# Patient Record
Sex: MALE | Race: WHITE | HISPANIC OR LATINO | URBAN - METROPOLITAN AREA
[De-identification: names, ages, dates, MRNs, and addresses within clinical notes are randomized per-mention and may not be internally consistent; named-entity substitution may affect disease eponyms.]

---

## 2017-08-26 ENCOUNTER — EMERGENCY (EMERGENCY)
Facility: HOSPITAL | Age: 52
LOS: 0 days | Discharge: HOME | End: 2017-08-26
Attending: EMERGENCY MEDICINE | Admitting: EMERGENCY MEDICINE
Payer: MEDICAID

## 2017-08-26 VITALS
DIASTOLIC BLOOD PRESSURE: 80 MMHG | HEART RATE: 92 BPM | RESPIRATION RATE: 17 BRPM | SYSTOLIC BLOOD PRESSURE: 141 MMHG | TEMPERATURE: 98 F | OXYGEN SATURATION: 97 %

## 2017-08-26 VITALS — WEIGHT: 229.94 LBS | HEIGHT: 65 IN

## 2017-08-26 DIAGNOSIS — M25.562 PAIN IN LEFT KNEE: ICD-10-CM

## 2017-08-26 DIAGNOSIS — M25.561 PAIN IN RIGHT KNEE: ICD-10-CM

## 2017-08-26 DIAGNOSIS — M25.569 PAIN IN UNSPECIFIED KNEE: ICD-10-CM

## 2017-08-26 PROCEDURE — 73564 X-RAY EXAM KNEE 4 OR MORE: CPT | Mod: 26

## 2017-08-26 PROCEDURE — 99283 EMERGENCY DEPT VISIT LOW MDM: CPT

## 2017-08-26 RX ORDER — IBUPROFEN 200 MG
600 TABLET ORAL ONCE
Qty: 0 | Refills: 0 | Status: COMPLETED | OUTPATIENT
Start: 2017-08-26 | End: 2017-08-26

## 2017-08-26 RX ADMIN — Medication 600 MILLIGRAM(S): at 18:58

## 2017-08-26 NOTE — ED STATDOCS - MUSCULOSKELETAL, MLM
range of motion is not limited and there is no muscle tenderness. No tenderness B/L knee with palpitation however reports pain

## 2017-08-26 NOTE — ED STATDOCS - ATTENDING CONTRIBUTION TO CARE
I, Reilly Faulkner DO,  performed the initial face to face bedside interview with this patient regarding history of present illness, review of symptoms and relevant past medical, social and family history.  I completed an independent physical examination.  I was the initial provider who evaluated this patient. I have signed out the follow up of any pending tests (i.e. labs, radiological studies) to the ACP.  I have communicated the patient’s plan of care and disposition with the ACP.

## 2017-08-26 NOTE — ED STATDOCS - NS_ ATTENDINGSCRIBEDETAILS _ED_A_ED_FT
Reilly Faulkner DO (Attending): The history, relevant review of systems, past medical and surgical history, medical decision making, and physical examination was documented by the scribe in my presence and I attest to the accuracy of the documentation.

## 2017-08-26 NOTE — ED STATDOCS - PROGRESS NOTE DETAILS
signed Shagufta Rivera PA-C Pt seen in intake initially by Dr Faulkner.    ID [id number]. Pt here for bilateral knee pain for years, worsened over the past few weeks. Pt wanted to make sure he didn't have any broken bones. no effusion or erythema. extensor mechanism intact. no ACL/PCL/LCL/MCL laxity or pain with stress. 2+ DP pulse, gross motor/sensation intact, bilaterally. Likely arthritis, No significant findings on xray. Pt feeling well, agrees with DC and plan of care.

## 2017-08-26 NOTE — ED STATDOCS - OBJECTIVE STATEMENT
52 y/o male presents to the ED for knee pain. Pt has had  knee pain for 3 weeks but for the past 3 days knee pain has worsened. Denies fever, chills, nausea. NKDA. 50 y/o male presents to the ED for knee pain. Pt has had  knee pain for 3 weeks but for the past 3 days knee pain has worsened. Denies fever, chills, nausea. NKDA.  No recent history of trauma.  No other complaints at this time.

## 2017-08-26 NOTE — ED STATDOCS - MEDICAL DECISION MAKING DETAILS
signed Shagufta Rivera PA-C   arthritis, NSAID, outpt f/u ortho 51y M w/ bilateral knee pain, no trauma, ambulatory without assistance.  Will perform screening XR, offer analgesics.  If significant abnormality in XR, will have ortho assessment, if not plan for DC home w/ outpatient f/u.

## 2018-01-12 ENCOUNTER — APPOINTMENT (OUTPATIENT)
Dept: RADIOLOGY | Facility: CLINIC | Age: 53
End: 2018-01-12
Payer: COMMERCIAL

## 2018-01-12 ENCOUNTER — OUTPATIENT (OUTPATIENT)
Dept: OUTPATIENT SERVICES | Facility: HOSPITAL | Age: 53
LOS: 1 days | End: 2018-01-12
Payer: MEDICAID

## 2018-01-12 DIAGNOSIS — Z00.8 ENCOUNTER FOR OTHER GENERAL EXAMINATION: ICD-10-CM

## 2018-01-12 PROCEDURE — 71046 X-RAY EXAM CHEST 2 VIEWS: CPT | Mod: 26

## 2018-01-12 PROCEDURE — 71046 X-RAY EXAM CHEST 2 VIEWS: CPT

## 2018-10-26 ENCOUNTER — EMERGENCY (EMERGENCY)
Facility: HOSPITAL | Age: 53
LOS: 0 days | Discharge: HOME | End: 2018-10-26
Attending: EMERGENCY MEDICINE | Admitting: EMERGENCY MEDICINE
Payer: OTHER MISCELLANEOUS

## 2018-10-26 VITALS — WEIGHT: 229.94 LBS | HEIGHT: 66 IN

## 2018-10-26 VITALS
OXYGEN SATURATION: 100 % | HEART RATE: 81 BPM | RESPIRATION RATE: 18 BRPM | SYSTOLIC BLOOD PRESSURE: 149 MMHG | TEMPERATURE: 98 F | DIASTOLIC BLOOD PRESSURE: 96 MMHG
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
## 2018-10-26 DIAGNOSIS — S63.283A DISLOCATION OF PROXIMAL INTERPHALANGEAL JOINT OF LEFT MIDDLE FINGER, INITIAL ENCOUNTER: ICD-10-CM

## 2018-10-26 DIAGNOSIS — S69.82XA OTHER SPECIFIED INJURIES OF LEFT WRIST, HAND AND FINGER(S), INITIAL ENCOUNTER: ICD-10-CM
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
## 2018-10-26 PROCEDURE — 73140 X-RAY EXAM OF FINGER(S): CPT | Mod: 26,59,LT

## 2018-10-26 PROCEDURE — 99284 EMERGENCY DEPT VISIT MOD MDM: CPT

## 2018-10-26 PROCEDURE — 73090 X-RAY EXAM OF FOREARM: CPT | Mod: 26,LT

## 2018-10-26 PROCEDURE — 73110 X-RAY EXAM OF WRIST: CPT | Mod: 26,LT

## 2018-10-26 PROCEDURE — 73120 X-RAY EXAM OF HAND: CPT | Mod: 26,LT

## 2018-10-26 PROCEDURE — 73030 X-RAY EXAM OF SHOULDER: CPT | Mod: 26,LT

## 2018-10-26 RX ORDER — ACETAMINOPHEN 500 MG
975 TABLET ORAL ONCE
Qty: 0 | Refills: 0 | Status: COMPLETED | OUTPATIENT
Start: 2018-10-26 | End: 2018-10-26

## 2018-10-26 RX ADMIN — Medication 975 MILLIGRAM(S): at 14:52

## 2018-10-26 NOTE — ED ADULT TRIAGE NOTE - CHIEF COMPLAINT QUOTE
pt had his left hand caught on the handle of a large industrial mower , middle finger of left hand deformity.  used ID#[id number]

## 2018-10-26 NOTE — ED STATDOCS - PROGRESS NOTE DETAILS
signed Shagufta Rivera PA-C Pt seen initially in intake by Dr French.  ID [id number]  53M c/o left hand, wrist and forearm pain after it got caught in a rotating landscaping machine at work [employer]. Left hand dominant. Pt c/o pain in left hand and fingers, wrist, and forearm. +deformity of left 3rd and 4th digits. Denies PMH. Plan xray, likely hand consult. Pt agrees with plan of  care. signed Shagufta Rivera PA-C Pt seen initially in intake by Dr French.  +deformity and apparent ligamentous injuyr of left 3rd PIP. SPoke to Dr Bernard, will see pt in ED in consult. Pt now also c/o left shoulder pain and is requesting xray. signed Shagufta Rivera PA-C   ortho PA states pt is s/p reduction, post reduction films adequate, pt splinted. No significant findings on remaining xrays, shoulder shows calcific tendonitis. outpt f/u serge next week. return precautions given. Pt feeling well, pt and family agree with DC and plan of care. signed Shagufta Rivera PA-C   ortho PA states pt is s/p reduction, post reduction films adequate, pt splinted. No significant findings on remaining xrays, shoulder shows calcific tendonitis. outpt f/u serge next week. return precautions given. Pt feeling well, pt and family agree with [facility] and plan of care. Pt declines  services at [facility].

## 2018-10-26 NOTE — ED STATDOCS - PHYSICAL EXAMINATION
+TTP and deformity of left 2-4 digits.  Skin normal tone for race.  No laceration or skin penetration.  Sensation and motor function in the distal extremity intact.  2+ radial pulses. 2+ distal capillary refill.  No pallor, poikilothermia, paresthesia, paralysis, pulselessness, or cyanosis.  No pain out of proportion to exam.  Extremity compartments soft without any sign of compartment syndrome.  No crepitus, gas, or induration.   +TTP of 2-4th digits with decreased ROM to pain.

## 2018-10-26 NOTE — ED STATDOCS - MEDICAL DECISION MAKING DETAILS
54 y/o male presents to the ED c/o left 2rd, 3th and 4th finger injury and pain with visual deformity. Plan for XR and pain control. Likely hand consult. 54 y/o male presents to the ED c/o left 2rd, 3th and 4th finger injury and pain with visual deformity. Plan for XR and pain control. Hand consultation, dislocation reduction.  Symptomatic treatment and reassessment.

## 2018-10-26 NOTE — ED STATDOCS - NS_ ATTENDINGSCRIBEDETAILS _ED_A_ED_FT
The scribe's documentation has been prepared under my direction and personally reviewed by me in its entirety.  I confirm that the note above accurately reflects all my work, treatment, procedures, and decision making except where otherwise noted or amended by me.  Aditya French M.D.

## 2018-10-26 NOTE — ED ADULT NURSE NOTE - NSIMPLEMENTINTERV_GEN_ALL_ED
Implemented All Universal Safety Interventions:  [place] to call system. Call bell, personal items and telephone within reach. Instruct patient to call for assistance. Room bathroom lighting operational. Non-slip footwear when patient is off stretcher. Physically safe environment: no spills, clutter or unnecessary equipment. Stretcher in lowest position, wheels locked, appropriate side rails in place.

## 2018-10-26 NOTE — ED ADULT NURSE NOTE - CHIEF COMPLAINT QUOTE
pt had his left hand caught on the handle of a large industrial mower , middle finger of left hand deformity.  used ID#[id number]

## 2018-10-26 NOTE — ED STATDOCS - OBJECTIVE STATEMENT
54 y/o male presents to the ED c/o left 2rd, 3th and 4th finger injury and pain with visual deformity. Pt was at work and the lawnmower he was working with started spinning in a [place] with his hand still attached to the machine. Pt states that his hand and wrist are also in pain. No other sx at this time. NKDA. [facility]  used, ID#:[id number]. PCP- [facility].

## 2018-10-26 NOTE — CONSULT NOTE ADULT - ASSESSMENT
A/P: 53 LHD M with L 3rd PIP dislocation s/p closed reduction A/P: 53 LHD M with L 3rd PIP dislocation s/p closed reduction.    -closed reduction performed as noted. Tolerated well.   -stable joint s/p reduction with full passive ROM noted; no mechanical block noted.   -alumifoam splint applied dorsally with coban; advised to keep on until follow up next week with Dr. Bernard.  -Keep splint C/D/I.  -Pain control prn  -encourage motion at other digits.   -DW Dr. Bernard who agrees with above.  -FU with Dr. Bernard within the next 5-7 days as an outpatient.

## 2018-10-26 NOTE — ED STATDOCS - ATTENDING CONTRIBUTION TO CARE
KRYSTIN French MD,  performed the initial face to face bedside interview with this patient regarding history of present illness, review of symptoms and relevant past medical, social and family history.  I completed an independent physical examination.  I was the initial provider who evaluated this patient. I have signed out the follow up of any pending tests (i.e. labs, radiological studies) to the ACP.  I have communicated the patient’s plan of care and disposition with the ACP.

## 2018-10-26 NOTE — ED STATDOCS - MUSCULOSKELETAL, MLM
range of motion is not limited. +TTP to left 2rd, 3th and 4th finger and MCP joint. Obvious dislocation of left 3rd finger.

## 2018-10-26 NOTE — CONSULT NOTE ADULT - SUBJECTIVE AND OBJECTIVE BOX
HPI: 53 RHD M with L 3rd digit PIP dislocation that occured earlier today after being knocked to the ground by a large machine at work. Fall onto his L hand and had immediate finger deformity. Presented to the ED with his daughter given the 3rd digit deformity. His primary language is Swedish. His daughter is present to help translate. States he having pain at the deformity. No paresthesias. No head trauma or LOC. No other major injuries. Reports some vague lateral shoulder pain but moving his shoulder well. No other complaints at this time.     PAST MEDICAL & SURGICAL HISTORY:  No pertinent past medical history  No significant past surgical history    Home Medications:  None.    Allergies    No Known Allergies    Intolerances      Vital Signs Last 24 Hrs  T(C): 36.7 (26 Oct 2018 13:28), Max: 36.7 (26 Oct 2018 13:28)  T(F): 98 (26 Oct 2018 13:28), Max: 98 (26 Oct 2018 13:28)  HR: 81 (26 Oct 2018 13:28) (81 - 81)  BP: 149/96 (26 Oct 2018 13:28) (149/96 - 149/96)  BP(mean): --  RR: 18 (26 Oct 2018 13:28) (18 - 18)  SpO2: 100% (26 Oct 2018 13:28) (100% - 100%)    PE:   LUE/hand:   3rd digit deformity. Skin intact.   TTP about the PIP with visible deformity.   No other regions of pain about the 3rd digit.   No other TTP about the hand/wrist.   Finger is well perfused. Brisk capillary refill.   Sensation intact to light touch.     LUE/shoulder:  No TTP . Full painless active ROM.    Secondary Survey: No TTP over bony prominences, SILT, palpable pulses, full/painless range of motion, compartments soft    Xray: HPI: 53 RHD M with L 3rd digit injury that occured earlier today after being knocked to the ground by a large machine at work. Fall onto his L hand and had immediate finger deformity. Presented to the ED with his daughter given the 3rd digit deformity. His primary language is [place]otho. His daughter is present to help translate. States he having pain at the deformity. No paresthesias. No head trauma or LOC. No other major injuries. Reports some vague lateral shoulder pain but moving his shoulder well. No other complaints at this time.     PAST MEDICAL & SURGICAL HISTORY:  No pertinent past medical history  No significant past surgical history    Home Medications:  None.    Allergies    No Known Allergies    Intolerances      Vital Signs Last 24 Hrs  T(C): 36.7 (26 Oct 2018 13:28), Max: 36.7 (26 Oct 2018 13:28)  T(F): 98 (26 Oct 2018 13:28), Max: 98 (26 Oct 2018 13:28)  HR: 81 (26 Oct 2018 13:28) (81 - 81)  BP: 149/96 (26 Oct 2018 13:28) (149/96 - 149/96)  BP(mean): --  RR: 18 (26 Oct 2018 13:28) (18 - 18)  SpO2: 100% (26 Oct 2018 13:28) (100% - 100%)    PE:   LUE/hand:   3rd digit deformity. Skin intact.   TTP about the PIP with visible deformity.   No other regions of pain about the 3rd digit.   No other TTP about the hand/wrist.   Finger is well perfused. Brisk capillary refill.   Sensation intact to light touch.     LUE/shoulder:  No TTP . Full painless active ROM.    Secondary Survey: No TTP over bony prominences, SILT, palpable pulses, full/painless range of motion, compartments soft    Xray: L 3rd digit PIP dislocation without obvious fracture. No other bony abnormalities.  Xrays of L shoulder/wrist: unremarkable.

## 2019-10-28 ENCOUNTER — APPOINTMENT (OUTPATIENT)
Dept: ORTHOPEDIC SURGERY | Facility: CLINIC | Age: 54
End: 2019-10-28

## 2019-11-18 ENCOUNTER — APPOINTMENT (OUTPATIENT)
Dept: ORTHOPEDIC SURGERY | Facility: CLINIC | Age: 54
End: 2019-11-18

## 2020-03-30 ENCOUNTER — INPATIENT (INPATIENT)
Facility: HOSPITAL | Age: 55
LOS: 8 days | Discharge: HOME | DRG: 137 | End: 2020-04-08
Attending: FAMILY MEDICINE | Admitting: FAMILY MEDICINE
Payer: COMMERCIAL

## 2020-03-30 VITALS
SYSTOLIC BLOOD PRESSURE: 125 MMHG | RESPIRATION RATE: 18 BRPM | DIASTOLIC BLOOD PRESSURE: 84 MMHG | HEART RATE: 108 BPM | TEMPERATURE: 99 F | OXYGEN SATURATION: 91 %

## 2020-03-30 DIAGNOSIS — J18.9 PNEUMONIA, UNSPECIFIED ORGANISM: ICD-10-CM

## 2020-03-30 LAB
ALBUMIN SERPL ELPH-MCNC: 3.2 G/DL — LOW (ref 3.3–5)
ALP SERPL-CCNC: 98 U/L — SIGNIFICANT CHANGE UP (ref 40–120)
ALT FLD-CCNC: 149 U/L — HIGH (ref 12–78)
ANION GAP SERPL CALC-SCNC: 8 MMOL/L — SIGNIFICANT CHANGE UP (ref 5–17)
APPEARANCE UR: CLEAR — SIGNIFICANT CHANGE UP
APTT BLD: 39 SEC — HIGH (ref 27.5–36.3)
AST SERPL-CCNC: 56 U/L — HIGH (ref 15–37)
BASOPHILS # BLD AUTO: 0.02 K/UL — SIGNIFICANT CHANGE UP (ref 0–0.2)
BASOPHILS NFR BLD AUTO: 0.1 % — SIGNIFICANT CHANGE UP (ref 0–2)
BILIRUB SERPL-MCNC: 0.6 MG/DL — SIGNIFICANT CHANGE UP (ref 0.2–1.2)
BILIRUB UR-MCNC: NEGATIVE — SIGNIFICANT CHANGE UP
BUN SERPL-MCNC: 16 MG/DL — SIGNIFICANT CHANGE UP (ref 7–23)
CALCIUM SERPL-MCNC: 9.3 MG/DL — SIGNIFICANT CHANGE UP (ref 8.5–10.1)
CHLORIDE SERPL-SCNC: 104 MMOL/L — SIGNIFICANT CHANGE UP (ref 96–108)
CK SERPL-CCNC: 90 U/L — SIGNIFICANT CHANGE UP (ref 26–308)
CO2 SERPL-SCNC: 24 MMOL/L — SIGNIFICANT CHANGE UP (ref 22–31)
COLOR SPEC: YELLOW — SIGNIFICANT CHANGE UP
CREAT SERPL-MCNC: 0.9 MG/DL — SIGNIFICANT CHANGE UP (ref 0.5–1.3)
D DIMER BLD IA.RAPID-MCNC: 169 NG/ML DDU — SIGNIFICANT CHANGE UP
DIFF PNL FLD: NEGATIVE — SIGNIFICANT CHANGE UP
EOSINOPHIL # BLD AUTO: 0 K/UL — SIGNIFICANT CHANGE UP (ref 0–0.5)
EOSINOPHIL NFR BLD AUTO: 0 % — SIGNIFICANT CHANGE UP (ref 0–6)
GLUCOSE SERPL-MCNC: 91 MG/DL — SIGNIFICANT CHANGE UP (ref 70–99)
GLUCOSE UR QL: NEGATIVE MG/DL — SIGNIFICANT CHANGE UP
HCT VFR BLD CALC: 47.8 % — SIGNIFICANT CHANGE UP (ref 39–50)
HGB BLD-MCNC: 15.9 G/DL — SIGNIFICANT CHANGE UP (ref 13–17)
IMM GRANULOCYTES NFR BLD AUTO: 1.4 % — SIGNIFICANT CHANGE UP (ref 0–1.5)
INR BLD: 1.12 RATIO — SIGNIFICANT CHANGE UP (ref 0.88–1.16)
KETONES UR-MCNC: NEGATIVE — SIGNIFICANT CHANGE UP
LACTATE SERPL-SCNC: 1.2 MMOL/L — SIGNIFICANT CHANGE UP (ref 0.7–2)
LDH SERPL L TO P-CCNC: 366 U/L — HIGH (ref 84–241)
LEUKOCYTE ESTERASE UR-ACNC: NEGATIVE — SIGNIFICANT CHANGE UP
LYMPHOCYTES # BLD AUTO: 0.57 K/UL — LOW (ref 1–3.3)
LYMPHOCYTES # BLD AUTO: 2.6 % — LOW (ref 13–44)
MAGNESIUM SERPL-MCNC: 2.1 MG/DL — SIGNIFICANT CHANGE UP (ref 1.6–2.6)
MCHC RBC-ENTMCNC: 28.9 PG — SIGNIFICANT CHANGE UP (ref 27–34)
MCHC RBC-ENTMCNC: 33.3 GM/DL — SIGNIFICANT CHANGE UP (ref 32–36)
MCV RBC AUTO: 86.9 FL — SIGNIFICANT CHANGE UP (ref 80–100)
MONOCYTES # BLD AUTO: 0.9 K/UL — SIGNIFICANT CHANGE UP (ref 0–0.9)
MONOCYTES NFR BLD AUTO: 4.2 % — SIGNIFICANT CHANGE UP (ref 2–14)
NEUTROPHILS # BLD AUTO: 19.76 K/UL — HIGH (ref 1.8–7.4)
NEUTROPHILS NFR BLD AUTO: 91.7 % — HIGH (ref 43–77)
NITRITE UR-MCNC: NEGATIVE — SIGNIFICANT CHANGE UP
PH UR: 7 — SIGNIFICANT CHANGE UP (ref 5–8)
PLATELET # BLD AUTO: 298 K/UL — SIGNIFICANT CHANGE UP (ref 150–400)
POTASSIUM SERPL-MCNC: 3.9 MMOL/L — SIGNIFICANT CHANGE UP (ref 3.5–5.3)
POTASSIUM SERPL-SCNC: 3.9 MMOL/L — SIGNIFICANT CHANGE UP (ref 3.5–5.3)
PROT SERPL-MCNC: 7.6 GM/DL — SIGNIFICANT CHANGE UP (ref 6–8.3)
PROT UR-MCNC: NEGATIVE MG/DL — SIGNIFICANT CHANGE UP
PROTHROM AB SERPL-ACNC: 12.5 SEC — SIGNIFICANT CHANGE UP (ref 10–12.9)
RAPID RVP RESULT: SIGNIFICANT CHANGE UP
RBC # BLD: 5.5 M/UL — SIGNIFICANT CHANGE UP (ref 4.2–5.8)
RBC # FLD: 15.2 % — HIGH (ref 10.3–14.5)
SODIUM SERPL-SCNC: 136 MMOL/L — SIGNIFICANT CHANGE UP (ref 135–145)
SP GR SPEC: 1 — LOW (ref 1.01–1.02)
UROBILINOGEN FLD QL: NEGATIVE MG/DL — SIGNIFICANT CHANGE UP
WBC # BLD: 21.55 K/UL — HIGH (ref 3.8–10.5)
WBC # FLD AUTO: 21.55 K/UL — HIGH (ref 3.8–10.5)

## 2020-03-30 PROCEDURE — 93010 ELECTROCARDIOGRAM REPORT: CPT

## 2020-03-30 PROCEDURE — 81003 URINALYSIS AUTO W/O SCOPE: CPT

## 2020-03-30 PROCEDURE — 85027 COMPLETE CBC AUTOMATED: CPT

## 2020-03-30 PROCEDURE — 80076 HEPATIC FUNCTION PANEL: CPT

## 2020-03-30 PROCEDURE — 84145 PROCALCITONIN (PCT): CPT

## 2020-03-30 PROCEDURE — 83036 HEMOGLOBIN GLYCOSYLATED A1C: CPT

## 2020-03-30 PROCEDURE — 87086 URINE CULTURE/COLONY COUNT: CPT

## 2020-03-30 PROCEDURE — 83735 ASSAY OF MAGNESIUM: CPT

## 2020-03-30 PROCEDURE — 93005 ELECTROCARDIOGRAM TRACING: CPT

## 2020-03-30 PROCEDURE — 83615 LACTATE (LD) (LDH) ENZYME: CPT

## 2020-03-30 PROCEDURE — 86803 HEPATITIS C AB TEST: CPT

## 2020-03-30 PROCEDURE — 85025 COMPLETE CBC W/AUTO DIFF WBC: CPT

## 2020-03-30 PROCEDURE — 71045 X-RAY EXAM CHEST 1 VIEW: CPT

## 2020-03-30 PROCEDURE — 84484 ASSAY OF TROPONIN QUANT: CPT

## 2020-03-30 PROCEDURE — 94640 AIRWAY INHALATION TREATMENT: CPT

## 2020-03-30 PROCEDURE — 99223 1ST HOSP IP/OBS HIGH 75: CPT

## 2020-03-30 PROCEDURE — 82728 ASSAY OF FERRITIN: CPT

## 2020-03-30 PROCEDURE — 36415 COLL VENOUS BLD VENIPUNCTURE: CPT

## 2020-03-30 PROCEDURE — 82550 ASSAY OF CK (CPK): CPT

## 2020-03-30 PROCEDURE — 80074 ACUTE HEPATITIS PANEL: CPT

## 2020-03-30 PROCEDURE — 71045 X-RAY EXAM CHEST 1 VIEW: CPT | Mod: 26

## 2020-03-30 PROCEDURE — 80053 COMPREHEN METABOLIC PANEL: CPT

## 2020-03-30 PROCEDURE — 86140 C-REACTIVE PROTEIN: CPT

## 2020-03-30 PROCEDURE — 85379 FIBRIN DEGRADATION QUANT: CPT

## 2020-03-30 RX ORDER — HYDROXYCHLOROQUINE SULFATE 200 MG
200 TABLET ORAL
Refills: 0 | Status: COMPLETED | OUTPATIENT
Start: 2020-03-31 | End: 2020-04-04

## 2020-03-30 RX ORDER — HYDROXYCHLOROQUINE SULFATE 200 MG
TABLET ORAL
Refills: 0 | Status: DISCONTINUED | OUTPATIENT
Start: 2020-03-30 | End: 2020-03-30

## 2020-03-30 RX ORDER — SODIUM CHLORIDE 9 MG/ML
1000 INJECTION INTRAMUSCULAR; INTRAVENOUS; SUBCUTANEOUS ONCE
Refills: 0 | Status: COMPLETED | OUTPATIENT
Start: 2020-03-30 | End: 2020-03-30

## 2020-03-30 RX ORDER — CEFTRIAXONE 500 MG/1
1000 INJECTION, POWDER, FOR SOLUTION INTRAMUSCULAR; INTRAVENOUS EVERY 24 HOURS
Refills: 0 | Status: COMPLETED | OUTPATIENT
Start: 2020-03-30 | End: 2020-04-04

## 2020-03-30 RX ORDER — FAMOTIDINE 10 MG/ML
40 INJECTION INTRAVENOUS ONCE
Refills: 0 | Status: COMPLETED | OUTPATIENT
Start: 2020-03-30 | End: 2020-03-30

## 2020-03-30 RX ORDER — ENOXAPARIN SODIUM 100 MG/ML
40 INJECTION SUBCUTANEOUS DAILY
Refills: 0 | Status: DISCONTINUED | OUTPATIENT
Start: 2020-03-30 | End: 2020-04-08

## 2020-03-30 RX ORDER — AZITHROMYCIN 500 MG/1
500 TABLET, FILM COATED ORAL ONCE
Refills: 0 | Status: DISCONTINUED | OUTPATIENT
Start: 2020-03-30 | End: 2020-03-30

## 2020-03-30 RX ORDER — AZITHROMYCIN 500 MG/1
250 TABLET, FILM COATED ORAL DAILY
Refills: 0 | Status: DISCONTINUED | OUTPATIENT
Start: 2020-03-30 | End: 2020-04-01

## 2020-03-30 RX ORDER — ALBUTEROL 90 UG/1
2 AEROSOL, METERED ORAL EVERY 4 HOURS
Refills: 0 | Status: DISCONTINUED | OUTPATIENT
Start: 2020-03-30 | End: 2020-03-30

## 2020-03-30 RX ORDER — AZITHROMYCIN 500 MG/1
500 TABLET, FILM COATED ORAL ONCE
Refills: 0 | Status: COMPLETED | OUTPATIENT
Start: 2020-03-30 | End: 2020-03-30

## 2020-03-30 RX ORDER — AMLODIPINE BESYLATE 2.5 MG/1
10 TABLET ORAL DAILY
Refills: 0 | Status: DISCONTINUED | OUTPATIENT
Start: 2020-03-30 | End: 2020-04-08

## 2020-03-30 RX ORDER — SIMVASTATIN 20 MG/1
10 TABLET, FILM COATED ORAL AT BEDTIME
Refills: 0 | Status: DISCONTINUED | OUTPATIENT
Start: 2020-03-30 | End: 2020-04-08

## 2020-03-30 RX ORDER — ONDANSETRON 8 MG/1
4 TABLET, FILM COATED ORAL EVERY 6 HOURS
Refills: 0 | Status: DISCONTINUED | OUTPATIENT
Start: 2020-03-30 | End: 2020-04-08

## 2020-03-30 RX ORDER — ACETAMINOPHEN 500 MG
1000 TABLET ORAL EVERY 6 HOURS
Refills: 0 | Status: DISCONTINUED | OUTPATIENT
Start: 2020-03-30 | End: 2020-04-08

## 2020-03-30 RX ORDER — ALBUTEROL 90 UG/1
2 AEROSOL, METERED ORAL EVERY 4 HOURS
Refills: 0 | Status: DISCONTINUED | OUTPATIENT
Start: 2020-03-30 | End: 2020-04-08

## 2020-03-30 RX ORDER — CEFTRIAXONE 500 MG/1
1000 INJECTION, POWDER, FOR SOLUTION INTRAMUSCULAR; INTRAVENOUS EVERY 24 HOURS
Refills: 0 | Status: DISCONTINUED | OUTPATIENT
Start: 2020-03-30 | End: 2020-03-30

## 2020-03-30 RX ORDER — HYDROXYCHLOROQUINE SULFATE 200 MG
400 TABLET ORAL ONCE
Refills: 0 | Status: COMPLETED | OUTPATIENT
Start: 2020-03-30 | End: 2020-03-30

## 2020-03-30 RX ORDER — ALBUTEROL 90 UG/1
4 AEROSOL, METERED ORAL ONCE
Refills: 0 | Status: COMPLETED | OUTPATIENT
Start: 2020-03-30 | End: 2020-03-30

## 2020-03-30 RX ORDER — CEFTRIAXONE 500 MG/1
1000 INJECTION, POWDER, FOR SOLUTION INTRAMUSCULAR; INTRAVENOUS ONCE
Refills: 0 | Status: COMPLETED | OUTPATIENT
Start: 2020-03-30 | End: 2020-03-30

## 2020-03-30 RX ORDER — HYDROXYCHLOROQUINE SULFATE 200 MG
400 TABLET ORAL
Refills: 0 | Status: DISCONTINUED | OUTPATIENT
Start: 2020-03-30 | End: 2020-03-30

## 2020-03-30 RX ORDER — HYDROXYCHLOROQUINE SULFATE 200 MG
400 TABLET ORAL
Refills: 0 | Status: COMPLETED | OUTPATIENT
Start: 2020-03-31 | End: 2020-03-31

## 2020-03-30 RX ADMIN — AZITHROMYCIN 500 MILLIGRAM(S): 500 TABLET, FILM COATED ORAL at 15:36

## 2020-03-30 RX ADMIN — SODIUM CHLORIDE 1000 MILLILITER(S): 9 INJECTION INTRAMUSCULAR; INTRAVENOUS; SUBCUTANEOUS at 15:35

## 2020-03-30 RX ADMIN — Medication 30 MILLILITER(S): at 14:00

## 2020-03-30 RX ADMIN — CEFTRIAXONE 1000 MILLIGRAM(S): 500 INJECTION, POWDER, FOR SOLUTION INTRAMUSCULAR; INTRAVENOUS at 15:36

## 2020-03-30 RX ADMIN — ALBUTEROL 4 PUFF(S): 90 AEROSOL, METERED ORAL at 14:00

## 2020-03-30 RX ADMIN — FAMOTIDINE 40 MILLIGRAM(S): 10 INJECTION INTRAVENOUS at 14:00

## 2020-03-30 NOTE — H&P ADULT - NSICDXPASTMEDICALHX_GEN_ALL_CORE_FT
PAST MEDICAL HISTORY:  Asthma     High cholesterol     HTN (hypertension)     No pertinent past medical history

## 2020-03-30 NOTE — ED PROVIDER NOTE - CONSTITUTIONAL, MLM
normal... Well appearing, awake, alert, oriented to person, place, time/situation and in moderate distress due to SOB

## 2020-03-30 NOTE — ED PROVIDER NOTE - PHYSICAL EXAMINATION
Constitutional: NAD AAOx3  Eyes: PERRLA EOMI  Head: NCAT  Mouth: MMM  Cardiac: s1s2. rrr  Lungs: CTA B/L. No accessory muscle use  Abd: soft, nd. Mild epigastric ttp. no r/g/r  Neuro: CN2-12 intact  Skin: No rashes Constitutional: NAD AAOx3. Speaking full sentences   Eyes: PERRLA EOMI  Head: NCAT  Mouth: MMM  Cardiac: s1s2. rrr  Lungs: CTA B/L. No accessory muscle use  Abd: soft, nd. Mild epigastric ttp. no r/g/r  Neuro: CN2-12 intact  Skin: No rashes

## 2020-03-30 NOTE — ED PROVIDER NOTE - OBJECTIVE STATEMENT
[facility]  [id number]. 53 y/o M presents with SOB since saturday. Pt reports dry cough for the past 5 weeks. On Saturday he started feeling increased SOB and developed epigastric pain mild in intensity. Pt has a hx of ashtma however is nebulizer broke at home. Subjective fever at home. Denies chills, n/v/d, CP, HA, dizziness or other complaints at this time. -Dale Dorado PA-C

## 2020-03-30 NOTE — H&P ADULT - HISTORY OF PRESENT ILLNESS
54y old Male with PMHx of HTN, HLD, and Asthma who presents with a chief complaint of shortness of breath, pleuritic chest pain, and subjective fevers for the last 3 days.  Pt notes he started having increased wheezing and SOB on Saturday.  He tried using his inhaler, but it was broken.  He felt hot but did not check his temperature.  He describes electric shocks in his chest when taking deep breaths.  He denies nausea/ vomiting/ diarrhea/ abd pain.  Someone he works with was sick but he is not sure what he had.  No other sick contacts.  In the ER, patient found to have hypoxia-- 92% on 4L now 95% on NRB mask.  WBC 21 with 2% lymph, elevated AST/ ALT.  QTc 414.  CXR with bilateral infiltrates.  Pt admitted as r/o COVID.      PAST MEDICAL & SURGICAL HISTORY:  Asthma  High cholesterol  HTN (hypertension)    FAMILY HISTORY:  Family hx of HTN.      Social History:    No tob/ ETOH / drug use.    Lives at home with wife.      Allergies:  No Known Allergies

## 2020-03-30 NOTE — H&P ADULT - NSHPREVIEWOFSYSTEMS_GEN_ALL_CORE
ROS:  General:  fever  Skin: No rash or bothersome skin lesions  Musculoskeletal: No arthalgias, myalgias or joint swelling  Eyes: No visual changes or eye pain  Ears: No hearing loss , otorrhea or ear pain  Nose, Mouth, Throat: No nasal congestion, rhinorrhea, oral lesions, postnasal drip or sore throat  Cardio: pleuritic CP  Respiratory: cough and SOB  GI: No diarrhea, constipation, blood in stools, abdominal pain, vomiting or heartburn  : No urinary frequency, hematuria, incontinence, or dysuria  Neurologic: No headaches, parasthesias, confusion, dysarthria or gait instability  Psychiatric:  No anxiety or depression  Lymphatic:  No easy bruising, easy bleeding or swollen glands  Allergic: No itching, sneezing , watery eyes, clear rhinorrhea or recurrent infections

## 2020-03-30 NOTE — H&P ADULT - NSHPPHYSICALEXAM_GEN_ALL_CORE
PEx  T(C): 37.1 (03-30-20 @ 12:37), Max: 37.1 (03-30-20 @ 12:37)  HR: 95 (03-30-20 @ 18:45) (95 - 110)  BP: 157/80 (03-30-20 @ 18:45) (125/84 - 157/80)  RR: 28 (03-30-20 @ 18:45) (18 - 45)  SpO2: 98% (03-30-20 @ 18:45) (91% - 99%)    General:  awake and alert.  Tachypnea with rate ~30.    Skin: no rash or prominent lesions  Head: normocephalic, atraumatic     Sinuses: non-tender  Nose: no external lesions, mucosa non-inflamed, septum and turbinates normal  Throat: no erythema, exudates or lesions.  Neck: Supple without lymphadenopathy. Thyroid no thyromegaly, no palpable thyroid nodules, no palpable nodules or masses, carotid arteries no bruits.   Breasts: No palpable masses or lesions.  Heart: RRR, no murmur or gallop.  Normal S1, S2.  No S3, S4.   Lungs: wheezing and rales  Chest wall: increased WOB  Abdomen:  Soft, NT/ND, normal bowel sounds, no HSM, no masses.  No peritoneal signs.   Back: spine normal without deformity or tenderness.  Normal ROM   : Exam normal.  no inguinal hernias.  Extremities: No deformities, clubbing, cyanosis, or edema.  Musculoskeletal: Normal gait and station. No decreased range of motion, instability, atrophy or abnormal strength or tone in the head, neck, spine, ribs, pelvis or extremities.   Neurologic: CN 2-12 normal. Sensation to pain, touch and proprioception normal. DTRs normal in upper and lower extremities. No pathologic reflexes.  Motor normal.  Psychiatric: Oriented X3, intact recent and remote memory, judgement and insight, normal mood and affect.

## 2020-03-30 NOTE — H&P ADULT - NSHPLABSRESULTS_GEN_ALL_CORE
15.9   21.55 )-----------( 298      ( 30 Mar 2020 14:23 )             47.8         136  |  104  |  16  ----------------------------<  91  3.9   |  24  |  0.90    Ca    9.3      30 Mar 2020 14:23    TPro  7.6  /  Alb  3.2<L>  /  TBili  0.6  /  DBili  x   /  AST  56<H>  /  ALT  149<H>  /  AlkPhos  98      CAPILLARY BLOOD GLUCOSE        PT/INR - ( 30 Mar 2020 14:23 )   PT: 12.5 sec;   INR: 1.12 ratio         PTT - ( 30 Mar 2020 14:23 )  PTT:39.0 sec  Urinalysis Basic - ( 30 Mar 2020 17:05 )    Color: Yellow / Appearance: Clear / S.005 / pH: x  Gluc: x / Ketone: Negative  / Bili: Negative / Urobili: Negative mg/dL   Blood: x / Protein: Negative mg/dL / Nitrite: Negative   Leuk Esterase: Negative / RBC: x / WBC x   Sq Epi: x / Non Sq Epi: x / Bacteria: x    < from: Xray Chest 1 View-PORTABLE IMMEDIATE (20 @ 13:55) >      Comparison: 2018.    Findings:  The heart and mediastinal contours are exaggerated by the AP lordotic technique.. RIGHT base and LEFT perihilar consolidation is observed. No sizable effusions or pneumothorax..        IMPRESSION:    1.  Multifocal pneumonia    < end of copied text >

## 2020-03-30 NOTE — ED PROVIDER NOTE - ATTENDING CONTRIBUTION TO CARE
I, Ihsan Galindo MD, personally saw the patient with ACP.  I have personally performed a face to face diagnostic evaluation on this patient.  I have reviewed the ACP note and agree with the history, exam, and plan of care, except as noted.

## 2020-03-30 NOTE — H&P ADULT - ASSESSMENT
54y old Male with PMHx of HTN, HLD, and Asthma who presents with a chief complaint of shortness of breath, pleuritic chest pain, and subjective fevers for the last 3 days.  Pt notes he started having increased wheezing and SOB on Saturday.  He tried using his inhaler, but it was broken.  He felt hot but did not check his temperature.  He describes electric shocks in his chest when taking deep breaths.  He denies nausea/ vomiting/ diarrhea/ abd pain.  Someone he works with was sick but he is not sure what he had.  No other sick contacts.  In the ER, patient found to have hypoxia-- 92% on 4L now 95% on NRB mask.  WBC 21 with 2% lymph, elevated AST/ ALT.  QTc 414.  CXR with bilateral infiltrates.  Pt admitted as r/o COVID.      #Acute Hypoxic Respiratory Failure secondary to presumed COVID PNA:    Admit to medsurg.    Cont NRB mask for o2 support.    High suspicion of COVID-- f/u testing.    Start IV ABX-- Rocephin/ Azithro.    Start plaquenil COVID protocol.  QTc 414.    Code status d/w patient and family-- FULL CODE.  Would want intubation.    Cont albuterol MDI for wheezing q4 hours.    Check LDH, ferritin, DDimer, CRP, Procalcitonin.    Follow leukocytosis/ lymphopenia.      #HTN:    Hold ACE/ HCTZ.    Cont CCB.      #HLD:    Cont statin.      #Elevated LFTs:    Check hepatitis panel.  Likely related to sepsis/ r/o COVID.    Repeat in am.      #Asthma Exacerbation:    Secondary to presumed COVID.  Albuterol MDI.    Hold on steroids for now.      #DVT proph:  Lovenox.      #Code Status:  FULL CODE.

## 2020-03-30 NOTE — ED PROVIDER NOTE - CLINICAL SUMMARY MEDICAL DECISION MAKING FREE TEXT BOX
pt hypoxic on RA with hx of ashtma, probable covid, will check labs, cxr, start abx and admit. -Dale Dorado PA-C

## 2020-03-30 NOTE — ED ADULT NURSE NOTE - OBJECTIVE STATEMENT
c/o shortness of breath and cough for past 2 days, denies sick contact or travel HX: asthma, HTN, high cholesterol

## 2020-03-30 NOTE — ED PROVIDER NOTE - PROGRESS NOTE DETAILS
Anurag Pereira for attending Dr. Galindo: Pt's PCP, Dr. Martins. Pt has hx of asthma. Pt presents with fever and SOB. Pt hypoxic on RA in 80s. With NC, pt sat 91%. Pt now on nonrebreather. Pt tachypneic and with distant breath sounds. Concern for COVID PNA. Plan: labs, chest XR, likely admission. Agree with PA assessment and plan.  ID#: [id number]. Anurag Pereira for attending Dr. Galindo: Pt's PCP, Dr. Martins. Pt has hx of asthma. Pt presents with fever and SOB. Pt hypoxic on RA in 80s. With NC, pt sat 91%. pt started on  nonrebreather. Pt tachypneic and with distant breath sounds. Concern for COVID PNA. Plan: labs, chest XR, likely admission. Agree with PA assessment and plan.  ID#: [id number].

## 2020-03-31 LAB
ADD ON TEST-SPECIMEN IN LAB: SIGNIFICANT CHANGE UP
ALBUMIN SERPL ELPH-MCNC: 2.6 G/DL — LOW (ref 3.3–5)
ALP SERPL-CCNC: 102 U/L — SIGNIFICANT CHANGE UP (ref 40–120)
ALT FLD-CCNC: 124 U/L — HIGH (ref 12–78)
AST SERPL-CCNC: 41 U/L — HIGH (ref 15–37)
BILIRUB DIRECT SERPL-MCNC: 0.2 MG/DL — SIGNIFICANT CHANGE UP (ref 0–0.2)
BILIRUB INDIRECT FLD-MCNC: 0.4 MG/DL — SIGNIFICANT CHANGE UP (ref 0.2–1)
BILIRUB SERPL-MCNC: 0.6 MG/DL — SIGNIFICANT CHANGE UP (ref 0.2–1.2)
CRP SERPL-MCNC: 10.31 MG/DL — HIGH (ref 0–0.4)
CULTURE RESULTS: NO GROWTH — SIGNIFICANT CHANGE UP
FERRITIN SERPL-MCNC: 830 NG/ML — HIGH (ref 30–400)
HAV IGM SER-ACNC: SIGNIFICANT CHANGE UP
HBV CORE IGM SER-ACNC: SIGNIFICANT CHANGE UP
HBV SURFACE AG SER-ACNC: SIGNIFICANT CHANGE UP
HCT VFR BLD CALC: 46.1 % — SIGNIFICANT CHANGE UP (ref 39–50)
HCV AB S/CO SERPL IA: 0.09 S/CO — SIGNIFICANT CHANGE UP (ref 0–0.99)
HCV AB S/CO SERPL IA: 0.1 S/CO — SIGNIFICANT CHANGE UP (ref 0–0.99)
HCV AB SERPL-IMP: SIGNIFICANT CHANGE UP
HCV AB SERPL-IMP: SIGNIFICANT CHANGE UP
HGB BLD-MCNC: 15.3 G/DL — SIGNIFICANT CHANGE UP (ref 13–17)
MCHC RBC-ENTMCNC: 29.1 PG — SIGNIFICANT CHANGE UP (ref 27–34)
MCHC RBC-ENTMCNC: 33.2 GM/DL — SIGNIFICANT CHANGE UP (ref 32–36)
MCV RBC AUTO: 87.6 FL — SIGNIFICANT CHANGE UP (ref 80–100)
PLATELET # BLD AUTO: 270 K/UL — SIGNIFICANT CHANGE UP (ref 150–400)
PROCALCITONIN SERPL-MCNC: 0.06 NG/ML — SIGNIFICANT CHANGE UP (ref 0.02–0.1)
PROT SERPL-MCNC: 7.1 GM/DL — SIGNIFICANT CHANGE UP (ref 6–8.3)
RBC # BLD: 5.26 M/UL — SIGNIFICANT CHANGE UP (ref 4.2–5.8)
RBC # FLD: 15.3 % — HIGH (ref 10.3–14.5)
SARS-COV-2 RNA SPEC QL NAA+PROBE: DETECTED
SPECIMEN SOURCE: SIGNIFICANT CHANGE UP
WBC # BLD: 17.83 K/UL — HIGH (ref 3.8–10.5)
WBC # FLD AUTO: 17.83 K/UL — HIGH (ref 3.8–10.5)

## 2020-03-31 PROCEDURE — 93010 ELECTROCARDIOGRAM REPORT: CPT

## 2020-03-31 PROCEDURE — 99233 SBSQ HOSP IP/OBS HIGH 50: CPT

## 2020-03-31 RX ORDER — HYDROXYCHLOROQUINE SULFATE 200 MG
400 TABLET ORAL ONCE
Refills: 0 | Status: COMPLETED | OUTPATIENT
Start: 2020-03-31 | End: 2020-03-31

## 2020-03-31 RX ORDER — OXYCODONE HYDROCHLORIDE 5 MG/1
5 TABLET ORAL EVERY 4 HOURS
Refills: 0 | Status: DISCONTINUED | OUTPATIENT
Start: 2020-03-31 | End: 2020-04-06

## 2020-03-31 RX ADMIN — ALBUTEROL 2 PUFF(S): 90 AEROSOL, METERED ORAL at 14:24

## 2020-03-31 RX ADMIN — Medication 1000 MILLIGRAM(S): at 15:46

## 2020-03-31 RX ADMIN — Medication 200 MILLIGRAM(S): at 20:30

## 2020-03-31 RX ADMIN — SIMVASTATIN 10 MILLIGRAM(S): 20 TABLET, FILM COATED ORAL at 20:30

## 2020-03-31 RX ADMIN — ALBUTEROL 2 PUFF(S): 90 AEROSOL, METERED ORAL at 00:03

## 2020-03-31 RX ADMIN — AMLODIPINE BESYLATE 10 MILLIGRAM(S): 2.5 TABLET ORAL at 03:51

## 2020-03-31 RX ADMIN — Medication 60 MILLIGRAM(S): at 11:23

## 2020-03-31 RX ADMIN — Medication 400 MILLIGRAM(S): at 18:40

## 2020-03-31 RX ADMIN — Medication 1000 MILLIGRAM(S): at 03:52

## 2020-03-31 RX ADMIN — AZITHROMYCIN 250 MILLIGRAM(S): 500 TABLET, FILM COATED ORAL at 11:31

## 2020-03-31 RX ADMIN — OXYCODONE HYDROCHLORIDE 5 MILLIGRAM(S): 5 TABLET ORAL at 11:20

## 2020-03-31 RX ADMIN — ENOXAPARIN SODIUM 40 MILLIGRAM(S): 100 INJECTION SUBCUTANEOUS at 11:31

## 2020-03-31 RX ADMIN — Medication 60 MILLIGRAM(S): at 20:29

## 2020-03-31 RX ADMIN — ALBUTEROL 2 PUFF(S): 90 AEROSOL, METERED ORAL at 08:13

## 2020-03-31 RX ADMIN — ALBUTEROL 2 PUFF(S): 90 AEROSOL, METERED ORAL at 03:53

## 2020-03-31 RX ADMIN — ALBUTEROL 2 PUFF(S): 90 AEROSOL, METERED ORAL at 20:50

## 2020-03-31 RX ADMIN — Medication 400 MILLIGRAM(S): at 00:02

## 2020-03-31 RX ADMIN — CEFTRIAXONE 1000 MILLIGRAM(S): 500 INJECTION, POWDER, FOR SOLUTION INTRAMUSCULAR; INTRAVENOUS at 14:24

## 2020-03-31 RX ADMIN — ALBUTEROL 2 PUFF(S): 90 AEROSOL, METERED ORAL at 16:44

## 2020-03-31 NOTE — PROGRESS NOTE ADULT - ASSESSMENT
54y old Male with PMHx of HTN, HLD, and Asthma who presents with a chief complaint of shortness of breath, pleuritic chest pain, and subjective fevers for the last 3 days.  Pt notes he started having increased wheezing and SOB on Saturday.  He tried using his inhaler, but it was broken.  He felt hot but did not check his temperature.  He describes electric shocks in his chest when taking deep breaths.  He denies nausea/ vomiting/ diarrhea/ abd pain.  Someone he works with was sick but he is not sure what he had.  No other sick contacts.  In the ER, patient found to have hypoxia-- 92% on 4L now 95% on NRB mask.  WBC 21 with 2% lymph, elevated AST/ ALT.  QTc 414.  CXR with bilateral infiltrates.  Pt admitted as r/o COVID.      #Acute Hypoxic Respiratory Failure secondary to COVID PNA:      Cont NRB mask for o2 support.    Cont IV ABX-- Rocephin/ Azithro.    Cont plaquenil COVID protocol.  QTc 414.    Start IV steroids Solumedrol 60 q12 hours x 5 days.    Code status d/w patient and family-- FULL CODE.  Would want intubation.    Cont albuterol MDI for wheezing q4 hours.    Inflammatory markers reviewed.       #HTN:    Hold ACE/ HCTZ.    Cont CCB.      #HLD:    Cont statin.      #Elevated LFTs:    Check hepatitis panel.  Likely related to sepsis/ r/o COVID.    Repeat in am.      #Asthma Exacerbation:    Secondary to presumed COVID.  Albuterol MDI.    Steroids.      #DVT proph:  Lovenox.      #Code Status:  FULL CODE.

## 2020-03-31 NOTE — PHARMACOTHERAPY INTERVENTION NOTE - COMMENTS
COVID-19. Entered stop date on 04/03/2020 at 23:59 for 5-day course total, 5-day course (500 mg x1, then 250 mg QD x4 days) preferred as part of the hospital-wide stewardship effort, per ID physicians.

## 2020-03-31 NOTE — PROGRESS NOTE ADULT - SUBJECTIVE AND OBJECTIVE BOX
54y old Male with PMHx of HTN, HLD, and Asthma who presents with a chief complaint of shortness of breath, pleuritic chest pain, and subjective fevers for the last 3 days.  Pt notes he started having increased wheezing and SOB on Saturday.  He tried using his inhaler, but it was broken.  He felt hot but did not check his temperature.  He describes electric shocks in his chest when taking deep breaths.  He denies nausea/ vomiting/ diarrhea/ abd pain.  Someone he works with was sick but he is not sure what he had.  No other sick contacts.  In the ER, patient found to have hypoxia-- 92% on 4L now 95% on NRB mask.  WBC 21 with 2% lymph, elevated AST/ ALT.  QTc 414.  CXR with bilateral infiltrates.  COVID +.      3/31:  Patient seen and examined at bedside earlier today.  Feeling slightly better today.  Less SOB.  Tearful.      Review of system- Rest of the review of system are negative except mentioned in HPI    OBJECTIVE:   T(C): 36.4 (20 @ 15:44), Max: 36.8 (20 @ 00:01)  HR: 88 (20 @ 20:26) (87 - 97)  BP: 146/86 (20 @ 15:44) (127/72 - 146/86)  RR: 20 (20 @ 15:44) (20 - 21)  SpO2: 98% (20 @ 20:26) (96% - 98%)  Wt(kg): --  Daily     Daily Weight in k.6 (31 Mar 2020 09:39)    PHYSICAL EXAM:  GENERAL: NAD  NERVOUS SYSTEM:  Alert & Oriented X3, non- focal exam, Motor Strength 5/5 B/L upper and lower extremities; DTRs 2+ intact and symmetric  HEAD:  Atraumatic, Normocephalic  EYES: EOMI, PERRLA, conjunctiva and sclera clear  HEENT: Moist mucous membranes  NECK: Supple, No JVD  CHEST/LUNG: Clear to auscultation bilaterally; No rales, no rhonchi, no wheezing, or rubs  HEART: Regular rate and rhythm; No murmurs, rubs, or gallops  ABDOMEN: Soft, Nontender, Nondistended; Bowel sounds present  GENITOURINARY- Voiding, no suprapubic tenderness  EXTREMITIES:  2+ Peripheral Pulses, No clubbing, cyanosis, or edema  MUSCULOSKELETAL:- No muscle tenderness, Muscle tone normal, No joint tenderness, no Joint swelling, Joint range of motion-normal  SKIN-no rash, no lesion    LABS:                        15.3   17.83 )-----------( 270      ( 31 Mar 2020 07:16 )             46.1     03-30    136  |  104  |  16  ----------------------------<  91  3.9   |  24  |  0.90    Ca    9.3      30 Mar 2020 14:23  Mg     2.1     03-30    TPro  7.1  /  Alb  2.6<L>  /  TBili  0.6  /  DBili  0.2  /  AST  41<H>  /  ALT  124<H>  /  AlkPhos  102  03-31    PT/INR - ( 30 Mar 2020 14:23 )   PT: 12.5 sec;   INR: 1.12 ratio         PTT - ( 30 Mar 2020 14:23 )  PTT:39.0 sec  CARDIAC MARKERS ( 31 Mar 2020 07:16 )  <0.015 ng/mL / x     / x     / x     / x      CARDIAC MARKERS ( 30 Mar 2020 22:02 )  x     / x     / 90 U/L / x     / x          Urinalysis Basic - ( 30 Mar 2020 17:05 )    Color: Yellow / Appearance: Clear / S.005 / pH: x  Gluc: x / Ketone: Negative  / Bili: Negative / Urobili: Negative mg/dL   Blood: x / Protein: Negative mg/dL / Nitrite: Negative   Leuk Esterase: Negative / RBC: x / WBC x   Sq Epi: x / Non Sq Epi: x / Bacteria: x    Current medications:  acetaminophen   Tablet .. 1000 milliGRAM(s) Oral every 6 hours PRN  ALBUTerol    90 MICROgram(s) HFA Inhaler 2 Puff(s) Inhalation every 4 hours  aluminum hydroxide/magnesium hydroxide/simethicone Suspension 30 milliLiter(s) Oral every 4 hours PRN  amLODIPine   Tablet 10 milliGRAM(s) Oral daily  azithromycin   Tablet 250 milliGRAM(s) Oral daily  cefTRIAXone Injectable. 1000 milliGRAM(s) IV Push every 24 hours  enoxaparin Injectable 40 milliGRAM(s) SubCutaneous daily  hydroxychloroquine 200 milliGRAM(s) Oral two times a day  methylPREDNISolone sodium succinate Injectable 60 milliGRAM(s) IV Push every 12 hours  ondansetron Injectable 4 milliGRAM(s) IV Push every 6 hours PRN  oxyCODONE    IR 5 milliGRAM(s) Oral every 4 hours PRN  simvastatin 10 milliGRAM(s) Oral at bedtime

## 2020-04-01 PROCEDURE — 99232 SBSQ HOSP IP/OBS MODERATE 35: CPT

## 2020-04-01 RX ADMIN — OXYCODONE HYDROCHLORIDE 5 MILLIGRAM(S): 5 TABLET ORAL at 09:11

## 2020-04-01 RX ADMIN — Medication 200 MILLIGRAM(S): at 05:59

## 2020-04-01 RX ADMIN — ENOXAPARIN SODIUM 40 MILLIGRAM(S): 100 INJECTION SUBCUTANEOUS at 09:44

## 2020-04-01 RX ADMIN — Medication 1000 MILLIGRAM(S): at 00:49

## 2020-04-01 RX ADMIN — ALBUTEROL 2 PUFF(S): 90 AEROSOL, METERED ORAL at 12:43

## 2020-04-01 RX ADMIN — SIMVASTATIN 10 MILLIGRAM(S): 20 TABLET, FILM COATED ORAL at 22:09

## 2020-04-01 RX ADMIN — ALBUTEROL 2 PUFF(S): 90 AEROSOL, METERED ORAL at 06:01

## 2020-04-01 RX ADMIN — AMLODIPINE BESYLATE 10 MILLIGRAM(S): 2.5 TABLET ORAL at 05:59

## 2020-04-01 RX ADMIN — Medication 60 MILLIGRAM(S): at 18:39

## 2020-04-01 RX ADMIN — ALBUTEROL 2 PUFF(S): 90 AEROSOL, METERED ORAL at 00:05

## 2020-04-01 RX ADMIN — Medication 200 MILLIGRAM(S): at 18:39

## 2020-04-01 RX ADMIN — CEFTRIAXONE 1000 MILLIGRAM(S): 500 INJECTION, POWDER, FOR SOLUTION INTRAMUSCULAR; INTRAVENOUS at 09:44

## 2020-04-01 RX ADMIN — ALBUTEROL 2 PUFF(S): 90 AEROSOL, METERED ORAL at 20:33

## 2020-04-01 RX ADMIN — Medication 60 MILLIGRAM(S): at 06:00

## 2020-04-01 RX ADMIN — ALBUTEROL 2 PUFF(S): 90 AEROSOL, METERED ORAL at 09:15

## 2020-04-01 RX ADMIN — ALBUTEROL 2 PUFF(S): 90 AEROSOL, METERED ORAL at 16:25

## 2020-04-01 NOTE — PROGRESS NOTE ADULT - ASSESSMENT
54y old Male with PMHx of HTN, HLD, and Asthma who presents with a chief complaint of shortness of breath, pleuritic chest pain, and subjective fevers for the last 3 days.  Pt notes he started having increased wheezing and SOB on Saturday.  He tried using his inhaler, but it was broken.  He felt hot but did not check his temperature.  He describes electric shocks in his chest when taking deep breaths.  He denies nausea/ vomiting/ diarrhea/ abd pain.  Someone he works with was sick but he is not sure what he had.  No other sick contacts.  In the ER, patient found to have hypoxia-- 92% on 4L now 95% on NRB mask.  WBC 21 with 2% lymph, elevated AST/ ALT.  QTc 414.  CXR with bilateral infiltrates.  Pt admitted as r/o COVID.      #Acute Hypoxic Respiratory Failure secondary to COVID PNA:      Oxygen titrated down from NRB to 5L.    Cont IV ABX-- Rocephin/ Azithro day #3.    Cont plaquenil COVID protocol day #3.  QTc 414.    Start IV steroids Solumedrol 60 q12 hours x 5 days.    Code status d/w patient and family-- FULL CODE.  Would want intubation.    Cont albuterol MDI for wheezing q4 hours.    Inflammatory markers elevated.      #HTN:    Hold ACE/ HCTZ.    Cont CCB.      #HLD:    Cont statin.      #Elevated LFTs:    Check hepatitis panel.  Likely related to sepsis/ r/o COVID.    Repeat in am.      #Asthma Exacerbation:    Secondary to presumed COVID.  Albuterol MDI.    Steroids.      #DVT proph:  Lovenox.      #Code Status:  FULL CODE.

## 2020-04-01 NOTE — PROGRESS NOTE ADULT - SUBJECTIVE AND OBJECTIVE BOX
54y old Male with PMHx of HTN, HLD, and Asthma who presents with a chief complaint of shortness of breath, pleuritic chest pain, and subjective fevers for the last 3 days.  Pt notes he started having increased wheezing and SOB on Saturday.  He tried using his inhaler, but it was broken.  He felt hot but did not check his temperature.  He describes electric shocks in his chest when taking deep breaths.  He denies nausea/ vomiting/ diarrhea/ abd pain.  Someone he works with was sick but he is not sure what he had.  No other sick contacts.  In the ER, patient found to have hypoxia-- 92% on 4L now 95% on NRB mask.  WBC 21 with 2% lymph, elevated AST/ ALT.  QTc 414.  CXR with bilateral infiltrates.  COVID +.      3/31:  Patient seen and examined at bedside earlier today.  Feeling slightly better today.  Less SOB.  Tearful.  [place]  phone used to communicate with patient.    4/1:  Pt seen.  Oxygen titrated down.  Now on 5L.  Feeling better.      Review of system- Rest of the review of system are negative except mentioned in HPI    Vital Signs Last 24 Hrs  T(C): 36.3 (01 Apr 2020 10:35), Max: 36.6 (01 Apr 2020 06:00)  T(F): 97.3 (01 Apr 2020 10:35), Max: 97.8 (01 Apr 2020 06:00)  HR: 87 (01 Apr 2020 10:35) (80 - 88)  BP: 136/77 (01 Apr 2020 10:35) (134/82 - 136/77)  BP(mean): --  RR: 18 (01 Apr 2020 12:00) (18 - 20)  SpO2: 92% (01 Apr 2020 12:00) (88% - 98%)    PHYSICAL EXAM:  GENERAL: NAD  NERVOUS SYSTEM:  Alert & Oriented X3, non- focal exam, Motor Strength 5/5 B/L upper and lower extremities; DTRs 2+ intact and symmetric  HEAD:  Atraumatic, Normocephalic  EYES: EOMI, PERRLA, conjunctiva and sclera clear  HEENT: Moist mucous membranes  NECK: Supple, No JVD  CHEST/LUNG: Clear to auscultation bilaterally; No rales, no rhonchi, no wheezing, or rubs  HEART: Regular rate and rhythm; No murmurs, rubs, or gallops  ABDOMEN: Soft, Nontender, Nondistended; Bowel sounds present  GENITOURINARY- Voiding, no suprapubic tenderness  EXTREMITIES:  2+ Peripheral Pulses, No clubbing, cyanosis, or edema  MUSCULOSKELETAL:- No muscle tenderness, Muscle tone normal, No joint tenderness, no Joint swelling, Joint range of motion-normal  SKIN-no rash, no lesion      Mg     2.1     03-30    TPro  7.1  /  Alb  2.6<L>  /  TBili  0.6  /  DBili  0.2  /  AST  41<H>  /  ALT  124<H>  /  AlkPhos  102  03-31                            15.3   17.83 )-----------( 270      ( 31 Mar 2020 07:16 )             46.1       CARDIAC MARKERS ( 31 Mar 2020 07:16 )  <0.015 ng/mL / x     / x     / x     / x      CARDIAC MARKERS ( 30 Mar 2020 22:02 )  x     / x     / 90 U/L / x     / x            LIVER FUNCTIONS - ( 31 Mar 2020 07:16 )  Alb: 2.6 g/dL / Pro: 7.1 gm/dL / ALK PHOS: 102 U/L / ALT: 124 U/L / AST: 41 U/L / GGT: x           MEDICATIONS  (STANDING):  ALBUTerol    90 MICROgram(s) HFA Inhaler 2 Puff(s) Inhalation every 4 hours  amLODIPine   Tablet 10 milliGRAM(s) Oral daily  cefTRIAXone Injectable. 1000 milliGRAM(s) IV Push every 24 hours  enoxaparin Injectable 40 milliGRAM(s) SubCutaneous daily  hydroxychloroquine 200 milliGRAM(s) Oral two times a day  methylPREDNISolone sodium succinate Injectable 60 milliGRAM(s) IV Push every 12 hours  simvastatin 10 milliGRAM(s) Oral at bedtime    MEDICATIONS  (PRN):  acetaminophen   Tablet .. 1000 milliGRAM(s) Oral every 6 hours PRN Mild Pain (1 - 3)  aluminum hydroxide/magnesium hydroxide/simethicone Suspension 30 milliLiter(s) Oral every 4 hours PRN Dyspepsia  ondansetron Injectable 4 milliGRAM(s) IV Push every 6 hours PRN Nausea and/or Vomiting  oxyCODONE    IR 5 milliGRAM(s) Oral every 4 hours PRN Moderate Pain (4 - 6)

## 2020-04-02 LAB
ALBUMIN SERPL ELPH-MCNC: 2.7 G/DL — LOW (ref 3.3–5)
ALP SERPL-CCNC: 98 U/L — SIGNIFICANT CHANGE UP (ref 40–120)
ALT FLD-CCNC: 126 U/L — HIGH (ref 12–78)
ANION GAP SERPL CALC-SCNC: 8 MMOL/L — SIGNIFICANT CHANGE UP (ref 5–17)
AST SERPL-CCNC: 40 U/L — HIGH (ref 15–37)
BILIRUB SERPL-MCNC: 0.6 MG/DL — SIGNIFICANT CHANGE UP (ref 0.2–1.2)
BUN SERPL-MCNC: 22 MG/DL — SIGNIFICANT CHANGE UP (ref 7–23)
CALCIUM SERPL-MCNC: 9.1 MG/DL — SIGNIFICANT CHANGE UP (ref 8.5–10.1)
CHLORIDE SERPL-SCNC: 108 MMOL/L — SIGNIFICANT CHANGE UP (ref 96–108)
CO2 SERPL-SCNC: 24 MMOL/L — SIGNIFICANT CHANGE UP (ref 22–31)
CREAT SERPL-MCNC: 0.68 MG/DL — SIGNIFICANT CHANGE UP (ref 0.5–1.3)
D DIMER BLD IA.RAPID-MCNC: 152 NG/ML DDU — SIGNIFICANT CHANGE UP
GLUCOSE SERPL-MCNC: 122 MG/DL — HIGH (ref 70–99)
POTASSIUM SERPL-MCNC: 3.9 MMOL/L — SIGNIFICANT CHANGE UP (ref 3.5–5.3)
POTASSIUM SERPL-SCNC: 3.9 MMOL/L — SIGNIFICANT CHANGE UP (ref 3.5–5.3)
PROT SERPL-MCNC: 6.8 GM/DL — SIGNIFICANT CHANGE UP (ref 6–8.3)
SODIUM SERPL-SCNC: 140 MMOL/L — SIGNIFICANT CHANGE UP (ref 135–145)

## 2020-04-02 PROCEDURE — 93010 ELECTROCARDIOGRAM REPORT: CPT

## 2020-04-02 PROCEDURE — 99233 SBSQ HOSP IP/OBS HIGH 50: CPT

## 2020-04-02 RX ORDER — PANTOPRAZOLE SODIUM 20 MG/1
40 TABLET, DELAYED RELEASE ORAL
Refills: 0 | Status: DISCONTINUED | OUTPATIENT
Start: 2020-04-02 | End: 2020-04-08

## 2020-04-02 RX ADMIN — ALBUTEROL 2 PUFF(S): 90 AEROSOL, METERED ORAL at 00:22

## 2020-04-02 RX ADMIN — SIMVASTATIN 10 MILLIGRAM(S): 20 TABLET, FILM COATED ORAL at 22:10

## 2020-04-02 RX ADMIN — CEFTRIAXONE 1000 MILLIGRAM(S): 500 INJECTION, POWDER, FOR SOLUTION INTRAMUSCULAR; INTRAVENOUS at 13:08

## 2020-04-02 RX ADMIN — Medication 60 MILLIGRAM(S): at 06:17

## 2020-04-02 RX ADMIN — AMLODIPINE BESYLATE 10 MILLIGRAM(S): 2.5 TABLET ORAL at 06:17

## 2020-04-02 RX ADMIN — ALBUTEROL 2 PUFF(S): 90 AEROSOL, METERED ORAL at 04:44

## 2020-04-02 RX ADMIN — Medication 200 MILLIGRAM(S): at 18:06

## 2020-04-02 RX ADMIN — ALBUTEROL 2 PUFF(S): 90 AEROSOL, METERED ORAL at 13:08

## 2020-04-02 RX ADMIN — Medication 200 MILLIGRAM(S): at 06:17

## 2020-04-02 RX ADMIN — ALBUTEROL 2 PUFF(S): 90 AEROSOL, METERED ORAL at 16:46

## 2020-04-02 RX ADMIN — ALBUTEROL 2 PUFF(S): 90 AEROSOL, METERED ORAL at 08:42

## 2020-04-02 RX ADMIN — ALBUTEROL 2 PUFF(S): 90 AEROSOL, METERED ORAL at 22:11

## 2020-04-02 RX ADMIN — Medication 60 MILLIGRAM(S): at 18:06

## 2020-04-02 RX ADMIN — ENOXAPARIN SODIUM 40 MILLIGRAM(S): 100 INJECTION SUBCUTANEOUS at 11:13

## 2020-04-02 NOTE — PROGRESS NOTE ADULT - SUBJECTIVE AND OBJECTIVE BOX
54y old Male with PMHx of HTN, HLD, and Asthma who presents with a chief complaint of shortness of breath, pleuritic chest pain, and subjective fevers for the last 3 days.  Pt notes he started having increased wheezing and SOB on Saturday.  He tried using his inhaler, but it was broken.  He felt hot but did not check his temperature.  He describes electric shocks in his chest when taking deep breaths.  He denies nausea/ vomiting/ diarrhea/ abd pain.  Someone he works with was sick but he is not sure what he had.  No other sick contacts.  In the ER, patient found to have hypoxia-- 92% on 4L now 95% on NRB mask.  WBC 21 with 2% lymph, elevated AST/ ALT.  QTc 414.  CXR with bilateral infiltrates.  COVID +.      3/31:  Patient seen and examined at bedside earlier today.  Feeling slightly better today.  Less SOB.  Tearful.  [place]  phone used to communicate with patient.    4/1:  Pt seen.  Oxygen titrated down.  Now on 5L.  Feeling better.  3  4/2:  Pt seen.  Feeling better overall since admission.  C/o some LUQ pain.  Some burning sensation.      Review of system- Rest of the review of system are negative except mentioned in HPI    Vital Signs Last 24 Hrs  T(C): 36.8 (02 Apr 2020 09:51), Max: 37.1 (01 Apr 2020 22:04)  T(F): 98.3 (02 Apr 2020 09:51), Max: 98.7 (01 Apr 2020 22:04)  HR: 102 (02 Apr 2020 09:51) (80 - 102)  BP: 138/70 (02 Apr 2020 09:51) (137/81 - 148/84)  BP(mean): --  RR: 20 (02 Apr 2020 09:51) (18 - 20)  SpO2: 94% (02 Apr 2020 09:51) (91% - 94%)    PHYSICAL EXAM:  GENERAL: NAD  NERVOUS SYSTEM:  Alert & Oriented X3, non- focal exam, Motor Strength 5/5 B/L upper and lower extremities; DTRs 2+ intact and symmetric  HEAD:  Atraumatic, Normocephalic  EYES: EOMI, PERRLA, conjunctiva and sclera clear  HEENT: Moist mucous membranes  NECK: Supple, No JVD  CHEST/LUNG: Clear to auscultation bilaterally; No rales, no rhonchi, no wheezing, or rubs  HEART: Regular rate and rhythm; No murmurs, rubs, or gallops  ABDOMEN: Soft, Nontender, Nondistended; Bowel sounds present  GENITOURINARY- Voiding, no suprapubic tenderness  EXTREMITIES:  2+ Peripheral Pulses, No clubbing, cyanosis, or edema  MUSCULOSKELETAL:- No muscle tenderness, Muscle tone normal, No joint tenderness, no Joint swelling, Joint range of motion-normal  SKIN-no rash, no lesion    04-02    140  |  108  |  22  ----------------------------<  122<H>  3.9   |  24  |  0.68    Ca    9.1      02 Apr 2020 08:12    TPro  6.8  /  Alb  2.7<L>  /  TBili  0.6  /  DBili  x   /  AST  40<H>  /  ALT  126<H>  /  AlkPhos  98  04-02    LIVER FUNCTIONS - ( 02 Apr 2020 08:12 )  Alb: 2.7 g/dL / Pro: 6.8 gm/dL / ALK PHOS: 98 U/L / ALT: 126 U/L / AST: 40 U/L / GGT: x 54y old Male with PMHx of HTN, HLD, and Asthma who presents with a chief complaint of shortness of breath, pleuritic chest pain, and subjective fevers for the last 3 days.  Pt notes he started having increased wheezing and SOB on Saturday.  He tried using his inhaler, but it was broken.  He felt hot but did not check his temperature.  He describes electric shocks in his chest when taking deep breaths.  He denies nausea/ vomiting/ diarrhea/ abd pain.  Someone he works with was sick but he is not sure what he had.  No other sick contacts.  In the ER, patient found to have hypoxia-- 92% on 4L now 95% on NRB mask.  WBC 21 with 2% lymph, elevated AST/ ALT.  QTc 414.  CXR with bilateral infiltrates.  COVID +.      3/31:  Patient seen and examined at bedside earlier today.  Feeling slightly better today.  Less SOB.  Tearful.  [employer]  phone used to communicate with patient.    4/1:  Pt seen.  Oxygen titrated down.  Now on 5L.  Feeling better.  3  4/2:  Pt seen.  Feeling better overall since admission.  C/o some LUQ pain.  Some burning sensation.  D/w patient via [employer]  [id number].    Review of system- Rest of the review of system are negative except mentioned in HPI    Vital Signs Last 24 Hrs  T(C): 36.8 (02 Apr 2020 09:51), Max: 37.1 (01 Apr 2020 22:04)  T(F): 98.3 (02 Apr 2020 09:51), Max: 98.7 (01 Apr 2020 22:04)  HR: 102 (02 Apr 2020 09:51) (80 - 102)  BP: 138/70 (02 Apr 2020 09:51) (137/81 - 148/84)  BP(mean): --  RR: 20 (02 Apr 2020 09:51) (18 - 20)  SpO2: 94% (02 Apr 2020 09:51) (91% - 94%)    PHYSICAL EXAM:  GENERAL: NAD  NERVOUS SYSTEM:  Alert & Oriented X3, non- focal exam, Motor Strength 5/5 B/L upper and lower extremities; DTRs 2+ intact and symmetric  HEAD:  Atraumatic, Normocephalic  EYES: EOMI, PERRLA, conjunctiva and sclera clear  HEENT: Moist mucous membranes  NECK: Supple, No JVD  CHEST/LUNG: Clear to auscultation bilaterally; No rales, no rhonchi, no wheezing, or rubs  HEART: Regular rate and rhythm; No murmurs, rubs, or gallops  ABDOMEN: Soft, Nontender, Nondistended; Bowel sounds present  GENITOURINARY- Voiding, no suprapubic tenderness  EXTREMITIES:  2+ Peripheral Pulses, No clubbing, cyanosis, or edema  MUSCULOSKELETAL:- No muscle tenderness, Muscle tone normal, No joint tenderness, no Joint swelling, Joint range of motion-normal  SKIN-no rash, no lesion    04-02    140  |  108  |  22  ----------------------------<  122<H>  3.9   |  24  |  0.68    Ca    9.1      02 Apr 2020 08:12    TPro  6.8  /  Alb  2.7<L>  /  TBili  0.6  /  DBili  x   /  AST  40<H>  /  ALT  126<H>  /  AlkPhos  98  04-02    LIVER FUNCTIONS - ( 02 Apr 2020 08:12 )  Alb: 2.7 g/dL / Pro: 6.8 gm/dL / ALK PHOS: 98 U/L / ALT: 126 U/L / AST: 40 U/L / GGT: x

## 2020-04-02 NOTE — PROGRESS NOTE ADULT - ASSESSMENT
54y old Male with PMHx of HTN, HLD, and Asthma who presents with a chief complaint of shortness of breath, pleuritic chest pain, and subjective fevers for the last 3 days.  COVID PNA.      #Acute Hypoxic Respiratory Failure secondary to COVID PNA:      Oxygen titrated down from NRB to 6L.  Overall improved.    Cont IV ABX-- Rocephin/ Azithro day #3.    Cont plaquenil COVID protocol day #3.  QTc 424.   Cont IV steroids day #3.    Code status d/w patient and family-- FULL CODE.  Would want intubation.    Cont albuterol MDI for wheezing q4 hours.    CRP trending down:  10 to 3.      #HTN:    Hold ACE/ HCTZ.  Cont CCB.  BP stable.      #HLD:    Cont statin.      #Elevated LFTs:    Hepatitis panel negative.    Suspect related to COVID.      #Asthma Exacerbation:    Secondary to COVID.  Albuterol MDI.  Steroids.      #GERD/ LUQ pain:    Start protonix and follow.      #DVT proph:  Lovenox.      #Code Status:  FULL CODE.

## 2020-04-03 LAB
CULTURE RESULTS: SIGNIFICANT CHANGE UP
CULTURE RESULTS: SIGNIFICANT CHANGE UP
SPECIMEN SOURCE: SIGNIFICANT CHANGE UP
SPECIMEN SOURCE: SIGNIFICANT CHANGE UP

## 2020-04-03 PROCEDURE — 99232 SBSQ HOSP IP/OBS MODERATE 35: CPT

## 2020-04-03 RX ADMIN — SIMVASTATIN 10 MILLIGRAM(S): 20 TABLET, FILM COATED ORAL at 20:54

## 2020-04-03 RX ADMIN — Medication 60 MILLIGRAM(S): at 16:55

## 2020-04-03 RX ADMIN — Medication 60 MILLIGRAM(S): at 04:40

## 2020-04-03 RX ADMIN — Medication 200 MILLIGRAM(S): at 16:54

## 2020-04-03 RX ADMIN — ALBUTEROL 2 PUFF(S): 90 AEROSOL, METERED ORAL at 17:51

## 2020-04-03 RX ADMIN — ALBUTEROL 2 PUFF(S): 90 AEROSOL, METERED ORAL at 11:00

## 2020-04-03 RX ADMIN — ALBUTEROL 2 PUFF(S): 90 AEROSOL, METERED ORAL at 17:55

## 2020-04-03 RX ADMIN — ENOXAPARIN SODIUM 40 MILLIGRAM(S): 100 INJECTION SUBCUTANEOUS at 11:35

## 2020-04-03 RX ADMIN — AMLODIPINE BESYLATE 10 MILLIGRAM(S): 2.5 TABLET ORAL at 04:41

## 2020-04-03 RX ADMIN — CEFTRIAXONE 1000 MILLIGRAM(S): 500 INJECTION, POWDER, FOR SOLUTION INTRAMUSCULAR; INTRAVENOUS at 11:35

## 2020-04-03 RX ADMIN — Medication 200 MILLIGRAM(S): at 04:41

## 2020-04-03 RX ADMIN — ALBUTEROL 2 PUFF(S): 90 AEROSOL, METERED ORAL at 04:35

## 2020-04-03 RX ADMIN — PANTOPRAZOLE SODIUM 40 MILLIGRAM(S): 20 TABLET, DELAYED RELEASE ORAL at 04:41

## 2020-04-03 RX ADMIN — ALBUTEROL 2 PUFF(S): 90 AEROSOL, METERED ORAL at 01:35

## 2020-04-03 NOTE — PROGRESS NOTE ADULT - ASSESSMENT
54y old Male with PMHx of HTN, HLD, and Asthma who presents with a chief complaint of shortness of breath, pleuritic chest pain, and subjective fevers for the last 3 days.  COVID PNA.      #Acute Hypoxic Respiratory Failure secondary to COVID PNA:    improving  Oxygen titrated down from NRB to 6L.  Overall improved.    Cont IV ABX-- Rocephin/ Azithro day #3.    Cont plaquenil COVID protocol day #3.  QTc 424.   Cont IV steroids day #3.    Code status d/w patient and family-- FULL CODE.  Would want intubation.    Cont albuterol MDI for wheezing q4 hours.    CRP trending down:  10 to 3.      #HTN:    Hold ACE/ HCTZ.  Cont CCB.  BP stable.      #HLD:    Cont statin.      #Elevated LFTs:    Hepatitis panel negative.    Suspect related to COVID.      #Asthma Exacerbation:    Secondary to COVID.  Albuterol MDI.  Steroids.      #GERD/ LUQ pain:    Start protonix and follow.      #DVT proph:  Lovenox.      #Code Status:  FULL CODE.

## 2020-04-03 NOTE — PROGRESS NOTE ADULT - SUBJECTIVE AND OBJECTIVE BOX
54y old Male with PMHx of HTN, HLD, and Asthma who presents with a chief complaint of shortness of breath, pleuritic chest pain, and subjective fevers for the last 3 days.  Pt notes he started having increased wheezing and SOB on Saturday.  He tried using his inhaler, but it was broken.  He felt hot but did not check his temperature.  He describes electric shocks in his chest when taking deep breaths.  He denies nausea/ vomiting/ diarrhea/ abd pain.  Someone he works with was sick but he is not sure what he had.  No other sick contacts.  In the ER, patient found to have hypoxia-- 92% on 4L now 95% on NRB mask.  WBC 21 with 2% lymph, elevated AST/ ALT.  QTc 414.  CXR with bilateral infiltrates.  COVID +.      3/31:  Patient seen and examined at bedside earlier today.  Feeling slightly better today.  Less SOB.  Tearful.  [place]  phone used to communicate with patient.    4/1:  Pt seen.  Oxygen titrated down.  Now on 5L.  Feeling better.  3  4/2:  Pt seen.  Feeling better overall since admission.  C/o some LUQ pain.  Some burning sensation.  D/w patient via [place]  [id number].  4/3: feeling better; less sob; decrease O2 to 3 L nc    Review of system- Rest of the review of system are negative except mentioned in HPI    Vital Signs Last 24 Hrs  T(C): 36.2 (03 Apr 2020 11:10), Max: 36.8 (02 Apr 2020 17:50)  T(F): 97.1 (03 Apr 2020 11:10), Max: 98.3 (02 Apr 2020 17:50)  HR: 110 (03 Apr 2020 11:10) (77 - 110)  BP: 134/66 (03 Apr 2020 11:10) (134/66 - 150/82)  BP(mean): --  RR: 18 (03 Apr 2020 04:33) (18 - 20)  SpO2: 98% (03 Apr 2020 11:10) (94% - 98%)    PHYSICAL EXAM:  GENERAL: NAD  NERVOUS SYSTEM:  Alert & Oriented X3, non- focal exam, Motor Strength 5/5 B/L upper and lower extremities; DTRs 2+ intact and symmetric  HEAD:  Atraumatic, Normocephalic  EYES: EOMI, PERRLA, conjunctiva and sclera clear  HEENT: Moist mucous membranes  NECK: Supple, No JVD  CHEST/LUNG: Clear to auscultation bilaterally; No rales, no rhonchi, no wheezing, or rubs  HEART: Regular rate and rhythm; No murmurs, rubs, or gallops  ABDOMEN: Soft, Nontender, Nondistended; Bowel sounds present  GENITOURINARY- Voiding, no suprapubic tenderness  EXTREMITIES:  2+ Peripheral Pulses, No clubbing, cyanosis, or edema  MUSCULOSKELETAL:- No muscle tenderness, Muscle tone normal, No joint tenderness, no Joint swelling, Joint range of motion-normal  SKIN-no rash, no lesion    04-02    140  |  108  |  22  ----------------------------<  122<H>  3.9   |  24  |  0.68    Ca    9.1      02 Apr 2020 08:12    TPro  6.8  /  Alb  2.7<L>  /  TBili  0.6  /  DBili  x   /  AST  40<H>  /  ALT  126<H>  /  AlkPhos  98  04-02    LIVER FUNCTIONS - ( 02 Apr 2020 08:12 )  Alb: 2.7 g/dL / Pro: 6.8 gm/dL / ALK PHOS: 98 U/L / ALT: 126 U/L / AST: 40 U/L / GGT: x

## 2020-04-04 LAB — D DIMER BLD IA.RAPID-MCNC: 155 NG/ML DDU — SIGNIFICANT CHANGE UP

## 2020-04-04 PROCEDURE — 99232 SBSQ HOSP IP/OBS MODERATE 35: CPT

## 2020-04-04 RX ADMIN — ALBUTEROL 2 PUFF(S): 90 AEROSOL, METERED ORAL at 08:00

## 2020-04-04 RX ADMIN — ALBUTEROL 2 PUFF(S): 90 AEROSOL, METERED ORAL at 12:00

## 2020-04-04 RX ADMIN — ALBUTEROL 2 PUFF(S): 90 AEROSOL, METERED ORAL at 18:11

## 2020-04-04 RX ADMIN — ALBUTEROL 2 PUFF(S): 90 AEROSOL, METERED ORAL at 06:15

## 2020-04-04 RX ADMIN — Medication 60 MILLIGRAM(S): at 06:17

## 2020-04-04 RX ADMIN — Medication 60 MILLIGRAM(S): at 16:42

## 2020-04-04 RX ADMIN — AMLODIPINE BESYLATE 10 MILLIGRAM(S): 2.5 TABLET ORAL at 06:16

## 2020-04-04 RX ADMIN — ALBUTEROL 2 PUFF(S): 90 AEROSOL, METERED ORAL at 21:00

## 2020-04-04 RX ADMIN — CEFTRIAXONE 1000 MILLIGRAM(S): 500 INJECTION, POWDER, FOR SOLUTION INTRAMUSCULAR; INTRAVENOUS at 12:13

## 2020-04-04 RX ADMIN — ENOXAPARIN SODIUM 40 MILLIGRAM(S): 100 INJECTION SUBCUTANEOUS at 12:13

## 2020-04-04 RX ADMIN — PANTOPRAZOLE SODIUM 40 MILLIGRAM(S): 20 TABLET, DELAYED RELEASE ORAL at 06:17

## 2020-04-04 RX ADMIN — Medication 200 MILLIGRAM(S): at 06:16

## 2020-04-04 RX ADMIN — SIMVASTATIN 10 MILLIGRAM(S): 20 TABLET, FILM COATED ORAL at 22:40

## 2020-04-04 NOTE — PROGRESS NOTE ADULT - SUBJECTIVE AND OBJECTIVE BOX
54y old Male with PMHx of HTN, HLD, and Asthma who presents with a chief complaint of shortness of breath, pleuritic chest pain, and subjective fevers for the last 3 days.  Pt notes he started having increased wheezing and SOB on Saturday.  He tried using his inhaler, but it was broken.  He felt hot but did not check his temperature.  He describes electric shocks in his chest when taking deep breaths.  He denies nausea/ vomiting/ diarrhea/ abd pain.  Someone he works with was sick but he is not sure what he had.  No other sick contacts.  In the ER, patient found to have hypoxia-- 92% on 4L now 95% on NRB mask.  WBC 21 with 2% lymph, elevated AST/ ALT.  QTc 414.  CXR with bilateral infiltrates.  COVID +.      3/31:  Patient seen and examined at bedside earlier today.  Feeling slightly better today.  Less SOB.  Tearful.  [place]  phone used to communicate with patient.    4/1:  Pt seen.  Oxygen titrated down.  Now on 5L.  Feeling better.  3  4/2:  Pt seen.  Feeling better overall since admission.  C/o some LUQ pain.  Some burning sensation.  D/w patient via [employer]  [id number].  4/3: feeling better; less sob; decrease O2 to 3 L nc  4/4: no complaints; on nc at 3 L    Review of system- Rest of the review of system are negative except mentioned in HPI      T(C): 36.3 (04 Apr 2020 03:55), Max: 36.6 (03 Apr 2020 17:46)  T(F): 97.3 (04 Apr 2020 03:55), Max: 97.9 (03 Apr 2020 17:46)  HR: 85 (04 Apr 2020 10:45) (68 - 98)  BP: 117/68 (04 Apr 2020 10:45) (117/68 - 137/88)  BP(mean): --  ABP: --  ABP(mean): --  RR: 18 (04 Apr 2020 10:45) (18 - 18)  SpO2: 94% (04 Apr 2020 10:45) (91% - 95%)      PHYSICAL EXAM:  GENERAL: NAD  NERVOUS SYSTEM:  Alert & Oriented X3, non- focal exam, Motor Strength 5/5 B/L upper and lower extremities; DTRs 2+ intact and symmetric  HEAD:  Atraumatic, Normocephalic  EYES: EOMI, PERRLA, conjunctiva and sclera clear  HEENT: Moist mucous membranes  NECK: Supple, No JVD  CHEST/LUNG: Clear to auscultation bilaterally; No rales, no rhonchi, no wheezing, or rubs  HEART: Regular rate and rhythm; No murmurs, rubs, or gallops  ABDOMEN: Soft, Nontender, Nondistended; Bowel sounds present  GENITOURINARY- Voiding, no suprapubic tenderness  EXTREMITIES:  2+ Peripheral Pulses, No clubbing, cyanosis, or edema  MUSCULOSKELETAL:- No muscle tenderness, Muscle tone normal, No joint tenderness, no Joint swelling, Joint range of motion-normal  SKIN-no rash, no lesion    04-02    140  |  108  |  22  ----------------------------<  122<H>  3.9   |  24  |  0.68    Ca    9.1      02 Apr 2020 08:12    TPro  6.8  /  Alb  2.7<L>  /  TBili  0.6  /  DBili  x   /  AST  40<H>  /  ALT  126<H>  /  AlkPhos  98  04-02    LIVER FUNCTIONS - ( 02 Apr 2020 08:12 )  Alb: 2.7 g/dL / Pro: 6.8 gm/dL / ALK PHOS: 98 U/L / ALT: 126 U/L / AST: 40 U/L / GGT: x

## 2020-04-05 PROCEDURE — 99231 SBSQ HOSP IP/OBS SF/LOW 25: CPT

## 2020-04-05 RX ADMIN — Medication 60 MILLIGRAM(S): at 18:27

## 2020-04-05 RX ADMIN — ALBUTEROL 2 PUFF(S): 90 AEROSOL, METERED ORAL at 00:49

## 2020-04-05 RX ADMIN — ENOXAPARIN SODIUM 40 MILLIGRAM(S): 100 INJECTION SUBCUTANEOUS at 11:44

## 2020-04-05 RX ADMIN — ALBUTEROL 2 PUFF(S): 90 AEROSOL, METERED ORAL at 11:45

## 2020-04-05 RX ADMIN — PANTOPRAZOLE SODIUM 40 MILLIGRAM(S): 20 TABLET, DELAYED RELEASE ORAL at 05:37

## 2020-04-05 RX ADMIN — SIMVASTATIN 10 MILLIGRAM(S): 20 TABLET, FILM COATED ORAL at 20:54

## 2020-04-05 RX ADMIN — ALBUTEROL 2 PUFF(S): 90 AEROSOL, METERED ORAL at 08:15

## 2020-04-05 RX ADMIN — Medication 60 MILLIGRAM(S): at 05:36

## 2020-04-05 RX ADMIN — ALBUTEROL 2 PUFF(S): 90 AEROSOL, METERED ORAL at 19:44

## 2020-04-05 RX ADMIN — ALBUTEROL 2 PUFF(S): 90 AEROSOL, METERED ORAL at 04:36

## 2020-04-05 RX ADMIN — AMLODIPINE BESYLATE 10 MILLIGRAM(S): 2.5 TABLET ORAL at 05:36

## 2020-04-05 RX ADMIN — ALBUTEROL 2 PUFF(S): 90 AEROSOL, METERED ORAL at 18:27

## 2020-04-05 NOTE — PROGRESS NOTE ADULT - SUBJECTIVE AND OBJECTIVE BOX
54y old Male with PMHx of HTN, HLD, and Asthma who presents with a chief complaint of shortness of breath, pleuritic chest pain, and subjective fevers for the last 3 days.  Pt notes he started having increased wheezing and SOB on Saturday.  He tried using his inhaler, but it was broken.  He felt hot but did not check his temperature.  He describes electric shocks in his chest when taking deep breaths.  He denies nausea/ vomiting/ diarrhea/ abd pain.  Someone he works with was sick but he is not sure what he had.  No other sick contacts.  In the ER, patient found to have hypoxia-- 92% on 4L now 95% on NRB mask.  WBC 21 with 2% lymph, elevated AST/ ALT.  QTc 414.  CXR with bilateral infiltrates.  COVID +.      3/31:  Patient seen and examined at bedside earlier today.  Feeling slightly better today.  Less SOB.  Tearful.  [place]  phone used to communicate with patient.    4/1:  Pt seen.  Oxygen titrated down.  Now on 5L.  Feeling better.  3  4/2:  Pt seen.  Feeling better overall since admission.  C/o some LUQ pain.  Some burning sensation.  D/w patient via [place]  [id number].  4/3: feeling better; less sob; decrease O2 to 3 L nc  4/4: no complaints; on nc at 3 L  4/5: pt stable on 3 L O2 nc; decrease to 2 L today; remains afebrile    Review of system- Rest of the review of system are negative except mentioned in HPI      Vital Signs Last 24 Hrs  T(C): 35.9 (05 Apr 2020 09:25), Max: 36.7 (04 Apr 2020 10:45)  T(F): 96.7 (05 Apr 2020 09:25), Max: 98.1 (04 Apr 2020 10:45)  HR: 91 (05 Apr 2020 05:37) (81 - 102)  BP: 139/74 (04 Apr 2020 22:12) (117/68 - 139/74)  BP(mean): --  RR: 18 (05 Apr 2020 05:37) (18 - 18)  SpO2: 97% (05 Apr 2020 09:25) (93% - 97%)      PHYSICAL EXAM:  GENERAL: NAD  NERVOUS SYSTEM:  Alert & Oriented X3, non- focal exam, Motor Strength 5/5 B/L upper and lower extremities; DTRs 2+ intact and symmetric  HEAD:  Atraumatic, Normocephalic  EYES: EOMI, PERRLA, conjunctiva and sclera clear  HEENT: Moist mucous membranes  NECK: Supple, No JVD  CHEST/LUNG: Clear to auscultation bilaterally; No rales, no rhonchi, no wheezing, or rubs  HEART: Regular rate and rhythm; No murmurs, rubs, or gallops  ABDOMEN: Soft, Nontender, Nondistended; Bowel sounds present  GENITOURINARY- Voiding, no suprapubic tenderness  EXTREMITIES:  2+ Peripheral Pulses, No clubbing, cyanosis, or edema  MUSCULOSKELETAL:- No muscle tenderness, Muscle tone normal, No joint tenderness, no Joint swelling, Joint range of motion-normal  SKIN-no rash, no lesion    04-02    140  |  108  |  22  ----------------------------<  122<H>  3.9   |  24  |  0.68    Ca    9.1      02 Apr 2020 08:12    TPro  6.8  /  Alb  2.7<L>  /  TBili  0.6  /  DBili  x   /  AST  40<H>  /  ALT  126<H>  /  AlkPhos  98  04-02    LIVER FUNCTIONS - ( 02 Apr 2020 08:12 )  Alb: 2.7 g/dL / Pro: 6.8 gm/dL / ALK PHOS: 98 U/L / ALT: 126 U/L / AST: 40 U/L / GGT: x             MEDICATIONS  (STANDING):  ALBUTerol    90 MICROgram(s) HFA Inhaler 2 Puff(s) Inhalation every 4 hours  amLODIPine   Tablet 10 milliGRAM(s) Oral daily  enoxaparin Injectable 40 milliGRAM(s) SubCutaneous daily  methylPREDNISolone sodium succinate Injectable 60 milliGRAM(s) IV Push every 12 hours  pantoprazole    Tablet 40 milliGRAM(s) Oral before breakfast  simvastatin 10 milliGRAM(s) Oral at bedtime

## 2020-04-05 NOTE — PROGRESS NOTE ADULT - ASSESSMENT
54y old Male with PMHx of HTN, HLD, and Asthma who presents with a chief complaint of shortness of breath, pleuritic chest pain, and subjective fevers for the last 3 days.  COVID PNA.      #Acute Hypoxic Respiratory Failure secondary to COVID PNA:    improving; decrease O2 to 2L nc  Oxygen titrated down from NRB to 3L.  Overall improved.    d/rama Rocephin/ Azithro  completed plaquenil COVID protocol   Cont IV steroids  Code status d/w patient and family-- FULL CODE.  Would want intubation.    Cont albuterol MDI for wheezing q4 hours.  prn  CRP trending down:  10 to 3.      #HTN:    Hold ACE/ HCTZ.  Cont CCB.  BP stable.      #HLD:    Cont statin.      #Elevated LFTs:    Hepatitis panel negative.    Suspect related to COVID.      #Asthma Exacerbation:    Secondary to COVID.  Albuterol MDI.  Steroids.      #GERD/ LUQ pain:    Start protonix and follow.      #DVT proph:  Lovenox.      #Code Status:  FULL CODE.

## 2020-04-06 LAB
ADD ON TEST-SPECIMEN IN LAB: SIGNIFICANT CHANGE UP
ADD ON TEST-SPECIMEN IN LAB: SIGNIFICANT CHANGE UP
D DIMER BLD IA.RAPID-MCNC: <150 NG/ML DDU — SIGNIFICANT CHANGE UP

## 2020-04-06 PROCEDURE — 71045 X-RAY EXAM CHEST 1 VIEW: CPT | Mod: 26

## 2020-04-06 PROCEDURE — 93010 ELECTROCARDIOGRAM REPORT: CPT

## 2020-04-06 PROCEDURE — 99232 SBSQ HOSP IP/OBS MODERATE 35: CPT

## 2020-04-06 RX ORDER — FUROSEMIDE 40 MG
20 TABLET ORAL ONCE
Refills: 0 | Status: COMPLETED | OUTPATIENT
Start: 2020-04-07 | End: 2020-04-07

## 2020-04-06 RX ORDER — MONTELUKAST 4 MG/1
10 TABLET, CHEWABLE ORAL AT BEDTIME
Refills: 0 | Status: DISCONTINUED | OUTPATIENT
Start: 2020-04-06 | End: 2020-04-08

## 2020-04-06 RX ADMIN — ALBUTEROL 2 PUFF(S): 90 AEROSOL, METERED ORAL at 20:56

## 2020-04-06 RX ADMIN — SIMVASTATIN 10 MILLIGRAM(S): 20 TABLET, FILM COATED ORAL at 20:57

## 2020-04-06 RX ADMIN — ALBUTEROL 2 PUFF(S): 90 AEROSOL, METERED ORAL at 18:58

## 2020-04-06 RX ADMIN — ALBUTEROL 2 PUFF(S): 90 AEROSOL, METERED ORAL at 11:14

## 2020-04-06 RX ADMIN — AMLODIPINE BESYLATE 10 MILLIGRAM(S): 2.5 TABLET ORAL at 06:33

## 2020-04-06 RX ADMIN — ALBUTEROL 2 PUFF(S): 90 AEROSOL, METERED ORAL at 00:29

## 2020-04-06 RX ADMIN — Medication 40 MILLIGRAM(S): at 18:54

## 2020-04-06 RX ADMIN — ALBUTEROL 2 PUFF(S): 90 AEROSOL, METERED ORAL at 06:34

## 2020-04-06 RX ADMIN — Medication 60 MILLIGRAM(S): at 06:33

## 2020-04-06 RX ADMIN — ENOXAPARIN SODIUM 40 MILLIGRAM(S): 100 INJECTION SUBCUTANEOUS at 11:15

## 2020-04-06 RX ADMIN — MONTELUKAST 10 MILLIGRAM(S): 4 TABLET, CHEWABLE ORAL at 11:14

## 2020-04-06 RX ADMIN — PANTOPRAZOLE SODIUM 40 MILLIGRAM(S): 20 TABLET, DELAYED RELEASE ORAL at 06:34

## 2020-04-06 RX ADMIN — Medication 600 MILLIGRAM(S): at 18:54

## 2020-04-06 NOTE — PROGRESS NOTE ADULT - ASSESSMENT
54y old Male with PMHx of HTN, HLD, and Asthma who presents with a chief complaint of shortness of breath, pleuritic chest pain, and subjective fevers.  COVID PNA.      #Acute Hypoxic Respiratory Failure secondary to COVID PNA:    improving; check pulse ox on ambulation  Oxygen titrated down from NRB to 3L.  Overall improved.    d/rama Rocephin/ Azithro  completed plaquenil COVID protocol   Cont IV steroids, add albuterol, mucinex  Code status d/w patient and family-- FULL CODE.  Would want intubation.    albuterol MDI standing q4 hours.  prn  CRP trending down:  10 to 3 to 0.5      #HTN:    Hold ACE/ HCTZ.  Cont CCB.  BP stable.      #HLD:    Cont statin.      #Elevated LFTs:    Hepatitis panel negative.    Suspect related to COVID.      #Asthma Exacerbation:    Secondary to COVID.  Albuterol MDI.  Steroids.      #GERD/ LUQ pain:    Start protonix and follow.      #DVT proph:  Lovenox.      #Code Status:  FULL CODE. 54y old Male with PMHx of HTN, HLD, and Asthma who presents with a chief complaint of shortness of breath, pleuritic chest pain, and subjective fevers.  COVID PNA.      Acute Hypoxic Respiratory Failure secondary to COVID-19 viral syndrome with superimposed PNA with underlying Asthma exacebration  Oxygen titrated down from NRB to 3L.  Overall improved.    Patient has acute diagnosis of COVID- 19  and requires home O2 to increase hospital capacity and mitigate risk.   d/rama Rocephin/ Azithro, s/p  plaquenil COVID protocol   Cont IV steroids--> taper down will need po taper , add albuterol, mucinex  Code status d/w patient and family-- FULL CODE.  Would want intubation.     - restart cingular , restart advair upon d/c   CRP trending down:  10 to 3 to 0.5    * Leukocytosis , steroids induced  - repeat cbc in am  * Hyperglycemia steroids induced - check A1C   * HTN - Hold ACE/ HCTZ.  Cont CCB.  BP stable.  lasix 20 in am for BP control  * HLD - cont statin.    * Mild transaminitis, improved  - Hepatitis panel negative.  Suspect related to COVID.    * GERD/ LUQ pain:  Start protonix and follow.    Code Status:  FULL CODE.    DVT proph:  Lovenox.      Dispo - plan for discharge , labs in am

## 2020-04-06 NOTE — PROGRESS NOTE ADULT - SUBJECTIVE AND OBJECTIVE BOX
54y old Male with PMHx of HTN, HLD, and Asthma who presents with a chief complaint of shortness of breath, pleuritic chest pain, and subjective fevers for the last 3 days.  Pt notes he started having increased wheezing and SOB on Saturday.  He tried using his inhaler, but it was broken.  He felt hot but did not check his temperature.  He describes electric shocks in his chest when taking deep breaths.  He denies nausea/ vomiting/ diarrhea/ abd pain.  Someone he works with was sick but he is not sure what he had.  No other sick contacts.  In the ER, patient found to have hypoxia-- 92% on 4L now 95% on NRB mask.  WBC 21 with 2% lymph, elevated AST/ ALT.  QTc 414.  CXR with bilateral infiltrates.  COVID +.      3/31:  Patient seen and examined at bedside earlier today.  Feeling slightly better today.  Less SOB.  Tearful.  [place]  phone used to communicate with patient.    4/1:  Pt seen.  Oxygen titrated down.  Now on 5L.  Feeling better.  3  4/2:  Pt seen.  Feeling better overall since admission.  C/o some LUQ pain.  Some burning sensation.  D/w patient via [employer]"  [id number].  4/3: feeling better; less sob; decrease O2 to 3 L nc  4/4: no complaints; on nc at 3 L  4/5: pt stable on 3 L O2 nc; decrease to 2 L today; remains afebrile  4/6: pt 94% on 3L, down to 91% RA. c/o chest pain    Review of system- Rest of the review of system are negative except mentioned in HPI      Vital Signs Last 24 Hrs  T(C): 36.2 (06 Apr 2020 08:45), Max: 36.5 (05 Apr 2020 13:49)  T(F): 97.1 (06 Apr 2020 08:45), Max: 97.7 (05 Apr 2020 13:49)  HR: 92 (06 Apr 2020 08:45) (77 - 96)  BP: 127/90 (06 Apr 2020 08:45) (127/90 - 141/89)  BP(mean): --  RR: 18 (06 Apr 2020 08:45) (18 - 18)  SpO2: 95% (06 Apr 2020 08:45) (93% - 96%)      PHYSICAL EXAM:  GENERAL: NAD  NERVOUS SYSTEM:  Alert & Oriented X3, non- focal exam  HEAD:  Atraumatic, Normocephalic  EYES: EOMI, PERRLA, conjunctiva and sclera clear  HEENT: Moist mucous membranes  NECK: Supple, No JVD  CHEST/LUNG: Clear to auscultation bilaterally; diminished breath shouds bilaterally. No rales, no rhonchi, no wheezing, or rubs  HEART: Regular rate and rhythm; No murmurs, rubs, or gallops  ABDOMEN: Soft, Nontender, Nondistended; Bowel sounds present  GENITOURINARY- Voiding, no suprapubic tenderness  EXTREMITIES:  2+ Peripheral Pulses, No clubbing, cyanosis, or edema  MUSCULOSKELETAL:- No muscle tenderness, Muscle tone normal, No joint tenderness, no Joint swelling, Joint range of motion-normal  SKIN-no rash, no lesion                          16.0   21.08 )-----------( 360      ( 06 Apr 2020 07:38 )             46.4   04-06    136  |  106  |  19  ----------------------------<  134<H>  3.9   |  22  |  0.70    Ca    8.9      06 Apr 2020 07:38    TPro  6.1  /  Alb  2.7<L>  /  TBili  0.6  /  DBili  x   /  AST  12<L>  /  ALT  71  /  AlkPhos  78  04-06      MEDICATIONS  (STANDING):  ALBUTerol    90 MICROgram(s) HFA Inhaler 2 Puff(s) Inhalation every 4 hours  amLODIPine   Tablet 10 milliGRAM(s) Oral daily  enoxaparin Injectable 40 milliGRAM(s) SubCutaneous daily  guaiFENesin  milliGRAM(s) Oral every 12 hours  methylPREDNISolone sodium succinate Injectable 60 milliGRAM(s) IV Push every 12 hours  montelukast 10 milliGRAM(s) Oral at bedtime  pantoprazole    Tablet 40 milliGRAM(s) Oral before breakfast  simvastatin 10 milliGRAM(s) Oral at bedtime    MEDICATIONS  (PRN):  acetaminophen   Tablet .. 1000 milliGRAM(s) Oral every 6 hours PRN Mild Pain (1 - 3)  aluminum hydroxide/magnesium hydroxide/simethicone Suspension 30 milliLiter(s) Oral every 4 hours PRN Dyspepsia  ondansetron Injectable 4 milliGRAM(s) IV Push every 6 hours PRN Nausea and/or Vomiting 54y old Male with PMHx of HTN, HLD, and Asthma who presents with a chief complaint of shortness of breath, pleuritic chest pain, and subjective fevers for the last 3 days.  Pt notes he started having increased wheezing and SOB on Saturday.  He tried using his inhaler, but it was broken.  He felt hot but did not check his temperature.  He describes electric shocks in his chest when taking deep breaths.  He denies nausea/ vomiting/ diarrhea/ abd pain.  Someone he works with was sick but he is not sure what he had.  No other sick contacts.  In the ER, patient found to have hypoxia-- 92% on 4L now 95% on NRB mask.  WBC 21 with 2% lymph, elevated AST/ ALT.  QTc 414.  CXR with bilateral infiltrates.  COVID +.    PCP: Dr Dowell @ [facility]    3/31:  Patient seen and examined at bedside earlier today.  Feeling slightly better today.  Less SOB.  Tearful.  [place]  phone used to communicate with patient.    4/1:  Pt seen.  Oxygen titrated down.  Now on 5L.  Feeling better.  3  4/2:  Pt seen.  Feeling better overall since admission.  C/o some LUQ pain.  Some burning sensation.  D/w patient via [employer]  [id number].  4/3: feeling better; less sob; decrease O2 to 3 L nc  4/4: no complaints; on nc at 3 L  4/5: pt stable on 3 L O2 nc; decrease to 2 L today; remains afebrile  4/6: pt 94% on 3L, down to 91% RA. c/o chest pain. [employer]  [facility] #[id number]    Review of system- Rest of the review of system are negative except mentioned in HPI      Vital Signs Last 24 Hrs  T(C): 36.2 (06 Apr 2020 08:45), Max: 36.5 (05 Apr 2020 13:49)  T(F): 97.1 (06 Apr 2020 08:45), Max: 97.7 (05 Apr 2020 13:49)  HR: 92 (06 Apr 2020 08:45) (77 - 96)  BP: 127/90 (06 Apr 2020 08:45) (127/90 - 141/89)  BP(mean): --  RR: 18 (06 Apr 2020 08:45) (18 - 18)  SpO2: 95% (06 Apr 2020 08:45) (93% - 96%)      PHYSICAL EXAM:  GENERAL: NAD  NERVOUS SYSTEM:  Alert & Oriented X3, non- focal exam  HEAD:  Atraumatic, Normocephalic  EYES: EOMI, PERRLA, conjunctiva and sclera clear  HEENT: Moist mucous membranes  NECK: Supple, No JVD  CHEST/LUNG: Clear to auscultation bilaterally; diminished breath shouds bilaterally. No rales, no rhonchi, no wheezing, or rubs  HEART: Regular rate and rhythm; No murmurs, rubs, or gallops  ABDOMEN: Soft, Nontender, Nondistended; Bowel sounds present  GENITOURINARY- Voiding, no suprapubic tenderness  EXTREMITIES:  2+ Peripheral Pulses, No clubbing, cyanosis, or edema  MUSCULOSKELETAL:- No muscle tenderness, Muscle tone normal, No joint tenderness, no Joint swelling, Joint range of motion-normal  SKIN-no rash, no lesion                          16.0   21.08 )-----------( 360      ( 06 Apr 2020 07:38 )             46.4   04-06    136  |  106  |  19  ----------------------------<  134<H>  3.9   |  22  |  0.70    Ca    8.9      06 Apr 2020 07:38    TPro  6.1  /  Alb  2.7<L>  /  TBili  0.6  /  DBili  x   /  AST  12<L>  /  ALT  71  /  AlkPhos  78  04-06      MEDICATIONS  (STANDING):  ALBUTerol    90 MICROgram(s) HFA Inhaler 2 Puff(s) Inhalation every 4 hours  amLODIPine   Tablet 10 milliGRAM(s) Oral daily  enoxaparin Injectable 40 milliGRAM(s) SubCutaneous daily  guaiFENesin  milliGRAM(s) Oral every 12 hours  methylPREDNISolone sodium succinate Injectable 60 milliGRAM(s) IV Push every 12 hours  montelukast 10 milliGRAM(s) Oral at bedtime  pantoprazole    Tablet 40 milliGRAM(s) Oral before breakfast  simvastatin 10 milliGRAM(s) Oral at bedtime    MEDICATIONS  (PRN):  acetaminophen   Tablet .. 1000 milliGRAM(s) Oral every 6 hours PRN Mild Pain (1 - 3)  aluminum hydroxide/magnesium hydroxide/simethicone Suspension 30 milliLiter(s) Oral every 4 hours PRN Dyspepsia  ondansetron Injectable 4 milliGRAM(s) IV Push every 6 hours PRN Nausea and/or Vomiting cc - dyspnea, cough , chest tightness      54y old Male with PMHx of HTN, HLD, and Asthma who presents 3/30/20 with a chief complaint of shortness of breath, pleuritic chest pain, and subjective fevers for the last 3 days.  Pt notes he started having increased wheezing and SOB on Saturday.  He describes electric shocks in his chest when taking deep breaths.  .  In the ER, patient found to have hypoxia-- 92% on 4L now 95% on NRB mask.  WBC 21 with 2% lymph, elevated AST/ ALT.  QTc 414.  CXR with bilateral infiltrates.  COVID +.   PCP: Dr Martins @ [facility]   3/31:  Patient seen and examined at bedside earlier today.  Feeling slightly better today.  Less SOB.  Tearful.  [place]  phone used to communicate with patient.    4/1:  Pt seen.  Oxygen titrated down.  Now on 5L.  Feeling better.  3  4/2:  Pt seen.  Feeling better overall since admission.  C/o some LUQ pain.  Some burning sensation.  D/w patient via [place]  [id number].  4/3: feeling better; less sob; decrease O2 to 3 L nc  4/4: no complaints; on nc at 3 L  4/5: pt stable on 3 L O2 nc; decrease to 2 L today; remains afebrile  4/6: pt 94% on 3L, down to 91% RA. c/o chest pain, cough productive , dyspnea on exertion denies abdominal pain, urinary problems  [employer]  [facility] #[id number]    Review of system- Rest of the review of system are negative except mentioned in HPI    T(C): 36.1 (04-06-20 @ 17:30), Max: 36.2 (04-05-20 @ 20:54)  T(F): 96.9 (04-06-20 @ 17:30), Max: 97.1 (04-05-20 @ 20:54)  HR: 98 (04-06-20 @ 17:30) (77 - 98)  BP: 143/85 (04-06-20 @ 17:30) (127/90 - 143/85)  RR: 18 (04-06-20 @ 17:30) (18 - 18)  SpO2: 96% (04-06-20 @ 17:30) (88% - 96%)  Wt(kg): --    PHYSICAL EXAM:  GENERAL: NAD  NERVOUS SYSTEM:  Alert & Oriented X3, non- focal exam  HEAD:  Atraumatic, Normocephalic  EYES: EOMI, PERRLA, conjunctiva and sclera clear  HEENT: Moist mucous membranes  NECK: Supple, No JVD  CHEST/LUNG: Clear to auscultation bilaterally; diminished breath shouds bilaterally. No rales, no rhonchi, no wheezing, or rubs  HEART: Regular rate and rhythm; No murmurs, rubs, or gallops  ABDOMEN: Soft, Nontender, Nondistended; Bowel sounds present  GENITOURINARY- Voiding, no suprapubic tenderness  EXTREMITIES:  2+ Peripheral Pulses, No clubbing, cyanosis, or edema  MUSCULOSKELETAL:- No muscle tenderness, Muscle tone normal, No joint tenderness, no Joint swelling, Joint range of motion-normal  SKIN-no rash, no lesion                          16.0   21.08 )-----------( 360      ( 06 Apr 2020 07:38 )             46.4   Differential (04.06.20 @ 07:38)    Auto Neutrophil #: 18.97 K/uL    Auto Neutrophil %: 88.0: Differential percentages must be correlated with absolute numbers for  clinical significance. %  Complete Blood Count + Automated Diff (03.30.20 @ 14:23)    Auto Neutrophil #: 19.76 K/uL    Auto Neutrophil %: 91.7: Differential percentages must be correlated with absolute numbers for  clinical significance. %  Differential (04.06.20 @ 07:38)    Auto Lymphocyte %: 3.0 %    Auto Lymphocyte #: 0.63 K/uL  Complete Blood Count + Automated Diff (03.30.20 @ 14:23)    Auto Lymphocyte #: 0.57 K/uL    Auto Lymphocyte %: 2.6 %  C-Reactive Protein, Serum (04.05.20 @ 08:46)    C-Reactive Protein, Serum: 0.56 mg/dL    04-06    136  |  106  |  19  ----------------------------<  134<H>  3.9   |  22  |  0.70    Ca    8.9      06 Apr 2020 07:38    TPro  6.1  /  Alb  2.7<L>  /  TBili  0.6  /  DBili  x   /  AST  12<L>  /  ALT  71  /  AlkPhos  78  04-06    MEDICATIONS  (STANDING):  ALBUTerol    90 MICROgram(s) HFA Inhaler 2 Puff(s) Inhalation every 4 hours  amLODIPine   Tablet 10 milliGRAM(s) Oral daily  enoxaparin Injectable 40 milliGRAM(s) SubCutaneous daily  guaiFENesin  milliGRAM(s) Oral every 12 hours  methylPREDNISolone sodium succinate Injectable 40 milliGRAM(s) IV Push every 12 hours  montelukast 10 milliGRAM(s) Oral at bedtime  pantoprazole    Tablet 40 milliGRAM(s) Oral before breakfast  simvastatin 10 milliGRAM(s) Oral at bedtime    MEDICATIONS  (PRN):  acetaminophen   Tablet .. 1000 milliGRAM(s) Oral every 6 hours PRN Mild Pain (1 - 3)  aluminum hydroxide/magnesium hydroxide/simethicone Suspension 30 milliLiter(s) Oral every 4 hours PRN Dyspepsia  ondansetron Injectable 4 milliGRAM(s) IV Push every 6 hours PRN Nausea and/or Vomiting

## 2020-04-07 ENCOUNTER — TRANSCRIPTION ENCOUNTER (OUTPATIENT)
Age: 55
End: 2020-04-07

## 2020-04-07 VITALS
OXYGEN SATURATION: 98 % | HEART RATE: 108 BPM | TEMPERATURE: 96 F | DIASTOLIC BLOOD PRESSURE: 84 MMHG | SYSTOLIC BLOOD PRESSURE: 136 MMHG | RESPIRATION RATE: 18 BRPM

## 2020-04-07 LAB
BASOPHILS # BLD AUTO: 0.04 K/UL — SIGNIFICANT CHANGE UP (ref 0–0.2)
BASOPHILS NFR BLD AUTO: 0.2 % — SIGNIFICANT CHANGE UP (ref 0–2)
CRP SERPL-MCNC: 0.33 MG/DL — SIGNIFICANT CHANGE UP (ref 0–0.4)
EOSINOPHIL # BLD AUTO: 0 K/UL — SIGNIFICANT CHANGE UP (ref 0–0.5)
EOSINOPHIL NFR BLD AUTO: 0 % — SIGNIFICANT CHANGE UP (ref 0–6)
HBA1C BLD-MCNC: 6.1 % — HIGH (ref 4–5.6)
HCT VFR BLD CALC: 46.6 % — SIGNIFICANT CHANGE UP (ref 39–50)
HGB BLD-MCNC: 16.3 G/DL — SIGNIFICANT CHANGE UP (ref 13–17)
IMM GRANULOCYTES NFR BLD AUTO: 5.2 % — HIGH (ref 0–1.5)
LYMPHOCYTES # BLD AUTO: 0.72 K/UL — LOW (ref 1–3.3)
LYMPHOCYTES # BLD AUTO: 3.2 % — LOW (ref 13–44)
MCHC RBC-ENTMCNC: 29.4 PG — SIGNIFICANT CHANGE UP (ref 27–34)
MCHC RBC-ENTMCNC: 35 GM/DL — SIGNIFICANT CHANGE UP (ref 32–36)
MCV RBC AUTO: 84 FL — SIGNIFICANT CHANGE UP (ref 80–100)
MONOCYTES # BLD AUTO: 0.7 K/UL — SIGNIFICANT CHANGE UP (ref 0–0.9)
MONOCYTES NFR BLD AUTO: 3.1 % — SIGNIFICANT CHANGE UP (ref 2–14)
NEUTROPHILS # BLD AUTO: 19.72 K/UL — HIGH (ref 1.8–7.4)
NEUTROPHILS NFR BLD AUTO: 88.3 % — HIGH (ref 43–77)
PLATELET # BLD AUTO: 345 K/UL — SIGNIFICANT CHANGE UP (ref 150–400)
RBC # BLD: 5.55 M/UL — SIGNIFICANT CHANGE UP (ref 4.2–5.8)
RBC # FLD: 13.6 % — SIGNIFICANT CHANGE UP (ref 10.3–14.5)
WBC # BLD: 22.35 K/UL — HIGH (ref 3.8–10.5)
WBC # FLD AUTO: 22.35 K/UL — HIGH (ref 3.8–10.5)

## 2020-04-07 PROCEDURE — 99232 SBSQ HOSP IP/OBS MODERATE 35: CPT

## 2020-04-07 RX ORDER — ALBUTEROL 90 UG/1
2 AEROSOL, METERED ORAL
Qty: 0 | Refills: 0 | DISCHARGE

## 2020-04-07 RX ORDER — PANTOPRAZOLE SODIUM 20 MG/1
1 TABLET, DELAYED RELEASE ORAL
Qty: 10 | Refills: 0
Start: 2020-04-07 | End: 2020-04-16

## 2020-04-07 RX ORDER — ALBUTEROL 90 UG/1
2 AEROSOL, METERED ORAL
Qty: 1 | Refills: 0
Start: 2020-04-07 | End: 2020-05-06

## 2020-04-07 RX ORDER — MONTELUKAST 4 MG/1
1 TABLET, CHEWABLE ORAL
Qty: 30 | Refills: 0
Start: 2020-04-07 | End: 2020-05-06

## 2020-04-07 RX ADMIN — Medication 40 MILLIGRAM(S): at 17:10

## 2020-04-07 RX ADMIN — ALBUTEROL 2 PUFF(S): 90 AEROSOL, METERED ORAL at 05:11

## 2020-04-07 RX ADMIN — MONTELUKAST 10 MILLIGRAM(S): 4 TABLET, CHEWABLE ORAL at 20:39

## 2020-04-07 RX ADMIN — Medication 1000 MILLIGRAM(S): at 20:39

## 2020-04-07 RX ADMIN — PANTOPRAZOLE SODIUM 40 MILLIGRAM(S): 20 TABLET, DELAYED RELEASE ORAL at 05:11

## 2020-04-07 RX ADMIN — Medication 600 MILLIGRAM(S): at 05:10

## 2020-04-07 RX ADMIN — Medication 600 MILLIGRAM(S): at 17:10

## 2020-04-07 RX ADMIN — ENOXAPARIN SODIUM 40 MILLIGRAM(S): 100 INJECTION SUBCUTANEOUS at 11:56

## 2020-04-07 RX ADMIN — ALBUTEROL 2 PUFF(S): 90 AEROSOL, METERED ORAL at 11:56

## 2020-04-07 RX ADMIN — Medication 20 MILLIGRAM(S): at 05:11

## 2020-04-07 RX ADMIN — Medication 40 MILLIGRAM(S): at 05:10

## 2020-04-07 RX ADMIN — ALBUTEROL 2 PUFF(S): 90 AEROSOL, METERED ORAL at 20:39

## 2020-04-07 RX ADMIN — ALBUTEROL 2 PUFF(S): 90 AEROSOL, METERED ORAL at 16:34

## 2020-04-07 RX ADMIN — AMLODIPINE BESYLATE 10 MILLIGRAM(S): 2.5 TABLET ORAL at 05:10

## 2020-04-07 RX ADMIN — SIMVASTATIN 10 MILLIGRAM(S): 20 TABLET, FILM COATED ORAL at 20:39

## 2020-04-07 RX ADMIN — ALBUTEROL 2 PUFF(S): 90 AEROSOL, METERED ORAL at 08:28

## 2020-04-07 RX ADMIN — ALBUTEROL 2 PUFF(S): 90 AEROSOL, METERED ORAL at 01:39

## 2020-04-07 NOTE — DISCHARGE NOTE PROVIDER - CARE PROVIDER_API CALL
Co, Jacob GURROLA)  Internal Medicine  [address] [place] [address]  Phone: [phone]  Fax: [phone]  Scheduled Appointment: 04/10/2020 03:30 PM

## 2020-04-07 NOTE — DISCHARGE NOTE NURSING/CASE MANAGEMENT/SOCIAL WORK - NSDCFUADDAPPT_GEN_ALL_CORE_FT
[facility]  [address], [facility] [id number]  APPT SCHEDULED FOR 4/9 @ 330PM WITH DR. MIRANDA

## 2020-04-07 NOTE — DISCHARGE NOTE PROVIDER - HOSPITAL COURSE
54y old Male with PMHx of HTN, HLD, and Asthma who presents 3/30/20 with a chief complaint of shortness of breath, pleuritic chest pain, and subjective fevers for the last 3 days.  Pt notes he started having increased wheezing and SOB on Saturday.  He describes electric shocks in his chest when taking deep breaths.  .  In the ER, patient found to have hypoxia-- 92% on 4L now 95% on NRB mask.  WBC 21 with 2% lymph, elevated AST/ ALT.  QTc 414.  CXR with bilateral infiltrates.  COVID +.   PCP: Dr Martins @ [facility] course     3/31:  Patient seen and examined at bedside earlier today.  Feeling slightly better today.  Less SOB.  Tearful.  [place]  phone used to communicate with patient.      4/1:  Pt seen.  Oxygen titrated down.  Now on 5L.  Feeling better.  3    4/2:  Pt seen.  Feeling better overall since admission.  C/o some LUQ pain.  Some burning sensation.  D/w patient via [place]  [id number].    4/3: feeling better; less sob; decrease O2 to 3 L nc    4/4: no complaints; on nc at 3 L    4/5: pt stable on 3 L O2 nc; decrease to 2 L today; remains afebrile    4/6: pt 94% on 3L, down to 91% RA. c/o chest pain, cough productive , dyspnea on exertion denies abdominal pain, urinary problems  [place]  Luisa #[id number] 54y old Male with PMHx of HTN, HLD, and Asthma who presents 3/30/20 with a chief complaint of shortness of breath, pleuritic chest pain, and subjective fevers for the last 3 days.  Pt notes he started having increased wheezing and SOB on Saturday.  He describes electric shocks in his chest when taking deep breaths.  .  In the ER, patient found to have hypoxia-- 92% on 4L now 95% on NRB mask.  WBC 21 with 2% lymph, elevated AST/ ALT.  QTc 414.  CXR with bilateral infiltrates.  COVID +.   PCP: Dr Martins @ [facility] course     3/31:  Patient seen and examined at bedside earlier today.  Feeling slightly better today.  Less SOB.  Tearful.  [place]  phone used to communicate with patient.      4/1:  Pt seen.  Oxygen titrated down.  Now on 5L.  Feeling better.  3    4/2:  Pt seen.  Feeling better overall since admission.  C/o some LUQ pain.  Some burning sensation.  D/w patient via [place]  [id number].    4/3: feeling better; less sob; decrease O2 to 3 L nc    4/4: no complaints; on nc at 3 L    4/5: pt stable on 3 L O2 nc; decrease to 2 L today; remains afebrile    4/6: pt 94% on 3L, down to 91% RA. c/o chest pain, cough productive , dyspnea on exertion denies abdominal pain, urinary problems  [place]  Luisa #[id number]    4/7: pt seen and examined. Comfortable on 3L NC, SaO2 95%. T(C): 36.1 (04-06-20 @ 17:30), Max: 36.2 (04-05-20 @ 20:54)    T(F): 96.9 (04-06-20 @ 17:30), Max: 97.1 (04-05-20 @ 20:54)    HR: 98 (04-06-20 @ 17:30) (77 - 98)    BP: 143/85 (04-06-20 @ 17:30) (127/90 - 143/85)    RR: 18 (04-06-20 @ 17:30) (18 - 18)    SpO2: 96% (04-06-20 @ 17:30) (88% - 96%)    Wt(kg): --        PHYSICAL EXAM:    GENERAL: NAD    NERVOUS SYSTEM:  Alert & Oriented X3, non- focal exam    HEAD:  Atraumatic, Normocephalic    EYES: EOMI, PERRLA, conjunctiva and sclera clear    HEENT: Moist mucous membranes    NECK: Supple, No JVD    CHEST/LUNG: Clear to auscultation bilaterally; diminished breath shouds bilaterally. No rales, no rhonchi, no wheezing, or rubs    HEART: Regular rate and rhythm; No murmurs, rubs, or gallops    ABDOMEN: Soft, Nontender, Nondistended; Bowel sounds present    GENITOURINARY- Voiding, no suprapubic tenderness    EXTREMITIES:  2+ Peripheral Pulses, No clubbing, cyanosis, or edema    MUSCULOSKELETAL:- No muscle tenderness, Muscle tone normal, No joint tenderness, no Joint swelling, Joint range of motion-normal    SKIN-no rash, no lesionAssessment and Plan:     · Assessment        54y old Male with PMHx of HTN, HLD, and Asthma who presents with a chief complaint of shortness of breath, pleuritic chest pain, and subjective fevers.  COVID PNA.          Acute Hypoxic Respiratory Failure secondary to COVID-19 viral syndrome with superimposed PNA with underlying Asthma exacebration    Oxygen titrated down from NRB to 3L.  Overall improved.      Patient has acute diagnosis of COVID- 19  and requires home O2 to increase hospital capacity and mitigate risk.     d/rama Rocephin/ Azithro, s/p  plaquenil COVID protocol     Cont IV steroids--> taper down will need po taper , add albuterol, mucinex    Code status d/w patient and family-- FULL CODE.  Would want intubation.       - restart cingular , restart advair upon d/c     CRP trending down:  10 to 3 to 0.5      * Leukocytosis , steroids induced  - repeat cbc in am    * Hyperglycemia steroids induced - check A1C     * HTN - Hold ACE/ HCTZ.  Cont CCB.  BP stable.  lasix 20 in am for BP control    * HLD - cont statin.      * Mild transaminitis, improved  - Hepatitis panel negative.    Suspect related to COVID.      * GERD/ LUQ pain:  Start protonix and follow.      Code Status:  FULL CODE.      DVT proph:  Lovenox. 54y old Male with PMHx of HTN, HLD, and Asthma who presents 3/30/20 with a chief complaint of shortness of breath, pleuritic chest pain, and subjective fevers for the last 3 days.  Pt notes he started having increased wheezing and SOB on Saturday.  He describes electric shocks in his chest when taking deep breaths.  .  In the ER, patient found to have hypoxia-- 92% on 4L now 95% on NRB mask.  WBC 21 with 2% lymph, elevated AST/ ALT.  QTc 414.  CXR with bilateral infiltrates.  COVID +.   PCP: Dr Martins @ [facility] course     3/31:  Patient seen and examined at bedside earlier today.  Feeling slightly better today.  Less SOB.  Tearful.  [place]  phone used to communicate with patient.      4/1:  Pt seen.  Oxygen titrated down.  Now on 5L.  Feeling better.  3    4/2:  Pt seen.  Feeling better overall since admission.  C/o some LUQ pain.  Some burning sensation.  D/w patient via [employer]  [id number].    4/3: feeling better; less sob; decrease O2 to 3 L nc    4/4: no complaints; on nc at 3 L    4/5: pt stable on 3 L O2 nc; decrease to 2 L today; remains afebrile    4/6: pt 94% on 3L, down to 91% RA. c/o chest pain, cough productive , dyspnea on exertion denies abdominal pain, urinary problems  [place]  Luisa #[id number]    4/7: pt seen and examined. Comfortable on 3L NC        SaO2 95%. T(C): 36.1 (04-06-20 @ 17:30), Max: 36.2 (04-05-20 @ 20:54)    T(F): 96.9 (04-06-20 @ 17:30), Max: 97.1 (04-05-20 @ 20:54)    HR: 98 (04-06-20 @ 17:30) (77 - 98)    BP: 143/85 (04-06-20 @ 17:30) (127/90 - 143/85)    RR: 18 (04-06-20 @ 17:30) (18 - 18)    SpO2: 96% (04-06-20 @ 17:30) (88% - 96%)    Wt(kg): --        PHYSICAL EXAM:    GENERAL: NAD    NERVOUS SYSTEM:  Alert & Oriented X3, non- focal exam    HEAD:  Atraumatic, Normocephalic    EYES: EOMI, PERRLA, conjunctiva and sclera clear    HEENT: Moist mucous membranes    NECK: Supple, No JVD    CHEST/LUNG: diminished breath shouds bilaterally. No rales, no rhonchi, no wheezing, or rubs    HEART: Regular rate and rhythm; No murmurs, rubs, or gallops    ABDOMEN: Soft, Nontender, Nondistended; Bowel sounds present    GENITOURINARY- Voiding, no suprapubic tenderness    EXTREMITIES:  2+ Peripheral Pulses, No clubbing, cyanosis, or edema    MUSCULOSKELETAL:- No muscle tenderness, Muscle tone normal, No joint tenderness, no Joint swelling, Joint range of motion-normal    SKIN-no rash, no lesionAssessment and Plan:     · Assessment    54y old Male with PMHx of HTN, HLD, and Asthma who presents with a chief complaint of shortness of breath, pleuritic chest pain, and subjective fevers.  COVID PNA.      Acute Hypoxic Respiratory Failure secondary to COVID-19 viral syndrome with superimposed PNA with underlying Asthma exacebration    Oxygen titrated down from NRB to 3L.  Overall improved.      Patient has acute diagnosis of COVID- 19  and requires home O2 to increase hospital capacity and mitigate risk.     d/rama Rocephin/ Azithro, s/p  plaquenil COVID protocol     Cont IV steroids--> taper down will need po taper , add albuterol, mucinex    Code status d/w patient and family-- FULL CODE.  Would want intubation.       - restart cingular , restart advair upon d/c     CRP trending down:  10 to 3 to 0.5      * Leukocytosis , steroids induced  - repeat cbc in am    * Hyperglycemia steroids induced - check A1C  6.1    * HTN - Hold ACE/ HCTZ.  Cont CCB.  BP stable.  lasix 20 in am for BP control    * HLD - cont statin.      * Mild transaminitis, improved  - Hepatitis panel negative.    Suspect related to COVID.      * GERD/ LUQ pain:  Start protonix and follow.      Code Status:  FULL CODE.      DVT proph:  Lovenox.      Disposition - medically optimized to be discharged home with close follow up with PCP    complete steroid taper     return to ED if fever, abdominal pain, nausea, vomiting, chest pain, dyspnea    Discharge plan discussed with patient, RN    Patient advised to follow up with PCP within 3-7 days    time spend 40 min    Discharge note faxed to PCP with my contact information to call me back     PCP Dr. Martins 54y old Male with PMHx of HTN, HLD, and Asthma who presents 3/30/20 with a chief complaint of shortness of breath, pleuritic chest pain, and subjective fevers for the last 3 days.  Pt notes he started having increased wheezing and SOB on Saturday.  He describes electric shocks in his chest when taking deep breaths.  .  In the ER, patient found to have hypoxia-- 92% on 4L now 95% on NRB mask.  WBC 21 with 2% lymph, elevated AST/ ALT.  QTc 414.  CXR with bilateral infiltrates.  COVID +.   PCP: Dr Martins @ [facility] course     3/31:  Patient seen and examined at bedside earlier today.  Feeling slightly better today.  Less SOB.  Tearful.  [place]  phone used to communicate with patient.      4/1:  Pt seen.  Oxygen titrated down.  Now on 5L.  Feeling better.  3    4/2:  Pt seen.  Feeling better overall since admission.  C/o some LUQ pain.  Some burning sensation.  D/w patient via [place]  [id number].    4/3: feeling better; less sob; decrease O2 to 3 L nc    4/4: no complaints; on nc at 3 L    4/5: pt stable on 3 L O2 nc; decrease to 2 L today; remains afebrile    4/6: pt 94% on 3L, down to 91% RA. c/o chest pain, cough productive , dyspnea on exertion denies abdominal pain, urinary problems  [place]  Luisa #[id number]    4/7: pt seen and examined. Comfortable on 3L NC    4/8 - pt seen and examined using , see progress note, + lower abdominal cramping     T(C): 35.7 (04-07-20 @ 20:24), Max: 35.7 (04-07-20 @ 20:24)    T(F): 96.3 (04-07-20 @ 20:24), Max: 96.3 (04-07-20 @ 20:24)    HR: 108 (04-07-20 @ 20:24) (94 - 108)    BP: 136/84 (04-07-20 @ 20:24) (136/84 - 136/84)    RR: 18 (04-07-20 @ 20:24) (18 - 18)    SpO2: 98% (04-07-20 @ 20:24) (96% - 98%)    Wt(kg): --        PHYSICAL EXAM:    GENERAL: NAD    NERVOUS SYSTEM:  Alert & Oriented X3, non- focal exam    HEAD:  Atraumatic, Normocephalic    EYES: EOMI, PERRLA, conjunctiva and sclera clear    HEENT: Moist mucous membranes    NECK: Supple, No JVD    CHEST/LUNG: diminished breath shouds bilaterally. No rales, no rhonchi, no wheezing, or rubs    HEART: Regular rate and rhythm; No murmurs, rubs, or gallops    ABDOMEN: Soft, Nontender, Nondistended; Bowel sounds present    GENITOURINARY- Voiding, no suprapubic tenderness    EXTREMITIES:  2+ Peripheral Pulses, No clubbing, cyanosis, or edema    MUSCULOSKELETAL:- No muscle tenderness, Muscle tone normal, No joint tenderness, no Joint swelling, Joint range of motion-normal    SKIN-no rash, no lesionAssessment and Plan:     · Assessment    54y old Male with PMHx of HTN, HLD, and Asthma who presents with a chief complaint of shortness of breath, pleuritic chest pain, and subjective fevers.  COVID PNA.      Acute Hypoxic Respiratory Failure secondary to COVID-19 viral syndrome with superimposed PNA with underlying Asthma exacebration    Oxygen titrated down from NRB to 3L.  Overall improved.      Patient has acute diagnosis of COVID- 19  and requires home O2 to increase hospital capacity and mitigate risk.     d/rama Rocephin/ Azithro, s/p  plaquenil COVID protocol     Cont IV steroids--> taper down will need po taper , add albuterol, mucinex    Code status d/w patient and family-- FULL CODE.  Would want intubation.       - restart cingular , restart advair upon d/c     CRP trending down:  10 to 3 to 0.5      * Leukocytosis , steroids induced  - repeat cbc in am    * Hyperglycemia steroids induced - check A1C  6.1    * HTN - Hold ACE/ HCTZ.  Cont CCB.  BP stable.  lasix 20 in am for BP control    * HLD - cont statin.      * Mild transaminitis, improved  - Hepatitis panel negative.    Suspect related to COVID.      * GERD/ LUQ pain:  Start protonix and follow.  add probioitcs     Code Status:  FULL CODE.      DVT proph:  Lovenox.      Disposition - medically optimized to be discharged home with close follow up with PCP    complete steroid taper     return to ED if fever, abdominal pain, nausea, vomiting, chest pain, dyspnea    Discharge plan discussed with patient, RN    Patient advised to follow up with PCP within 3-7 days    time spend 40 min    Discharge note faxed to PCP with my contact information to call me back     PCP Dr. Martins

## 2020-04-07 NOTE — DISCHARGE NOTE PROVIDER - NSDCFUADDINST_GEN_ALL_CORE_FT
Heart/Mediastinum/Lungs/Other: The heart size is normal.The lungs demonstrate patchy bilateral airspace disease.No significant interval change. There are no pleural effusions.    Impression:    As above.    < end of copied text >    Comparison: 1/12/2018.    Findings:  The heart and mediastinal contours are exaggerated by the AP lordotic technique.. RIGHT base and LEFT perihilar consolidation is observed. No sizable effusions or pneumothorax..        IMPRESSION:    1.  Multifocal pneumonia    < end of copied text >

## 2020-04-07 NOTE — DISCHARGE NOTE NURSING/CASE MANAGEMENT/SOCIAL WORK - PATIENT PORTAL LINK FT
You can access the FollowMyHealth Patient Portal offered by [facility] by registering at the following website: [email or web address]. By joining [facility]’s FollowMyHealth portal, you will also be able to view your health information using other applications (apps) compatible with our system.

## 2020-04-07 NOTE — DISCHARGE NOTE PROVIDER - NSDCMRMEDTOKEN_GEN_ALL_CORE_FT
albuterol 90 mcg/inh inhalation aerosol: 2 puff(s) inhaled every 6 hours  ***entered from Phase Eight***  amLODIPine 10 mg oral tablet: 1 tab(s) orally once a day  ***entered from Phase Eight***  losartan-hydrochlorothiazide 100 mg-25 mg oral tablet: 1 tab(s) orally once a day  ***entered from Phase Eight***  simvastatin 10 mg oral tablet: 1 tab(s) orally once a day (at bedtime)  ***entered from Phase Eight*** albuterol 90 mcg/inh inhalation aerosol: 2 puff(s) inhaled every 6 hours  ***entered from [facility]***  amLODIPine 10 mg oral tablet: 1 tab(s) orally once a day  ***entered from [facility]***  losartan-hydrochlorothiazide 100 mg-25 mg oral tablet: 1 tab(s) orally once a day  ***entered from [employer]***  simvastatin 10 mg oral tablet: 1 tab(s) orally once a day (at bedtime)  ***entered from [employer]*** amLODIPine 10 mg oral tablet: 1 tab(s) orally once a day  ***entered from [employer]***  guaiFENesin 600 mg oral tablet, extended release: 1 tab(s) orally every 12 hours  losartan-hydrochlorothiazide 100 mg-25 mg oral tablet: 1 tab(s) orally once a day  ***entered from [employer]***  montelukast 10 mg oral tablet: 1 tab(s) orally once a day (at bedtime)  pantoprazole 40 mg oral delayed release tablet: 1 tab(s) orally once a day (before a meal)  predniSONE 10 mg oral tablet: 4 tab(s) orally once a day x 2 days  than 3 tabs daily for 2 days  than 2 tabs daily for 2 days  than 1 tab daily for 2 day  ProAir HFA 90 mcg/inh inhalation aerosol: 2 puff(s) inhaled every 4 hours, As Needed -for bronchospasm   simvastatin 10 mg oral tablet: 1 tab(s) orally once a day (at bedtime)  ***entered from [employer]*** amLODIPine 10 mg oral tablet: 1 tab(s) orally once a day  ***entered from [employer]***  guaiFENesin 600 mg oral tablet, extended release: 1 tab(s) orally every 12 hours  lactobacillus acidophilus oral capsule: 1 cap(s) orally 2 times a day   losartan-hydrochlorothiazide 100 mg-25 mg oral tablet: 1 tab(s) orally once a day  ***entered from [employer]***  montelukast 10 mg oral tablet: 1 tab(s) orally once a day (at bedtime)  pantoprazole 40 mg oral delayed release tablet: 1 tab(s) orally once a day (before a meal)  predniSONE 10 mg oral tablet: 4 tab(s) orally once a day x 2 days  than 3 tabs daily for 2 days  than 2 tabs daily for 2 days  than 1 tab daily for 2 day  ProAir HFA 90 mcg/inh inhalation aerosol: 2 puff(s) inhaled every 4 hours, As Needed -for bronchospasm   simvastatin 10 mg oral tablet: 1 tab(s) orally once a day (at bedtime)  ***entered from [employer]***

## 2020-04-07 NOTE — DISCHARGE NOTE PROVIDER - NSDCFUADDAPPT_GEN_ALL_CORE_FT
[facility]  [address], [facility] [address]  APPT SCHEDULED FOR 4/9 @ 330PM WITH DR. MIRANDA

## 2020-04-07 NOTE — DISCHARGE NOTE PROVIDER - NSDCCPCAREPLAN_GEN_ALL_CORE_FT
PRINCIPAL DISCHARGE DIAGNOSIS  Diagnosis: Pneumonia  Assessment and Plan of Treatment:       SECONDARY DISCHARGE DIAGNOSES  Diagnosis: Hypoxia  Assessment and Plan of Treatment: PRINCIPAL DISCHARGE DIAGNOSIS  Diagnosis: Pneumonia due to COVID-19 virus  Assessment and Plan of Treatment: It has been determined that you no longer need hospitalization and can recover while remaining in self-quarantine at home for 14 days. You should restrict activities outside your home except for getting medical care.  Do not go to work, school or public areas.  Avoid using public transportation.  Separate yourself from other people and animals in your home.  Cover your coughs and sneezes.  Clean your hands often. Avoid sharing personal household items. Clean all high touch surfaces. Monitor your symptoms, if you have a medical emergency call 911.  follow up with DR. Martins within 1 week , appointment made on Friday 4/10/20 at 15:30        SECONDARY DISCHARGE DIAGNOSES  Diagnosis: Prediabetes  Assessment and Plan of Treatment: A1C 6.1, follow up with PCP within 1 week , eat low surgar diet    Diagnosis: HTN (hypertension)  Assessment and Plan of Treatment: restart homemeds    Diagnosis: Asthma exacerbation  Assessment and Plan of Treatment: complete steroid taper, follow up with Dr. Martins within 1week  wear  oxygen until PCP will tell you stop to wear

## 2020-04-08 LAB
CRP SERPL-MCNC: 0.25 MG/DL — SIGNIFICANT CHANGE UP (ref 0–0.4)
D DIMER BLD IA.RAPID-MCNC: 169 NG/ML DDU — SIGNIFICANT CHANGE UP
FERRITIN SERPL-MCNC: 990 NG/ML — HIGH (ref 30–400)
LDH SERPL L TO P-CCNC: 297 U/L — HIGH (ref 84–241)
PROCALCITONIN SERPL-MCNC: <0.02 NG/ML — SIGNIFICANT CHANGE UP (ref 0.02–0.1)

## 2020-04-08 PROCEDURE — 99239 HOSP IP/OBS DSCHRG MGMT >30: CPT

## 2020-04-08 PROCEDURE — 93010 ELECTROCARDIOGRAM REPORT: CPT

## 2020-04-08 RX ORDER — LACTOBACILLUS ACIDOPHILUS 100MM CELL
1 CAPSULE ORAL
Refills: 0 | Status: DISCONTINUED | OUTPATIENT
Start: 2020-04-08 | End: 2020-04-08

## 2020-04-08 RX ORDER — LACTOBACILLUS ACIDOPHILUS 100MM CELL
1 CAPSULE ORAL
Qty: 28 | Refills: 0
Start: 2020-04-08 | End: 2020-04-21

## 2020-04-08 RX ADMIN — PANTOPRAZOLE SODIUM 40 MILLIGRAM(S): 20 TABLET, DELAYED RELEASE ORAL at 04:43

## 2020-04-08 RX ADMIN — AMLODIPINE BESYLATE 10 MILLIGRAM(S): 2.5 TABLET ORAL at 04:42

## 2020-04-08 RX ADMIN — Medication 40 MILLIGRAM(S): at 04:47

## 2020-04-08 RX ADMIN — ALBUTEROL 2 PUFF(S): 90 AEROSOL, METERED ORAL at 09:10

## 2020-04-08 RX ADMIN — ENOXAPARIN SODIUM 40 MILLIGRAM(S): 100 INJECTION SUBCUTANEOUS at 12:31

## 2020-04-08 RX ADMIN — ALBUTEROL 2 PUFF(S): 90 AEROSOL, METERED ORAL at 12:31

## 2020-04-08 RX ADMIN — Medication 1 TABLET(S): at 12:31

## 2020-04-08 RX ADMIN — ALBUTEROL 2 PUFF(S): 90 AEROSOL, METERED ORAL at 00:00

## 2020-04-08 RX ADMIN — ALBUTEROL 2 PUFF(S): 90 AEROSOL, METERED ORAL at 05:04

## 2020-04-08 RX ADMIN — Medication 600 MILLIGRAM(S): at 04:42

## 2020-04-08 NOTE — PROGRESS NOTE ADULT - ASSESSMENT
54y old Male with PMHx of HTN, HLD, and Asthma who presents with a chief complaint of shortness of breath, pleuritic chest pain, and subjective fevers.  COVID PNA.      Acute Hypoxic Respiratory Failure secondary to COVID-19 viral syndrome with superimposed PNA with underlying Asthma exacebration  Oxygen titrated down from NRB to 3L.  Overall improved.   Patient has acute diagnosis of COVID- 19  and requires home O2 to increase hospital capacity and mitigate risk.   d/rama Rocephin/ Azithro, s/p  plaquenil COVID protocol   Cont IV steroids--> taper down will need po taper , add albuterol, mucinex  Code status d/w patient and family-- FULL CODE.  Would want intubation.     - restart cingular , restart advair upon d/c   CRP trended down:  10 to 3 to 0.5    * Leukocytosis , steroids induced  - repeat cbc in am  * Hyperglycemia steroids induced - check A1C   * HTN - Hold ACE/ HCTZ.  Cont CCB.  BP stable.  lasix 20 in am for BP control  * HLD - cont statin.    * Mild transaminitis, improved  - Hepatitis panel negative.  Suspect related to COVID.    * GERD/ LUQ pain:  Start protonix and follow.    * ABD Pain: probiotics  Code Status:  FULL CODE.    DVT proph:  Lovenox.      Dispo - plan for discharge , awaiting home O2 54y old Male with PMHx of HTN, HLD, and Asthma who presents with a chief complaint of shortness of breath, pleuritic chest pain, and subjective fevers.  COVID PNA.      Acute Hypoxic Respiratory Failure secondary to COVID-19 viral syndrome with superimposed PNA with underlying Asthma exacebration  Oxygen titrated down from NRB to 3L.  Overall improved.   Patient has acute diagnosis of COVID- 19  and requires home O2 to increase hospital capacity and mitigate risk.   d/rama Rocephin/ Azithro, s/p  plaquenil COVID protocol   Cont IV steroids--> taper down will need po taper , add albuterol, mucinex  Code status d/w patient and family-- FULL CODE.  Would want intubation.     - restart cingular , restart advair upon d/c   CRP trended down:  10 to 3 to 0.5    * Leukocytosis , steroids induced  - repeat cbc in am  * Hyperglycemia steroids induced - check A1C   * HTN - Hold ACE/ HCTZ.  Cont CCB.  BP stable.  lasix 20 in am for BP control  * HLD - cont statin.    * Mild transaminitis, improved  - Hepatitis panel negative.  Suspect related to COVID.    * GERD/ LUQ pain:  Start protonix and follow.   add probiotics, maalox prn   * ABD Pain: probiotics  Code Status:  FULL CODE.    DVT proph:  Lovenox.      Dispo - plan for discharge , awaiting home O2

## 2020-04-08 NOTE — PROGRESS NOTE ADULT - SUBJECTIVE AND OBJECTIVE BOX
cc - dyspnea, cough , chest tightness      54y old Male with PMHx of HTN, HLD, and Asthma who presents 3/30/20 with a chief complaint of shortness of breath, pleuritic chest pain, and subjective fevers for the last 3 days.  Pt notes he started having increased wheezing and SOB on Saturday.  He describes electric shocks in his chest when taking deep breaths.  .  In the ER, patient found to have hypoxia-- 92% on 4L now 95% on NRB mask.  WBC 21 with 2% lymph, elevated AST/ ALT.  QTc 414.  CXR with bilateral infiltrates.  COVID +.   PCP: Dr Martins @ [facility] course   3/31:  Patient seen and examined at bedside earlier today.  Feeling slightly better today.  Less SOB.  Tearful.  [place]  phone used to communicate with patient.    4/1:  Pt seen.  Oxygen titrated down.  Now on 5L.  Feeling better.  3  4/2:  Pt seen.  Feeling better overall since admission.  C/o some LUQ pain.  Some burning sensation.  D/w patient via [place]  [id number].  4/3: feeling better; less sob; decrease O2 to 3 L nc  4/4: no complaints; on nc at 3 L  4/5: pt stable on 3 L O2 nc; decrease to 2 L today; remains afebrile  4/6: pt 94% on 3L, down to 91% RA. c/o chest pain, cough productive , dyspnea on exertion denies abdominal pain, urinary problems  [employer]  Luisa #[id number]  4/7: Pt seen and examined at bedside. Doing well, cough no dyspnea.   4/8: pt seen and examined at bedside. Doing well. reports vague mild abd pain. Voiding well. Bowel function normal. no nausea, no vomiting. Upper sorbian Translation by [employer]  #[id number]    Review of system- Rest of the review of system are negative except mentioned in HPI    Vital Signs Last 24 Hrs  T(C): 35.7 (07 Apr 2020 20:24), Max: 36.9 (07 Apr 2020 11:52)  T(F): 96.3 (07 Apr 2020 20:24), Max: 98.4 (07 Apr 2020 11:52)  HR: 108 (07 Apr 2020 20:24) (63 - 108)  BP: 136/84 (07 Apr 2020 20:24) (136/84 - 146/81)  BP(mean): --  RR: 18 (07 Apr 2020 20:24) (18 - 18)  SpO2: 98% (07 Apr 2020 20:24) (94% - 98%)    PHYSICAL EXAM:  GENERAL: NAD  NERVOUS SYSTEM:  Alert & Oriented X3, non- focal exam  HEAD:  Atraumatic, Normocephalic  EYES: EOMI, PERRLA, conjunctiva and sclera clear  HEENT: Moist mucous membranes  NECK: Supple, No JVD  CHEST/LUNG: Clear to auscultation bilaterally; diminished breath sounds bilaterally but improved. No rales, no rhonchi, no wheezing, or rubs  HEART: Regular rate and rhythm; No murmurs, rubs, or gallops  ABDOMEN: Soft, Nontender, Nondistended; Bowel sounds present  GENITOURINARY- Voiding, no suprapubic tenderness  EXTREMITIES:  2+ Peripheral Pulses, No clubbing, cyanosis, or edema  MUSCULOSKELETAL:- No muscle tenderness, Muscle tone normal, No joint tenderness, no Joint swelling, Joint range of motion-normal  SKIN-no rash, no lesion                          16.3   22.35 )-----------( 345      ( 07 Apr 2020 06:59 )             46.6     MEDICATIONS  (STANDING):  ALBUTerol    90 MICROgram(s) HFA Inhaler 2 Puff(s) Inhalation every 4 hours  amLODIPine   Tablet 10 milliGRAM(s) Oral daily  enoxaparin Injectable 40 milliGRAM(s) SubCutaneous daily  guaiFENesin  milliGRAM(s) Oral every 12 hours  methylPREDNISolone sodium succinate Injectable 40 milliGRAM(s) IV Push every 12 hours  montelukast 10 milliGRAM(s) Oral at bedtime  pantoprazole    Tablet 40 milliGRAM(s) Oral before breakfast  simvastatin 10 milliGRAM(s) Oral at bedtime    MEDICATIONS  (PRN):  acetaminophen   Tablet .. 1000 milliGRAM(s) Oral every 6 hours PRN Mild Pain (1 - 3)  aluminum hydroxide/magnesium hydroxide/simethicone Suspension 30 milliLiter(s) Oral every 4 hours PRN Dyspepsia  ondansetron Injectable 4 milliGRAM(s) IV Push every 6 hours PRN Nausea and/or Vomiting

## 2020-04-14 DIAGNOSIS — J96.01 ACUTE RESPIRATORY FAILURE WITH HYPOXIA: ICD-10-CM

## 2020-04-14 DIAGNOSIS — J45.901 UNSPECIFIED ASTHMA WITH (ACUTE) EXACERBATION: ICD-10-CM

## 2020-04-14 DIAGNOSIS — R73.9 HYPERGLYCEMIA, UNSPECIFIED: ICD-10-CM

## 2020-04-14 DIAGNOSIS — U07.1 COVID-19: ICD-10-CM

## 2020-04-14 DIAGNOSIS — J12.89 OTHER VIRAL PNEUMONIA: ICD-10-CM

## 2020-04-14 DIAGNOSIS — K21.9 GASTRO-ESOPHAGEAL REFLUX DISEASE WITHOUT ESOPHAGITIS: ICD-10-CM

## 2020-04-14 DIAGNOSIS — I10 ESSENTIAL (PRIMARY) HYPERTENSION: ICD-10-CM

## 2020-04-14 DIAGNOSIS — E78.5 HYPERLIPIDEMIA, UNSPECIFIED: ICD-10-CM

## 2020-05-14 PROBLEM — E78.00 PURE HYPERCHOLESTEROLEMIA, UNSPECIFIED: Chronic | Status: ACTIVE | Noted: 2020-03-30

## 2020-05-14 PROBLEM — I10 ESSENTIAL (PRIMARY) HYPERTENSION: Chronic | Status: ACTIVE | Noted: 2020-03-30

## 2020-05-14 PROBLEM — J45.909 UNSPECIFIED ASTHMA, UNCOMPLICATED: Chronic | Status: ACTIVE | Noted: 2020-03-30

## 2020-05-18 ENCOUNTER — APPOINTMENT (OUTPATIENT)
Dept: CT IMAGING | Facility: CLINIC | Age: 55
End: 2020-05-18
Payer: MEDICAID

## 2020-05-18 ENCOUNTER — OUTPATIENT (OUTPATIENT)
Dept: OUTPATIENT SERVICES | Facility: HOSPITAL | Age: 55
LOS: 1 days | End: 2020-05-18
Payer: MEDICAID

## 2020-05-18 DIAGNOSIS — U07.1 COVID-19: ICD-10-CM

## 2020-05-18 DIAGNOSIS — R09.89 OTHER SPECIFIED SYMPTOMS AND SIGNS INVOLVING THE CIRCULATORY AND RESPIRATORY SYSTEMS: ICD-10-CM

## 2020-05-18 PROCEDURE — 71275 CT ANGIOGRAPHY CHEST: CPT | Mod: 26

## 2020-05-18 PROCEDURE — 82565 ASSAY OF CREATININE: CPT

## 2020-05-18 PROCEDURE — 71275 CT ANGIOGRAPHY CHEST: CPT

## 2020-06-29 ENCOUNTER — APPOINTMENT (OUTPATIENT)
Dept: ULTRASOUND IMAGING | Facility: CLINIC | Age: 55
End: 2020-06-29
Payer: MEDICAID

## 2020-06-29 ENCOUNTER — OUTPATIENT (OUTPATIENT)
Dept: OUTPATIENT SERVICES | Facility: HOSPITAL | Age: 55
LOS: 1 days | End: 2020-06-29
Payer: MEDICAID

## 2020-06-29 DIAGNOSIS — R10.31 RIGHT LOWER QUADRANT PAIN: ICD-10-CM

## 2020-06-29 PROCEDURE — 76857 US EXAM PELVIC LIMITED: CPT

## 2020-06-29 PROCEDURE — 76857 US EXAM PELVIC LIMITED: CPT | Mod: 26

## 2020-07-25 ENCOUNTER — APPOINTMENT (OUTPATIENT)
Dept: ULTRASOUND IMAGING | Facility: CLINIC | Age: 55
End: 2020-07-25

## 2020-08-04 ENCOUNTER — APPOINTMENT (OUTPATIENT)
Dept: ULTRASOUND IMAGING | Facility: CLINIC | Age: 55
End: 2020-08-04

## 2021-08-31 NOTE — ED STATDOCS - CHPI ED RADIATION
Caller: Marleny Greenwood    Relationship: Self    Best call back number:[phone]    Medication needed:   Requested Prescriptions     Pending Prescriptions Disp Refills   • butalbital-aspirin-caffeine-codeine (Fiorinal/Codeine #3) -05-30 MG capsule 120 capsule 0     Sig: Take 1 capsule by mouth Every 4 (Four) Hours As Needed for Headache.       When do you need the refill by: ASAP    What additional details did the patient provide when requesting the medication: PATIENT IS OUT. PATIENT REQUESTED TO TALK TO SOMEONE ABOUT THIS MEDICATION. PLEASE CALL PATIENT AND ADVISE.    Does the patient have less than a 3 day supply:  [x] Yes  [] No    What is the patient's preferred pharmacy: [facility] [address] - [phone] [facility] [phone] FX             
PT notified Rx will be sent in   
none

## 2021-12-13 ENCOUNTER — OUTPATIENT (OUTPATIENT)
Dept: OUTPATIENT SERVICES | Facility: HOSPITAL | Age: 56
LOS: 1 days | End: 2021-12-13
Payer: MEDICAID

## 2021-12-13 ENCOUNTER — APPOINTMENT (OUTPATIENT)
Dept: RADIOLOGY | Facility: CLINIC | Age: 56
End: 2021-12-13
Payer: MEDICAID

## 2021-12-13 DIAGNOSIS — E78.5 HYPERLIPIDEMIA, UNSPECIFIED: ICD-10-CM

## 2021-12-13 DIAGNOSIS — M25.569 PAIN IN UNSPECIFIED KNEE: ICD-10-CM

## 2021-12-13 DIAGNOSIS — I10 ESSENTIAL (PRIMARY) HYPERTENSION: ICD-10-CM

## 2021-12-13 PROCEDURE — 73564 X-RAY EXAM KNEE 4 OR MORE: CPT | Mod: 26,50

## 2021-12-13 PROCEDURE — 73564 X-RAY EXAM KNEE 4 OR MORE: CPT

## 2021-12-15 ENCOUNTER — EMERGENCY (EMERGENCY)
Facility: HOSPITAL | Age: 56
LOS: 0 days | Discharge: HOME | End: 2021-12-15
Attending: EMERGENCY MEDICINE
Payer: OTHER MISCELLANEOUS

## 2021-12-15 VITALS
DIASTOLIC BLOOD PRESSURE: 77 MMHG | HEART RATE: 77 BPM | SYSTOLIC BLOOD PRESSURE: 139 MMHG | OXYGEN SATURATION: 99 % | RESPIRATION RATE: 16 BRPM | TEMPERATURE: 98 F

## 2021-12-15 VITALS — WEIGHT: 240.08 LBS | HEIGHT: 66 IN

## 2021-12-15 DIAGNOSIS — R07.89 OTHER CHEST PAIN: ICD-10-CM

## 2021-12-15 DIAGNOSIS — E78.00 PURE HYPERCHOLESTEROLEMIA, UNSPECIFIED: ICD-10-CM
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
## 2021-12-15 DIAGNOSIS — R51.9 HEADACHE, UNSPECIFIED: ICD-10-CM

## 2021-12-15 DIAGNOSIS — J11.1 INFLUENZA DUE TO UNIDENTIFIED INFLUENZA VIRUS WITH OTHER RESPIRATORY MANIFESTATIONS: ICD-10-CM

## 2021-12-15 DIAGNOSIS — E78.5 HYPERLIPIDEMIA, UNSPECIFIED: ICD-10-CM

## 2021-12-15 DIAGNOSIS — S09.90XA UNSPECIFIED INJURY OF HEAD, INITIAL ENCOUNTER: ICD-10-CM
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
## 2021-12-15 DIAGNOSIS — I10 ESSENTIAL (PRIMARY) HYPERTENSION: ICD-10-CM

## 2021-12-15 LAB
ALBUMIN SERPL ELPH-MCNC: 3.8 G/DL — SIGNIFICANT CHANGE UP (ref 3.3–5)
ALP SERPL-CCNC: 62 U/L — SIGNIFICANT CHANGE UP (ref 40–120)
ALT FLD-CCNC: 45 U/L — SIGNIFICANT CHANGE UP (ref 12–78)
ANION GAP SERPL CALC-SCNC: 5 MMOL/L — SIGNIFICANT CHANGE UP (ref 5–17)
AST SERPL-CCNC: 27 U/L — SIGNIFICANT CHANGE UP (ref 15–37)
BASOPHILS # BLD AUTO: 0.05 K/UL — SIGNIFICANT CHANGE UP (ref 0–0.2)
BASOPHILS NFR BLD AUTO: 0.5 % — SIGNIFICANT CHANGE UP (ref 0–2)
BILIRUB SERPL-MCNC: 0.6 MG/DL — SIGNIFICANT CHANGE UP (ref 0.2–1.2)
BUN SERPL-MCNC: 11 MG/DL — SIGNIFICANT CHANGE UP (ref 7–23)
CALCIUM SERPL-MCNC: 9.2 MG/DL — SIGNIFICANT CHANGE UP (ref 8.5–10.1)
CHLORIDE SERPL-SCNC: 103 MMOL/L — SIGNIFICANT CHANGE UP (ref 96–108)
CO2 SERPL-SCNC: 29 MMOL/L — SIGNIFICANT CHANGE UP (ref 22–31)
CREAT SERPL-MCNC: 0.96 MG/DL — SIGNIFICANT CHANGE UP (ref 0.5–1.3)
EOSINOPHIL # BLD AUTO: 0.07 K/UL — SIGNIFICANT CHANGE UP (ref 0–0.5)
EOSINOPHIL NFR BLD AUTO: 0.8 % — SIGNIFICANT CHANGE UP (ref 0–6)
FLUAV AG NPH QL: DETECTED
FLUBV AG NPH QL: SIGNIFICANT CHANGE UP
GLUCOSE SERPL-MCNC: 108 MG/DL — HIGH (ref 70–99)
HCT VFR BLD CALC: 45.5 % — SIGNIFICANT CHANGE UP (ref 39–50)
HGB BLD-MCNC: 15.4 G/DL — SIGNIFICANT CHANGE UP (ref 13–17)
IMM GRANULOCYTES NFR BLD AUTO: 0.3 % — SIGNIFICANT CHANGE UP (ref 0–1.5)
LYMPHOCYTES # BLD AUTO: 1.04 K/UL — SIGNIFICANT CHANGE UP (ref 1–3.3)
LYMPHOCYTES # BLD AUTO: 11.3 % — LOW (ref 13–44)
MCHC RBC-ENTMCNC: 28.7 PG — SIGNIFICANT CHANGE UP (ref 27–34)
MCHC RBC-ENTMCNC: 33.8 GM/DL — SIGNIFICANT CHANGE UP (ref 32–36)
MCV RBC AUTO: 84.9 FL — SIGNIFICANT CHANGE UP (ref 80–100)
MONOCYTES # BLD AUTO: 0.78 K/UL — SIGNIFICANT CHANGE UP (ref 0–0.9)
MONOCYTES NFR BLD AUTO: 8.5 % — SIGNIFICANT CHANGE UP (ref 2–14)
NEUTROPHILS # BLD AUTO: 7.24 K/UL — SIGNIFICANT CHANGE UP (ref 1.8–7.4)
NEUTROPHILS NFR BLD AUTO: 78.6 % — HIGH (ref 43–77)
NT-PROBNP SERPL-SCNC: 212 PG/ML — HIGH (ref 0–125)
PLATELET # BLD AUTO: 248 K/UL — SIGNIFICANT CHANGE UP (ref 150–400)
POTASSIUM SERPL-MCNC: 3.6 MMOL/L — SIGNIFICANT CHANGE UP (ref 3.5–5.3)
POTASSIUM SERPL-SCNC: 3.6 MMOL/L — SIGNIFICANT CHANGE UP (ref 3.5–5.3)
PROT SERPL-MCNC: 7.2 GM/DL — SIGNIFICANT CHANGE UP (ref 6–8.3)
RBC # BLD: 5.36 M/UL — SIGNIFICANT CHANGE UP (ref 4.2–5.8)
RBC # FLD: 13.3 % — SIGNIFICANT CHANGE UP (ref 10.3–14.5)
RSV RNA NPH QL NAA+NON-PROBE: SIGNIFICANT CHANGE UP
SARS-COV-2 RNA SPEC QL NAA+PROBE: SIGNIFICANT CHANGE UP
SODIUM SERPL-SCNC: 137 MMOL/L — SIGNIFICANT CHANGE UP (ref 135–145)
TROPONIN I, HIGH SENSITIVITY RESULT: 9.45 NG/L — SIGNIFICANT CHANGE UP
WBC # BLD: 9.21 K/UL — SIGNIFICANT CHANGE UP (ref 3.8–10.5)
WBC # FLD AUTO: 9.21 K/UL — SIGNIFICANT CHANGE UP (ref 3.8–10.5)

## 2021-12-15 PROCEDURE — 93005 ELECTROCARDIOGRAM TRACING: CPT

## 2021-12-15 PROCEDURE — 99285 EMERGENCY DEPT VISIT HI MDM: CPT

## 2021-12-15 PROCEDURE — 85025 COMPLETE CBC W/AUTO DIFF WBC: CPT

## 2021-12-15 PROCEDURE — 80053 COMPREHEN METABOLIC PANEL: CPT

## 2021-12-15 PROCEDURE — 83880 ASSAY OF NATRIURETIC PEPTIDE: CPT

## 2021-12-15 PROCEDURE — 71046 X-RAY EXAM CHEST 2 VIEWS: CPT | Mod: 26

## 2021-12-15 PROCEDURE — 99284 EMERGENCY DEPT VISIT MOD MDM: CPT | Mod: 25

## 2021-12-15 PROCEDURE — 70450 CT HEAD/BRAIN W/O DYE: CPT | Mod: 26,ME

## 2021-12-15 PROCEDURE — 71046 X-RAY EXAM CHEST 2 VIEWS: CPT

## 2021-12-15 PROCEDURE — 84484 ASSAY OF TROPONIN QUANT: CPT

## 2021-12-15 PROCEDURE — G1004: CPT

## 2021-12-15 PROCEDURE — 36415 COLL VENOUS BLD VENIPUNCTURE: CPT

## 2021-12-15 PROCEDURE — 96374 THER/PROPH/DIAG INJ IV PUSH: CPT

## 2021-12-15 PROCEDURE — 0241U: CPT

## 2021-12-15 PROCEDURE — 70450 CT HEAD/BRAIN W/O DYE: CPT | Mod: ME

## 2021-12-15 PROCEDURE — 93010 ELECTROCARDIOGRAM REPORT: CPT

## 2021-12-15 RX ORDER — METOCLOPRAMIDE HCL 10 MG
10 TABLET ORAL ONCE
Refills: 0 | Status: COMPLETED | OUTPATIENT
Start: 2021-12-15 | End: 2021-12-15

## 2021-12-15 RX ADMIN — Medication 10 MILLIGRAM(S): at 09:43

## 2021-12-15 NOTE — ED PROVIDER NOTE - CLINICAL SUMMARY MEDICAL DECISION MAKING FREE TEXT BOX
pt w/ head trauma w/ LOC then w/ chest pain. will CT r/o bleed. Will check troponin, ekg, monitor on telemetry, rule out acute coronary syndrome.

## 2021-12-15 NOTE — ED ADULT TRIAGE NOTE - CHIEF COMPLAINT QUOTE
pt c/o head & chest pain beginning yesterday after a piece of metal fell on his head around 10a. +LOC for appx 25 seconds. denies blood thinners. hx- HLD, HTN.

## 2021-12-15 NOTE — ED PROVIDER NOTE - NSFOLLOWUPINSTRUCTIONS_ED_ALL_ED_FT
Influenza    LO QUE NECESITA SABER:    La influenza (gripe) es opal infección provocada por el virus de la influenza. La gripe se propaga fácilmente cuando opal persona infectada tose, estornuda o tiene contacto cercano con otras personas. Usted podría propagar la gripe a otras personas por opal semana o más después de que aparezcan los signos o síntomas.    INSTRUCCIONES SOBRE EL TODD HOSPITALARIA:    Llame al número de emergencias local (911 en los [place]) si:  •Tiene dificultad para respirar, y su labios lucen púrpuras o azules.      •Usted sufre opal convulsión.      Regrese a la ramos de emergencias si:  •Usted se siente mareado, orina poco o no orina.      •Usted tiene dolor de rosy con rigidez en el lexi y se siente cansado o confundido.      •Usted comienza a sentir dolor o presión en el pecho.      •Su síntomas, tressa falta de aire, vómito o diarrea, empeoran.      •Su síntomas, tressa fiebre y tos, parecen mejorar alexandre luego empeoran.      Llame a tai médico si:  •Usted tiene dolor muscular o debilidad nuevos.      •Usted tiene preguntas o inquietudes acerca de tai condición o cuidado.      Medicamentos:Es posible que usted necesite alguno de los siguientes:   •Acetaminofénalivia el dolor y baja la fiebre. Está disponible sin receta médica. Pregunte la cantidad y la frecuencia con que debe tomarlos. Siga las indicaciones. Sade las etiquetas de todos los demás medicamentos que esté usando para saber si también contienen acetaminofén, o pregunte a tai médico o farmacéutico. El acetaminofén puede causar daño en el hígado cuando no se sae de forma correcta. No use más de 4 gramos (4000 miligramos) en total de acetaminofeno en un día.      •Los CAITLIN,tressa el ibuprofeno, ayudan a disminuir la inflamación, el dolor y la fiebre. Aliyah medicamento está disponible con o sin opal receta médica. Los CAITLIN pueden causar sangrado estomacal o problemas renales en ciertas personas. Si usted sae un medicamento anticoagulante, siempre pregúntele a tai médico si los CAITLIN son seguros para usted. Siempre sade la etiqueta de aliyah medicamento y siga las instrucciones.      •Los antiviralesayudan a combatir opal infección viral.      •Dowling su medicamentos tressa se le haya indicado.Consulte con tai médico si usted joseph que tia medicamento no le está ayudando o si presenta efectos secundarios. Infórmele si es alérgico a cualquier medicamento. Mantenga opal lista actualizada de los medicamentos, las vitaminas y los productos herbales que sae. Incluya los siguientes datos de los medicamentos: cantidad, frecuencia y motivo de administración. Traiga con usted la lista o los envases de las píldoras a su citas de seguimiento. Lleve la lista de los medicamentos con usted en trudy de opal emergencia.      Descansetanto tressa pueda para recuperarse.    Consuma líquidos según le indicaronpara evitar la deshidratación. Pregunte cuánto líquido debe porfirio cada día y cuáles líquidos son los más adecuados para usted.    Prevenga la propagación de gérmenes:         •Lávese las lenora frecuentemente.Lávese las lenora varias veces al día. Lávese después de usar el baño, después de cambiar pañales y antes de preparar la comida o comer. Use siempre agua y jabón. Frótese las lenora enjabonadas, entrelazando los dedos. Lávese el frente y el dorso de las lenora, y entre los dedos. Use los dedos de opal mano para restregar debajo de las uñas de la otra mano. Lávese chetan al menos 20 segundos. Enjuague con agua corriente caliente chetan varios segundos. Luego séquese las lenora con opal toalla limpia o opal toalla de papel. Puede usar un desinfectante para lenora que contenga alcohol, si no hay agua y jabón disponibles. No se toque los ojos, la nariz o la boca sin antes lavarse las lenora.  Lavado de lenora           •Cúbrase al toser o estornudar.Use un pañuelo que cubra la boca y la nariz. Arroje el pañuelo a la basura de inmediato. Use el ángulo del brazo si no tiene un pañuelo disponible. Lávese las lenora con agua y jabón o use un desinfectante de lenora.      •Manténgase alejado de los demás mientras esté enfermo.Evite las multitudes lo más que pueda.      •Pregunte sobre las vacunas que pudiera necesitar.Hable con tai médico sobre tai historial de vacunación. Tai médico le indicará qué vacunas necesita y cuándo recibirlas.?Vacúnese contra la influenza (gripe) gripe tan pronto esté disponible.Los virus de la gripe cambian, por lo que es importante vacunarse contra la gripe cada año.      ?Vacúnese contra la neumonía si se recomienda.Esta vacuna generalmente se recomienda cada 5 años. Tai médico le indicará cuándo recibir esta vacuna, de ser necesaria.        Acuda a la consulta de control con tai médico según las indicaciones:Anote su preguntas para que se acuerde de hacerlas chetan su visitas.       © Copyright [employer] 2021           back to top                          © Copyright [employer] 2021

## 2021-12-15 NOTE — ED ADULT NURSE NOTE - OBJECTIVE STATEMENT
pt presents to ED from home pt alert and orientedx4, VSs afebrile, pt c/o head and chest pain following a piece of metal falling onto patient  yesterday hitting him in the head causing LOC. pt denies dizziness cognitive or motor impairment at this time, pt ambulatory and able to move all extremtities, safety maintained

## 2021-12-15 NOTE — ED PROVIDER NOTE - OBJECTIVE STATEMENT
55 y/o M w/ PMHx of asthma, HLD, HTN, presents to ED c/o midsternal chest pain and headache s/p head injury yesterday morning. Pt reports he was working w/  and he was hit in the head w/ a piece of metal, his chest began to hurt and he lost consciousness w/ unknown down time. Pt reports later that night he felt like he had a fever w/ associated sweats. Pt also endorses intermittent headache and dizziness. Denies n/v/d, SOB, numbness, weakness, abd pain. Pt is not on blood thinners.

## 2022-02-07 ENCOUNTER — APPOINTMENT (OUTPATIENT)
Dept: OTOLARYNGOLOGY | Facility: CLINIC | Age: 57
End: 2022-02-07
Payer: MEDICAID

## 2022-02-07 VITALS
HEIGHT: 60 IN | WEIGHT: 234 LBS | HEART RATE: 101 BPM | DIASTOLIC BLOOD PRESSURE: 89 MMHG | SYSTOLIC BLOOD PRESSURE: 159 MMHG | TEMPERATURE: 98.2 F | BODY MASS INDEX: 45.94 KG/M2

## 2022-02-07 DIAGNOSIS — I10 ESSENTIAL (PRIMARY) HYPERTENSION: ICD-10-CM

## 2022-02-07 DIAGNOSIS — Z87.09 PERSONAL HISTORY OF OTHER DISEASES OF THE RESPIRATORY SYSTEM: ICD-10-CM

## 2022-02-07 DIAGNOSIS — E78.00 PURE HYPERCHOLESTEROLEMIA, UNSPECIFIED: ICD-10-CM

## 2022-02-07 DIAGNOSIS — H92.02 OTALGIA, LEFT EAR: ICD-10-CM

## 2022-02-07 DIAGNOSIS — H60.543 ACUTE ECZEMATOID OTITIS EXTERNA, BILATERAL: ICD-10-CM

## 2022-02-07 PROCEDURE — 99203 OFFICE O/P NEW LOW 30 MIN: CPT

## 2022-02-07 RX ORDER — MOMETASONE FUROATE 1 MG/G
0.1 OINTMENT TOPICAL
Qty: 1 | Refills: 2 | Status: ACTIVE | COMMUNITY
Start: 2022-02-07 | End: 1900-01-01

## 2022-02-07 NOTE — ASSESSMENT
[FreeTextEntry1] : exam unremarkable\par probable mild ear eczema\par trial mometasone ointment prn as directed\par fu prn

## 2022-02-07 NOTE — HISTORY OF PRESENT ILLNESS
[de-identified] : co left ear discomfort w itching and movement in ear\par past 4 weeks,\par no co hearing loss

## 2022-02-07 NOTE — PHYSICAL EXAM
[Normal] : mucosa is normal [Midline] : trachea located in midline position [de-identified] : unremarkable

## 2022-07-24 NOTE — ED STATDOCS - FALL HARM RISK TYPE OF ASSESSMENT
General: Tearful due to pain, appears uncomfortable  HEENT: NC/AT, EOMI, No congestion or rhinorrhea, Throat nonerythematous with no lesions. MMM  Ears: TM clear on right, need to recheck TM on L (looked perforated)  Neck: R sided ~2cm neck mass, no overlying erythema, tender to palpation, limited neck ROM due to pain  Resp: Normal respiratory effort, no tachypnea, CTAB, no wheezing or crackles.  CV: Regular rate and rhythm, normal S1 S2, no murmurs.   GI: Abdomen soft, nontender, nondistended.  Skin: No other rashes or lesions.  MSK/Extremities: No joint swelling or tenderness, no stiffness, WWP, Cap refill <2secs.  Neuro: Cranial nerves grossly intact
Admission

## 2022-09-27 ENCOUNTER — INPATIENT (INPATIENT)
Facility: HOSPITAL | Age: 57
LOS: 7 days | Discharge: HOME | DRG: 137 | End: 2022-10-05
Attending: FAMILY MEDICINE | Admitting: INTERNAL MEDICINE

## 2022-09-27 VITALS — HEIGHT: 66 IN | WEIGHT: 235.01 LBS

## 2022-09-27 DIAGNOSIS — I48.91 UNSPECIFIED ATRIAL FIBRILLATION: ICD-10-CM

## 2022-09-27 LAB
ALBUMIN SERPL ELPH-MCNC: 3.7 G/DL — SIGNIFICANT CHANGE UP (ref 3.3–5)
ALP SERPL-CCNC: 78 U/L — SIGNIFICANT CHANGE UP (ref 40–120)
ALT FLD-CCNC: 70 U/L — SIGNIFICANT CHANGE UP (ref 12–78)
ANION GAP SERPL CALC-SCNC: 6 MMOL/L — SIGNIFICANT CHANGE UP (ref 5–17)
AST SERPL-CCNC: 20 U/L — SIGNIFICANT CHANGE UP (ref 15–37)
BASOPHILS # BLD AUTO: 0 K/UL — SIGNIFICANT CHANGE UP (ref 0–0.2)
BASOPHILS NFR BLD AUTO: 0 % — SIGNIFICANT CHANGE UP (ref 0–2)
BILIRUB SERPL-MCNC: 1.4 MG/DL — HIGH (ref 0.2–1.2)
BUN SERPL-MCNC: 15 MG/DL — SIGNIFICANT CHANGE UP (ref 7–23)
CALCIUM SERPL-MCNC: 9.1 MG/DL — SIGNIFICANT CHANGE UP (ref 8.5–10.1)
CHLORIDE SERPL-SCNC: 106 MMOL/L — SIGNIFICANT CHANGE UP (ref 96–108)
CO2 SERPL-SCNC: 28 MMOL/L — SIGNIFICANT CHANGE UP (ref 22–31)
CREAT SERPL-MCNC: 0.91 MG/DL — SIGNIFICANT CHANGE UP (ref 0.5–1.3)
CRP SERPL-MCNC: 57 MG/L — HIGH
D DIMER BLD IA.RAPID-MCNC: <150 NG/ML DDU — SIGNIFICANT CHANGE UP
EGFR: 98 ML/MIN/1.73M2 — SIGNIFICANT CHANGE UP
EOSINOPHIL # BLD AUTO: 0 K/UL — SIGNIFICANT CHANGE UP (ref 0–0.5)
EOSINOPHIL NFR BLD AUTO: 0 % — SIGNIFICANT CHANGE UP (ref 0–6)
FERRITIN SERPL-MCNC: 268 NG/ML — SIGNIFICANT CHANGE UP (ref 30–400)
GLUCOSE SERPL-MCNC: 120 MG/DL — HIGH (ref 70–99)
HCT VFR BLD CALC: 47.2 % — SIGNIFICANT CHANGE UP (ref 39–50)
HGB BLD-MCNC: 15.9 G/DL — SIGNIFICANT CHANGE UP (ref 13–17)
LACTATE SERPL-SCNC: 1.4 MMOL/L — SIGNIFICANT CHANGE UP (ref 0.7–2)
LYMPHOCYTES # BLD AUTO: 0.36 K/UL — LOW (ref 1–3.3)
LYMPHOCYTES # BLD AUTO: 1 % — LOW (ref 13–44)
MCHC RBC-ENTMCNC: 28.9 PG — SIGNIFICANT CHANGE UP (ref 27–34)
MCHC RBC-ENTMCNC: 33.7 GM/DL — SIGNIFICANT CHANGE UP (ref 32–36)
MCV RBC AUTO: 85.7 FL — SIGNIFICANT CHANGE UP (ref 80–100)
MONOCYTES # BLD AUTO: 2.52 K/UL — HIGH (ref 0–0.9)
MONOCYTES NFR BLD AUTO: 7 % — SIGNIFICANT CHANGE UP (ref 2–14)
NEUTROPHILS # BLD AUTO: 33.11 K/UL — HIGH (ref 1.8–7.4)
NEUTROPHILS NFR BLD AUTO: 92 % — HIGH (ref 43–77)
NRBC # BLD: SIGNIFICANT CHANGE UP /100 WBCS (ref 0–0)
NT-PROBNP SERPL-SCNC: 1371 PG/ML — HIGH (ref 0–125)
PLATELET # BLD AUTO: 294 K/UL — SIGNIFICANT CHANGE UP (ref 150–400)
POTASSIUM SERPL-MCNC: 3.6 MMOL/L — SIGNIFICANT CHANGE UP (ref 3.5–5.3)
POTASSIUM SERPL-SCNC: 3.6 MMOL/L — SIGNIFICANT CHANGE UP (ref 3.5–5.3)
PROCALCITONIN SERPL-MCNC: 0.05 NG/ML — SIGNIFICANT CHANGE UP (ref 0.02–0.1)
PROT SERPL-MCNC: 6.7 GM/DL — SIGNIFICANT CHANGE UP (ref 6–8.3)
RAPID RVP RESULT: DETECTED
RBC # BLD: 5.51 M/UL — SIGNIFICANT CHANGE UP (ref 4.2–5.8)
RBC # FLD: 13.9 % — SIGNIFICANT CHANGE UP (ref 10.3–14.5)
RV+EV RNA SPEC QL NAA+PROBE: DETECTED
SARS-COV-2 RNA SPEC QL NAA+PROBE: DETECTED
SODIUM SERPL-SCNC: 140 MMOL/L — SIGNIFICANT CHANGE UP (ref 135–145)
TROPONIN I, HIGH SENSITIVITY RESULT: 5.67 NG/L — SIGNIFICANT CHANGE UP
TROPONIN I, HIGH SENSITIVITY RESULT: 5.72 NG/L — SIGNIFICANT CHANGE UP
TROPONIN I, HIGH SENSITIVITY RESULT: 5.96 NG/L — SIGNIFICANT CHANGE UP
WBC # BLD: 35.99 K/UL — HIGH (ref 3.8–10.5)
WBC # FLD AUTO: 35.99 K/UL — HIGH (ref 3.8–10.5)

## 2022-09-27 PROCEDURE — 99223 1ST HOSP IP/OBS HIGH 75: CPT

## 2022-09-27 PROCEDURE — 84484 ASSAY OF TROPONIN QUANT: CPT

## 2022-09-27 PROCEDURE — 80053 COMPREHEN METABOLIC PANEL: CPT

## 2022-09-27 PROCEDURE — 71046 X-RAY EXAM CHEST 2 VIEWS: CPT | Mod: 26

## 2022-09-27 PROCEDURE — 93017 CV STRESS TEST TRACING ONLY: CPT

## 2022-09-27 PROCEDURE — 78452 HT MUSCLE IMAGE SPECT MULT: CPT

## 2022-09-27 PROCEDURE — 93010 ELECTROCARDIOGRAM REPORT: CPT

## 2022-09-27 PROCEDURE — 85025 COMPLETE CBC W/AUTO DIFF WBC: CPT

## 2022-09-27 PROCEDURE — 82962 GLUCOSE BLOOD TEST: CPT

## 2022-09-27 PROCEDURE — 36415 COLL VENOUS BLD VENIPUNCTURE: CPT

## 2022-09-27 PROCEDURE — 99285 EMERGENCY DEPT VISIT HI MDM: CPT

## 2022-09-27 PROCEDURE — 93306 TTE W/DOPPLER COMPLETE: CPT

## 2022-09-27 PROCEDURE — 83036 HEMOGLOBIN GLYCOSYLATED A1C: CPT

## 2022-09-27 PROCEDURE — 80048 BASIC METABOLIC PNL TOTAL CA: CPT

## 2022-09-27 PROCEDURE — A9500: CPT

## 2022-09-27 PROCEDURE — 85027 COMPLETE CBC AUTOMATED: CPT

## 2022-09-27 RX ORDER — DEXTROSE 50 % IN WATER 50 %
25 SYRINGE (ML) INTRAVENOUS ONCE
Refills: 0 | Status: DISCONTINUED | OUTPATIENT
Start: 2022-09-27 | End: 2022-10-05

## 2022-09-27 RX ORDER — ALBUTEROL 90 UG/1
2 AEROSOL, METERED ORAL EVERY 4 HOURS
Refills: 0 | Status: DISCONTINUED | OUTPATIENT
Start: 2022-09-27 | End: 2022-10-05

## 2022-09-27 RX ORDER — SODIUM CHLORIDE 9 MG/ML
1000 INJECTION, SOLUTION INTRAVENOUS
Refills: 0 | Status: DISCONTINUED | OUTPATIENT
Start: 2022-09-27 | End: 2022-10-05

## 2022-09-27 RX ORDER — IPRATROPIUM/ALBUTEROL SULFATE 18-103MCG
3 AEROSOL WITH ADAPTER (GRAM) INHALATION ONCE
Refills: 0 | Status: COMPLETED | OUTPATIENT
Start: 2022-09-27 | End: 2022-09-27

## 2022-09-27 RX ORDER — ACETAMINOPHEN 500 MG
1000 TABLET ORAL ONCE
Refills: 0 | Status: COMPLETED | OUTPATIENT
Start: 2022-09-27 | End: 2022-09-27

## 2022-09-27 RX ORDER — IBUPROFEN 200 MG
600 TABLET ORAL ONCE
Refills: 0 | Status: COMPLETED | OUTPATIENT
Start: 2022-09-27 | End: 2022-09-27

## 2022-09-27 RX ORDER — DEXTROSE 50 % IN WATER 50 %
12.5 SYRINGE (ML) INTRAVENOUS ONCE
Refills: 0 | Status: DISCONTINUED | OUTPATIENT
Start: 2022-09-27 | End: 2022-10-05

## 2022-09-27 RX ORDER — AZITHROMYCIN 500 MG/1
500 TABLET, FILM COATED ORAL ONCE
Refills: 0 | Status: COMPLETED | OUTPATIENT
Start: 2022-09-27 | End: 2022-09-27

## 2022-09-27 RX ORDER — DILTIAZEM HCL 120 MG
30 CAPSULE, EXT RELEASE 24 HR ORAL EVERY 6 HOURS
Refills: 0 | Status: DISCONTINUED | OUTPATIENT
Start: 2022-09-27 | End: 2022-09-28

## 2022-09-27 RX ORDER — ATORVASTATIN CALCIUM 80 MG/1
20 TABLET, FILM COATED ORAL AT BEDTIME
Refills: 0 | Status: DISCONTINUED | OUTPATIENT
Start: 2022-09-27 | End: 2022-10-05

## 2022-09-27 RX ORDER — INSULIN LISPRO 100/ML
VIAL (ML) SUBCUTANEOUS
Refills: 0 | Status: DISCONTINUED | OUTPATIENT
Start: 2022-09-27 | End: 2022-10-05

## 2022-09-27 RX ORDER — DILTIAZEM HCL 120 MG
10 CAPSULE, EXT RELEASE 24 HR ORAL ONCE
Refills: 0 | Status: COMPLETED | OUTPATIENT
Start: 2022-09-27 | End: 2022-09-27

## 2022-09-27 RX ORDER — AZITHROMYCIN 500 MG/1
500 TABLET, FILM COATED ORAL DAILY
Refills: 0 | Status: DISCONTINUED | OUTPATIENT
Start: 2022-09-28 | End: 2022-10-01

## 2022-09-27 RX ORDER — INSULIN GLARGINE 100 [IU]/ML
10 INJECTION, SOLUTION SUBCUTANEOUS AT BEDTIME
Refills: 0 | Status: DISCONTINUED | OUTPATIENT
Start: 2022-09-27 | End: 2022-09-30

## 2022-09-27 RX ORDER — GLUCAGON INJECTION, SOLUTION 0.5 MG/.1ML
1 INJECTION, SOLUTION SUBCUTANEOUS ONCE
Refills: 0 | Status: DISCONTINUED | OUTPATIENT
Start: 2022-09-27 | End: 2022-10-05

## 2022-09-27 RX ORDER — ALBUTEROL 90 UG/1
4 AEROSOL, METERED ORAL ONCE
Refills: 0 | Status: COMPLETED | OUTPATIENT
Start: 2022-09-27 | End: 2022-09-27

## 2022-09-27 RX ORDER — SIMVASTATIN 20 MG/1
1 TABLET, FILM COATED ORAL
Qty: 0 | Refills: 0 | DISCHARGE

## 2022-09-27 RX ORDER — LOSARTAN/HYDROCHLOROTHIAZIDE 100MG-25MG
1 TABLET ORAL
Qty: 0 | Refills: 0 | DISCHARGE

## 2022-09-27 RX ORDER — LOSARTAN POTASSIUM 100 MG/1
100 TABLET, FILM COATED ORAL DAILY
Refills: 0 | Status: DISCONTINUED | OUTPATIENT
Start: 2022-09-27 | End: 2022-09-28

## 2022-09-27 RX ORDER — SODIUM CHLORIDE 9 MG/ML
1000 INJECTION INTRAMUSCULAR; INTRAVENOUS; SUBCUTANEOUS ONCE
Refills: 0 | Status: COMPLETED | OUTPATIENT
Start: 2022-09-27 | End: 2022-09-27

## 2022-09-27 RX ORDER — DEXAMETHASONE 0.5 MG/5ML
6 ELIXIR ORAL DAILY
Refills: 0 | Status: DISCONTINUED | OUTPATIENT
Start: 2022-09-27 | End: 2022-09-30

## 2022-09-27 RX ORDER — DEXTROSE 50 % IN WATER 50 %
15 SYRINGE (ML) INTRAVENOUS ONCE
Refills: 0 | Status: DISCONTINUED | OUTPATIENT
Start: 2022-09-27 | End: 2022-10-05

## 2022-09-27 RX ORDER — APIXABAN 2.5 MG/1
5 TABLET, FILM COATED ORAL EVERY 12 HOURS
Refills: 0 | Status: DISCONTINUED | OUTPATIENT
Start: 2022-09-27 | End: 2022-10-05

## 2022-09-27 RX ORDER — CEFTRIAXONE 500 MG/1
1000 INJECTION, POWDER, FOR SOLUTION INTRAMUSCULAR; INTRAVENOUS ONCE
Refills: 0 | Status: COMPLETED | OUTPATIENT
Start: 2022-09-27 | End: 2022-09-27

## 2022-09-27 RX ORDER — DEXAMETHASONE 0.5 MG/5ML
6 ELIXIR ORAL DAILY
Refills: 0 | Status: DISCONTINUED | OUTPATIENT
Start: 2022-09-27 | End: 2022-09-27

## 2022-09-27 RX ORDER — CEFTRIAXONE 500 MG/1
1000 INJECTION, POWDER, FOR SOLUTION INTRAMUSCULAR; INTRAVENOUS EVERY 24 HOURS
Refills: 0 | Status: DISCONTINUED | OUTPATIENT
Start: 2022-09-28 | End: 2022-10-01

## 2022-09-27 RX ADMIN — AZITHROMYCIN 255 MILLIGRAM(S): 500 TABLET, FILM COATED ORAL at 15:08

## 2022-09-27 RX ADMIN — SODIUM CHLORIDE 1000 MILLILITER(S): 9 INJECTION INTRAMUSCULAR; INTRAVENOUS; SUBCUTANEOUS at 14:03

## 2022-09-27 RX ADMIN — CEFTRIAXONE 1000 MILLIGRAM(S): 500 INJECTION, POWDER, FOR SOLUTION INTRAMUSCULAR; INTRAVENOUS at 14:34

## 2022-09-27 RX ADMIN — Medication 60 MILLIGRAM(S): at 11:46

## 2022-09-27 RX ADMIN — Medication 1000 MILLIGRAM(S): at 14:04

## 2022-09-27 RX ADMIN — Medication 10 MILLIGRAM(S): at 16:41

## 2022-09-27 RX ADMIN — SODIUM CHLORIDE 1000 MILLILITER(S): 9 INJECTION INTRAMUSCULAR; INTRAVENOUS; SUBCUTANEOUS at 15:03

## 2022-09-27 RX ADMIN — Medication 6 MILLIGRAM(S): at 22:19

## 2022-09-27 RX ADMIN — Medication 3 MILLILITER(S): at 11:46

## 2022-09-27 RX ADMIN — Medication 600 MILLIGRAM(S): at 14:04

## 2022-09-27 RX ADMIN — CEFTRIAXONE 100 MILLIGRAM(S): 500 INJECTION, POWDER, FOR SOLUTION INTRAMUSCULAR; INTRAVENOUS at 14:04

## 2022-09-27 RX ADMIN — ALBUTEROL 4 PUFF(S): 90 AEROSOL, METERED ORAL at 11:59

## 2022-09-27 RX ADMIN — AZITHROMYCIN 500 MILLIGRAM(S): 500 TABLET, FILM COATED ORAL at 16:08

## 2022-09-27 RX ADMIN — Medication 30 MILLIGRAM(S): at 22:19

## 2022-09-27 NOTE — ED STATDOCS - PROGRESS NOTE DETAILS
signed Shagufta Rivera PA-C Pt seen initially in intake by Dr Da Silva.   pt with cough, + COVID-19 and  entero/rhino virus. +bilat PNA on cxr. WBC 35.9, will admit for IV abx, possible superinfection. Pt initially not tachy but when EKG performed pt in Afib with RVR 130s. Will give tylenol/motrin and 1 Liter IV fluid and reassess rate prior to giving cardiac meds. signed Shagufta Rivera PA-C  ID [id number]  pt still tachy 10s to 130 after fluids, tylenol and motrin. Will try IV diltiazem. Pt alert, NAD, has mild left sided chest pain since last night. Pt denies known hx of a fib, though EKG from 2020 demonstrates Afib. Pt not on medication for such. PMD [facility]. Pt unsure who his cardiologist is. Will admit for afib with rvr, covid and PNA. Pt agrees with admission and plan of care. Hospitalist paged. signed Shagufta Rivera PA-C   Case discussed with and admission accepted by hospitalist Dr. Martin.

## 2022-09-27 NOTE — ED STATDOCS - PHYSICAL EXAMINATION
Constitutional: NAD, well appearing  HEENT: no rhinorrhea, PERRL, no oropharyngeal erythema or exudates, midline uvula.  TMs clear.  CVS:  RRR, no m/r/g  Resp:  CTAB  GI: soft, ntnd  MSK:  no restriction to rom, full ROM to all extremities  Neuro:  A&Ox3, 5/5 strength to all extremities,  SILT to all extremities  Skin: no rash  psych: clear thought content  Heme/lymph:  No LAD Constitutional: NAD, well appearing  HEENT: no rhinorrhea, PERRL, no oropharyngeal erythema or exudates, midline uvula.  TMs clear.  CVS:  RRR, no m/r/g  Resp:  Wheezing b/l right greater than left.  GI: soft, ntnd  MSK:  no restriction to rom, full ROM to all extremities  Neuro:  A&Ox3, 5/5 strength to all extremities,  SILT to all extremities  Skin: no rash  psych: clear thought content  Heme/lymph:  No LAD

## 2022-09-27 NOTE — ED STATDOCS - CARE PLAN
1 Principal Discharge DX:	Atrial fibrillation with RVR  Secondary Diagnosis:	2019 novel coronavirus disease (COVID-19)  Secondary Diagnosis:	PNA (pneumonia)

## 2022-09-27 NOTE — ED STATDOCS - NS ED ATTENDING STATEMENT MOD
This was a shared visit with the MELODY. I reviewed and verified the documentation and independently performed the documented:

## 2022-09-27 NOTE — H&P ADULT - HISTORY OF PRESENT ILLNESS
· 55 y/o male with a PMHx of asthma, COVID, HTN, HLD, DM type II, presents to the ED c/o productive cough x2 weeks and dyspnea. He states that his dyspnea has gotten worse over the past few days. His cough is productive of yellow sputum  Patient was found to have Covid, Rt lobar Pna and new onset Afib.      PAST MEDICAL HISTORY:  Asthma   High cholesterol   HTN (hypertension)     Past surgical history:  none    Family history:  DM type II: mother     Social Hx:   no smoking, no Etoh      REVIEW OF SYSTEMS:    CONSTITUTIONAL: No weakness, No fevers or chills  ENT: No ear ache, No sorethroat  NECK: No pain, No stiffness  RESPIRATORY: ++ cough, No wheezing, No hemoptysis; +dyspnea  CARDIOVASCULAR: No chest pain, No palpitations  GASTROINTESTINAL: No abd pain, No nausea, No vomiting, No hematemesis, No diarrhea or constipation. No melena, No hematochezia.  GENITOURINARY: No dysuria, No  hematuria  NEUROLOGICAL: No diplopia, No paresthesia, No motor dysfunction  MUSCULOSKELETAL: No arthralgia, No myalgia  SKIN: No rashes, or lesions   PSYCH: no anxiety, no suicidal ideation    All other review of systems is negative unless indicated above    Vital Signs Last 24 Hrs  T(C): 36.8 (27 Sep 2022 14:00), Max: 37.6 (27 Sep 2022 09:30)  T(F): 98.3 (27 Sep 2022 14:00), Max: 99.6 (27 Sep 2022 09:30)  HR: 84 (27 Sep 2022 19:53) (84 - 140)  BP: 131/98 (27 Sep 2022 19:53) (131/98 - 144/109)  BP(mean): 105 (27 Sep 2022 09:30) (105 - 105)  RR: 16 (27 Sep 2022 14:00) (16 - 18)  SpO2: 99% (27 Sep 2022 14:00) (98% - 99%)    Parameters below as of 27 Sep 2022 14:00  Patient On (Oxygen Delivery Method): room air        PHYSICAL EXAM:    GENERAL: NAD  HEENT:  NC/AT, EOMI, PERRLA, No scleral icterus, Moist mucous membranes  NECK: Supple, No JVD  CNS:  Alert & Oriented X3, Motor Strength 5/5 B/L upper and lower extremities; DTRs 2+ intact   LUNG: Normal Breath sounds, bilateral rhonchi and rales Rt>Lt  HEART: RRR; No murmurs, No rubs  ABDOMEN: +BS, ST/ND/NT  GENITOURINARY: Voiding, Bladder not distended  EXTREMITIES:  2+ Peripheral Pulses, No clubbing, No cyanosis, No tibial edema  MUSCULOSKELTAL: Joints normal ROM, No TTP, No effusion  VAGINAL: deferred  SKIN: no rashes  RECTAL: deferred, not indicated  BREAST: deferred                          15.9   35.99 )-----------( 294      ( 27 Sep 2022 10:47 )             47.2     09-27    140  |  106  |  15  ----------------------------<  120<H>  3.6   |  28  |  0.91    Ca    9.1      27 Sep 2022 10:47    TPro  6.7  /  Alb  3.7  /  TBili  1.4<H>  /  DBili  x   /  AST  20  /  ALT  70  /  AlkPhos  78  09-27    Vancomycin levels:   Cultures:     MEDICATIONS  (STANDING):  apixaban 5 milliGRAM(s) Oral every 12 hours  atorvastatin 20 milliGRAM(s) Oral at bedtime  azithromycin   Tablet 500 milliGRAM(s) Oral daily  cefTRIAXone   IVPB 1000 milliGRAM(s) IV Intermittent every 24 hours  dexAMETHasone  Injectable 6 milliGRAM(s) IV Push daily  dextrose 50% Injectable 25 Gram(s) IV Push once  diltiazem    Tablet 30 milliGRAM(s) Oral every 6 hours  glucagon  Injectable 1 milliGRAM(s) IntraMuscular once  guaiFENesin  milliGRAM(s) Oral every 12 hours  insulin glargine Injectable (LANTUS) 10 Unit(s) SubCutaneous at bedtime  insulin lispro (ADMELOG) corrective regimen sliding scale   SubCutaneous three times a day before meals  losartan 100 milliGRAM(s) Oral daily    MEDICATIONS  (PRN):  ALBUTerol    90 MICROgram(s) HFA Inhaler 2 Puff(s) Inhalation every 4 hours PRN Shortness of Breath and/or Wheezing  dextrose Oral Gel 15 Gram(s) Oral once PRN Blood Glucose LESS THAN 70 milliGRAM(s)/deciliter      all labs reviewed  all imaging reviewed      a/p:    1. RT lobar Pna:  likely bacterial superimposed on Covid Uri  Ceftriaxone/Zithromax  Decadron IV  Albuterol/Mucinex  Blood Cx    2. New onset Afib:  Cardizem po   TTE  Apixaban Bid  Cardiology evaluation   Telemetry monitoring    3. DM type II:  ADA  ISS      Full Code

## 2022-09-27 NOTE — ED STATDOCS - NS ED ROS FT
Constitutional: nad, well appearing  HEENT:  no nasal congestion, eye drainage or ear pain.    CVS:  no cp  Resp:  No sob, + cough  GI:  no abdominal pain, no nausea or vomiting  :  no dysuria  MSK: no joint pain or limited ROM  Skin: no rash  Neuro: no change in mental status or level of consciousness  Heme/lymph: no bleeding

## 2022-09-27 NOTE — ED STATDOCS - CLINICAL SUMMARY MEDICAL DECISION MAKING FREE TEXT BOX
Likely URI exacerbating asthma. O2 sat 95% but no labored breathing. Will provide nebs, chest XR, steroids, reassess.

## 2022-09-27 NOTE — ED ADULT NURSE REASSESSMENT NOTE - NS ED NURSE REASSESS COMMENT FT1
Report received from previous RN. Patient in new rapid afib heart rate ranging from 105-150 has no complaints.

## 2022-09-27 NOTE — ED STATDOCS - OBJECTIVE STATEMENT
57 y/o male with a PMHx of asthma, COVID, HTN, HLD presents to the ED c/o productive cough x4 weeks. Denies fevers. No other complaints at this time.

## 2022-09-27 NOTE — ED ADULT TRIAGE NOTE - CHIEF COMPLAINT QUOTE
Pt a/ox3, reports cough x4 weeks. pt reports "worsening cough at night for 4 weeks, and feeling hot but I did not take my temperature". pt denies all other complaints.

## 2022-09-27 NOTE — ED ADULT NURSE NOTE - OBJECTIVE STATEMENT
57 y/o alert male presents to the ED for c/o productive cough x4 weeks. Denies fevers. No other complaints at this time.

## 2022-09-28 DIAGNOSIS — I48.0 PAROXYSMAL ATRIAL FIBRILLATION: ICD-10-CM

## 2022-09-28 LAB
A1C WITH ESTIMATED AVERAGE GLUCOSE RESULT: 6.6 % — HIGH (ref 4–5.6)
BASOPHILS # BLD AUTO: 0 K/UL — SIGNIFICANT CHANGE UP (ref 0–0.2)
BASOPHILS NFR BLD AUTO: 0 % — SIGNIFICANT CHANGE UP (ref 0–2)
EOSINOPHIL # BLD AUTO: 0 K/UL — SIGNIFICANT CHANGE UP (ref 0–0.5)
EOSINOPHIL NFR BLD AUTO: 0 % — SIGNIFICANT CHANGE UP (ref 0–6)
ESTIMATED AVERAGE GLUCOSE: 143 MG/DL — HIGH (ref 68–114)
HCT VFR BLD CALC: 47.3 % — SIGNIFICANT CHANGE UP (ref 39–50)
HGB BLD-MCNC: 16 G/DL — SIGNIFICANT CHANGE UP (ref 13–17)
LYMPHOCYTES # BLD AUTO: 1.41 K/UL — SIGNIFICANT CHANGE UP (ref 1–3.3)
LYMPHOCYTES # BLD AUTO: 5 % — LOW (ref 13–44)
MCHC RBC-ENTMCNC: 29 PG — SIGNIFICANT CHANGE UP (ref 27–34)
MCHC RBC-ENTMCNC: 33.8 GM/DL — SIGNIFICANT CHANGE UP (ref 32–36)
MCV RBC AUTO: 85.7 FL — SIGNIFICANT CHANGE UP (ref 80–100)
MONOCYTES # BLD AUTO: 0.84 K/UL — SIGNIFICANT CHANGE UP (ref 0–0.9)
MONOCYTES NFR BLD AUTO: 3 % — SIGNIFICANT CHANGE UP (ref 2–14)
NEUTROPHILS # BLD AUTO: 25.87 K/UL — HIGH (ref 1.8–7.4)
NEUTROPHILS NFR BLD AUTO: 91 % — HIGH (ref 43–77)
NRBC # BLD: SIGNIFICANT CHANGE UP /100 WBCS (ref 0–0)
PLATELET # BLD AUTO: 296 K/UL — SIGNIFICANT CHANGE UP (ref 150–400)
RBC # BLD: 5.52 M/UL — SIGNIFICANT CHANGE UP (ref 4.2–5.8)
RBC # FLD: 14.1 % — SIGNIFICANT CHANGE UP (ref 10.3–14.5)
TROPONIN I, HIGH SENSITIVITY RESULT: 4.52 NG/L — SIGNIFICANT CHANGE UP
WBC # BLD: 28.12 K/UL — HIGH (ref 3.8–10.5)
WBC # FLD AUTO: 28.12 K/UL — HIGH (ref 3.8–10.5)

## 2022-09-28 PROCEDURE — 99233 SBSQ HOSP IP/OBS HIGH 50: CPT

## 2022-09-28 PROCEDURE — 99223 1ST HOSP IP/OBS HIGH 75: CPT

## 2022-09-28 RX ORDER — LOSARTAN POTASSIUM 100 MG/1
50 TABLET, FILM COATED ORAL DAILY
Refills: 0 | Status: DISCONTINUED | OUTPATIENT
Start: 2022-09-28 | End: 2022-10-05

## 2022-09-28 RX ORDER — POTASSIUM CHLORIDE 20 MEQ
40 PACKET (EA) ORAL ONCE
Refills: 0 | Status: COMPLETED | OUTPATIENT
Start: 2022-09-28 | End: 2022-09-28

## 2022-09-28 RX ORDER — LOSARTAN POTASSIUM 100 MG/1
50 TABLET, FILM COATED ORAL DAILY
Refills: 0 | Status: DISCONTINUED | OUTPATIENT
Start: 2022-09-28 | End: 2022-09-28

## 2022-09-28 RX ORDER — DILTIAZEM HCL 120 MG
60 CAPSULE, EXT RELEASE 24 HR ORAL EVERY 6 HOURS
Refills: 0 | Status: DISCONTINUED | OUTPATIENT
Start: 2022-09-28 | End: 2022-09-30

## 2022-09-28 RX ORDER — METOPROLOL TARTRATE 50 MG
50 TABLET ORAL DAILY
Refills: 0 | Status: DISCONTINUED | OUTPATIENT
Start: 2022-09-28 | End: 2022-09-28

## 2022-09-28 RX ORDER — METOPROLOL TARTRATE 50 MG
50 TABLET ORAL DAILY
Refills: 0 | Status: DISCONTINUED | OUTPATIENT
Start: 2022-09-28 | End: 2022-10-05

## 2022-09-28 RX ADMIN — Medication 600 MILLIGRAM(S): at 22:29

## 2022-09-28 RX ADMIN — AZITHROMYCIN 500 MILLIGRAM(S): 500 TABLET, FILM COATED ORAL at 10:05

## 2022-09-28 RX ADMIN — ATORVASTATIN CALCIUM 20 MILLIGRAM(S): 80 TABLET, FILM COATED ORAL at 00:18

## 2022-09-28 RX ADMIN — APIXABAN 5 MILLIGRAM(S): 2.5 TABLET, FILM COATED ORAL at 22:29

## 2022-09-28 RX ADMIN — Medication 40 MILLIEQUIVALENT(S): at 18:55

## 2022-09-28 RX ADMIN — Medication 600 MILLIGRAM(S): at 10:06

## 2022-09-28 RX ADMIN — Medication 6 MILLIGRAM(S): at 10:05

## 2022-09-28 RX ADMIN — LOSARTAN POTASSIUM 50 MILLIGRAM(S): 100 TABLET, FILM COATED ORAL at 12:23

## 2022-09-28 RX ADMIN — APIXABAN 5 MILLIGRAM(S): 2.5 TABLET, FILM COATED ORAL at 10:04

## 2022-09-28 RX ADMIN — Medication 60 MILLIGRAM(S): at 17:43

## 2022-09-28 RX ADMIN — ATORVASTATIN CALCIUM 20 MILLIGRAM(S): 80 TABLET, FILM COATED ORAL at 22:29

## 2022-09-28 RX ADMIN — APIXABAN 5 MILLIGRAM(S): 2.5 TABLET, FILM COATED ORAL at 00:18

## 2022-09-28 RX ADMIN — Medication 600 MILLIGRAM(S): at 00:18

## 2022-09-28 RX ADMIN — Medication 30 MILLIGRAM(S): at 12:23

## 2022-09-28 RX ADMIN — INSULIN GLARGINE 10 UNIT(S): 100 INJECTION, SOLUTION SUBCUTANEOUS at 00:29

## 2022-09-28 RX ADMIN — INSULIN GLARGINE 10 UNIT(S): 100 INJECTION, SOLUTION SUBCUTANEOUS at 22:37

## 2022-09-28 RX ADMIN — Medication 50 MILLIGRAM(S): at 17:43

## 2022-09-28 RX ADMIN — Medication 30 MILLIGRAM(S): at 10:05

## 2022-09-28 RX ADMIN — CEFTRIAXONE 100 MILLIGRAM(S): 500 INJECTION, POWDER, FOR SOLUTION INTRAMUSCULAR; INTRAVENOUS at 04:16

## 2022-09-28 RX ADMIN — Medication 30 MILLIGRAM(S): at 04:14

## 2022-09-28 RX ADMIN — Medication 2: at 17:43

## 2022-09-28 NOTE — PATIENT PROFILE ADULT - NSPROMEDSADMININFO_GEN_A_NUR
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 no concerns

## 2022-09-28 NOTE — ED ADULT NURSE REASSESSMENT NOTE - NS ED NURSE REASSESS COMMENT FT1
Pt. remains in stable condition, able to make some needs known, Urdu speaking. No s/sx resp. distress/pain.  , lantus as ordered. Pt. ambulatory/cont. Report given to 3E, awaiting transport.

## 2022-09-28 NOTE — PATIENT PROFILE ADULT - FUNCTIONAL ASSESSMENT - DAILY ACTIVITY 1.
Physical Therapy Rehab Treatment Note  Facility/Department: [facility]  Room: [email or web address]-       NAME: Izzy Lopez  : 1939 (78 y.o.)  MRN: [id number]  CODE STATUS: Full Code    Date of Service: 2020  Chart Reviewed: Yes  Family / Caregiver Present: No  Diagnosis: Impaired mobility/ ADLs secondary to exacerbation of Parkinson's Disease    Restrictions:  Restrictions/Precautions: Fall Risk       SUBJECTIVE: Subjective: I am so tired today  Response To Previous Treatment: Patient with no complaints from previous session. Pain Screening  Patient Currently in Pain: Yes  Pre Treatment Pain Screening  Scale Used: Numeric Score  Intervention List: Patient able to continue with treatment  Comments / Details: Generalized pain    Post Treatment Pain Screening:  Pain Assessment  Pain Assessment: 0-10    OBJECTIVE:   Follows Commands: Within Functional Limits    Bed mobility  Rolling to Left: Supervision  Rolling to Right: Supervision  Supine to Sit: Unable to assess  Sit to Supine: Supervision    Transfers  Sit to Stand: Supervision  Stand to sit: Supervision  Bed to Chair: Stand by assistance    Ambulation  Ambulation?: Yes  More Ambulation?: No  Ambulation 1  Surface: carpet  Device: Rollator  Quality of Gait: slow cayden with decreased step length. Distances limited by fatigue. Distance: 100'    Stairs/Curb  Stairs?: No    Exercises  Hip Flexion: x10  Hip Abduction: Hip ADD into pillow x10  Knee Long Arc Quad: x20  Ankle Pumps: x10     ASSESSMENT/COMMENTS:  Assessment: Patient with increased fatigue in p.m. limiting endurance and functional mobility seated exercise performed to improve LE strength and endurance    PLAN OF CARE/Safety:   Plan Comment: Cont per POC. Safety Devices  Type of devices: Bed alarm in place;Call light within reach; Left in bed      Therapy Time:   Individual   Time In 1452   Time Out 1522   Minutes 30     Minutes: 30      Transfer/Bed mobility trainin      Gait training: 10 Therapeutic ex: [address], FLY, 07/30/20 at 3:05 PM 4 = No assist / stand by assistance

## 2022-09-28 NOTE — PROGRESS NOTE ADULT - SUBJECTIVE AND OBJECTIVE BOX
History of Present Illness:   · 57 y/o male with a PMHx of asthma, COVID, HTN, HLD, DM type II, presents to the ED c/o productive cough x2 weeks and dyspnea. He states that his dyspnea has gotten worse over the past few days. His cough is productive of yellow sputum  Patient was found to have Covid, Rt lobar Pna and new onset Afib.    9.28: c/o coughing, no n/v/d, no fever/chills     REVIEW OF SYSTEMS:    CONSTITUTIONAL: No weakness, No fevers or chills  ENT: No ear ache, No sorethroat  NECK: No pain, No stiffness  RESPIRATORY: ++ cough, No wheezing, No hemoptysis; +dyspnea  CARDIOVASCULAR: No chest pain, No palpitations  GASTROINTESTINAL: No abd pain, No nausea, No vomiting, No hematemesis, No diarrhea or constipation. No melena, No hematochezia.  GENITOURINARY: No dysuria, No  hematuria  NEUROLOGICAL: No diplopia, No paresthesia, No motor dysfunction  MUSCULOSKELETAL: No arthralgia, No myalgia  SKIN: No rashes, or lesions   PSYCH: no anxiety, no suicidal ideation    All other review of systems is negative unless indicated above    Vital Signs Last 24 Hrs  T(C): 36.6 (28 Sep 2022 07:28), Max: 97.2 (27 Sep 2022 20:43)  T(F): 97.8 (28 Sep 2022 07:28), Max: 206.9 (27 Sep 2022 20:43)  HR: 102 (28 Sep 2022 07:28) (80 - 106)  BP: 124/75 (28 Sep 2022 07:28) (124/53 - 144/82)  RR: 18 (28 Sep 2022 07:28) (17 - 20)  SpO2: 98% (28 Sep 2022 07:28) (96% - 100%)    Parameters below as of 28 Sep 2022 07:28  Patient On (Oxygen Delivery Method): room air      PHYSICAL EXAM:    GENERAL: NAD  HEENT:  NC/AT, EOMI, PERRLA, No scleral icterus, Moist mucous membranes  NECK: Supple, No JVD  CNS:  Alert & Oriented X3, Motor Strength 5/5 B/L upper and lower extremities; DTRs 2+ intact   LUNG: Normal Breath sounds, bilateral rhonchi and rales Rt>Lt  HEART: RRR; No murmurs, No rubs  ABDOMEN: +BS, ST/ND/NT  GENITOURINARY: Voiding, Bladder not distended  EXTREMITIES:  2+ Peripheral Pulses, No clubbing, No cyanosis, No tibial edema  MUSCULOSKELTAL: Joints normal ROM, No TTP, No effusion  VAGINAL: deferred  SKIN: no rashes  RECTAL: deferred, not indicated  BREAST: deferred    a/p:    1. RT lobar Pna:  likely bacterial superimposed on Covid Uri  Ceftriaxone/Zithromax d#2  Decadron IV  Albuterol/Mucinex  Blood Cx    2. New onset Afib:  Cardizem po , increase to 60 mg Qid  add Lopressor per cardiology   TTE  Apixaban Bid  Cardiology evaluation   Telemetry monitoring    3. DM type II:  ADA  ISS      Full Code

## 2022-09-29 PROCEDURE — 99233 SBSQ HOSP IP/OBS HIGH 50: CPT

## 2022-09-29 PROCEDURE — 93306 TTE W/DOPPLER COMPLETE: CPT | Mod: 26

## 2022-09-29 RX ADMIN — Medication 60 MILLIGRAM(S): at 06:12

## 2022-09-29 RX ADMIN — Medication 60 MILLIGRAM(S): at 17:29

## 2022-09-29 RX ADMIN — Medication 4: at 17:17

## 2022-09-29 RX ADMIN — Medication 50 MILLIGRAM(S): at 09:50

## 2022-09-29 RX ADMIN — ATORVASTATIN CALCIUM 20 MILLIGRAM(S): 80 TABLET, FILM COATED ORAL at 22:09

## 2022-09-29 RX ADMIN — Medication 60 MILLIGRAM(S): at 00:50

## 2022-09-29 RX ADMIN — APIXABAN 5 MILLIGRAM(S): 2.5 TABLET, FILM COATED ORAL at 09:49

## 2022-09-29 RX ADMIN — LOSARTAN POTASSIUM 50 MILLIGRAM(S): 100 TABLET, FILM COATED ORAL at 09:49

## 2022-09-29 RX ADMIN — Medication 600 MILLIGRAM(S): at 09:49

## 2022-09-29 RX ADMIN — Medication 60 MILLIGRAM(S): at 12:41

## 2022-09-29 RX ADMIN — APIXABAN 5 MILLIGRAM(S): 2.5 TABLET, FILM COATED ORAL at 22:10

## 2022-09-29 RX ADMIN — Medication 600 MILLIGRAM(S): at 22:09

## 2022-09-29 RX ADMIN — CEFTRIAXONE 100 MILLIGRAM(S): 500 INJECTION, POWDER, FOR SOLUTION INTRAMUSCULAR; INTRAVENOUS at 04:32

## 2022-09-29 RX ADMIN — Medication 6 MILLIGRAM(S): at 09:50

## 2022-09-29 RX ADMIN — AZITHROMYCIN 500 MILLIGRAM(S): 500 TABLET, FILM COATED ORAL at 09:49

## 2022-09-29 NOTE — PROGRESS NOTE ADULT - SUBJECTIVE AND OBJECTIVE BOX
HPI:  · 55 y/o male with a PMHx of asthma, COVID, HTN, HLD, DM type II, presents to the ED c/o productive cough x2 weeks and dyspnea. He states that his dyspnea has gotten worse over the past few days. His cough is productive of yellow sputum  Patient was found to have Covid, Rt lobar Pna and new onset Afib. Cardiology called to evaluate arrhythmia. Pt denies any exertional symptoms prior to hospitalization. He denies hx of cardiac disease.     9/29/'22: no complaints    MEDICATIONS:  OUTPATIENT  Home Medications:  amLODIPine 10 mg oral tablet: 1 tab(s) orally once a day (27 Sep 2022 17:53)  atorvastatin 20 mg oral tablet: 1 tab(s) orally once a day (at bedtime) (27 Sep 2022 17:53)  losartan-hydrochlorothiazide 100 mg-25 mg oral tablet: 1 tab(s) orally once a day (in the afternoon) (27 Sep 2022 17:53)  meloxicam 7.5 mg oral tablet: 1 tab(s) orally once a day, As Needed (27 Sep 2022 17:53)  metFORMIN 500 mg oral tablet: 1 tab(s) orally once a day (27 Sep 2022 17:53)  Voltaren 1% topical gel: Apply topically to affected area once a day, As Needed (27 Sep 2022 17:53)      INPATIENT  MEDICATIONS  (STANDING):  apixaban 5 milliGRAM(s) Oral every 12 hours  atorvastatin 20 milliGRAM(s) Oral at bedtime  azithromycin   Tablet 500 milliGRAM(s) Oral daily  cefTRIAXone   IVPB 1000 milliGRAM(s) IV Intermittent every 24 hours  dexAMETHasone  Injectable 6 milliGRAM(s) IV Push daily  dextrose 5%. 1000 milliLiter(s) (100 mL/Hr) IV Continuous <Continuous>  dextrose 5%. 1000 milliLiter(s) (50 mL/Hr) IV Continuous <Continuous>  dextrose 50% Injectable 25 Gram(s) IV Push once  dextrose 50% Injectable 12.5 Gram(s) IV Push once  dextrose 50% Injectable 25 Gram(s) IV Push once  diltiazem    Tablet 60 milliGRAM(s) Oral every 6 hours  glucagon  Injectable 1 milliGRAM(s) IntraMuscular once  guaiFENesin  milliGRAM(s) Oral every 12 hours  insulin glargine Injectable (LANTUS) 10 Unit(s) SubCutaneous at bedtime  insulin lispro (ADMELOG) corrective regimen sliding scale   SubCutaneous three times a day before meals  losartan 50 milliGRAM(s) Oral daily  metoprolol succinate ER 50 milliGRAM(s) Oral daily    MEDICATIONS  (PRN):  ALBUTerol    90 MICROgram(s) HFA Inhaler 2 Puff(s) Inhalation every 4 hours PRN Shortness of Breath and/or Wheezing  dextrose Oral Gel 15 Gram(s) Oral once PRN Blood Glucose LESS THAN 70 milliGRAM(s)/deciliter          Vital Signs Last 24 Hrs  T(C): 36.8 (29 Sep 2022 19:50), Max: 36.8 (29 Sep 2022 19:50)  T(F): 98.2 (29 Sep 2022 19:50), Max: 98.2 (29 Sep 2022 19:50)  HR: 67 (29 Sep 2022 19:50) (67 - 76)  BP: 125/75 (29 Sep 2022 19:50) (106/81 - 132/82)  BP(mean): 94 (29 Sep 2022 00:00) (94 - 94)  RR: 18 (29 Sep 2022 19:50) (18 - 18)  SpO2: 96% (29 Sep 2022 19:50) (96% - 99%)    Parameters below as of 29 Sep 2022 19:50  Patient On (Oxygen Delivery Method): room air    Daily     Daily I&O's Summary      I&O's Detail      I&O's Summary      PHYSICAL EXAM:    Constitutional: NAD, awake and alert, well-developed  HEENT: PERR, EOMI,  No oral cyanosis.  Neck:  supple,  No JVD  Respiratory: Breath sounds are clear bilaterally, No wheezing, rales or rhonchi  Cardiovascular: S1 and S2, regular rate and rhythm, no Murmurs, gallops or rubs  Gastrointestinal: Bowel Sounds present, soft, nontender.   Extremities: No peripheral edema. No clubbing or cyanosis.  Vascular: 2+ peripheral pulses  Neurological: A/O x 3, no focal deficits  Musculoskeletal: no calf tenderness.  Skin: No rashes.      ===============================  ===============================  LABS:                         16.0   28.12 )-----------( 296      ( 28 Sep 2022 04:27 )             47.3                         15.9   35.99 )-----------( 294      ( 27 Sep 2022 10:47 )             47.2     28 Sep 2022 04:27    140    |  108    |  21     ----------------------------<  219    3.4     |  24     |  0.93   27 Sep 2022 10:47    140    |  106    |  15     ----------------------------<  120    3.6     |  28     |  0.91     Ca    8.9        28 Sep 2022 04:27  Ca    9.1        27 Sep 2022 10:47    TPro  6.5    /  Alb  3.3    /  TBili  1.3    /  DBili  x      /  AST  14     /  ALT  57     /  AlkPhos  77     28 Sep 2022 04:27  TPro  6.7    /  Alb  3.7    /  TBili  1.4    /  DBili  x      /  AST  20     /  ALT  70     /  AlkPhos  78     27 Sep 2022 10:47      ===============================  ===============================  CARDIAC BIOMARKERS:  BNP  Serum Pro-Brain Natriuretic Peptide: 1371 pg/mL *H* [0 - 125] (09-27-22 @ 10:47)  Serum Pro-Brain Natriuretic Peptide: 212 pg/mL *H* [0 - 125] (12-15-21 @ 09:34)      TROPONIN  Troponin I, High Sensitivity Result: 4.52 ng/L (09-28-22 @ 04:27)  Troponin I, High Sensitivity Result: 5.67 ng/L (09-27-22 @ 20:13)  Troponin I, High Sensitivity Result: 5.96 ng/L (09-27-22 @ 15:27)  Troponin I, High Sensitivity Result: 5.72 ng/L (09-27-22 @ 10:47)  Troponin I, High Sensitivity Result: 9.45 ng/L (12-15-21 @ 09:34)  Troponin I, Serum: <0.015 ng/mL [0.015 - 0.045] (03-31-20 @ 07:16)    ===============================  ===============================  BLOOD CULTURES:    Blood Culture: Organism --  Gram Stain Blood -- Gram Stain --  Specimen Source .Blood None  Culture-Blood --      09-27 @ 10:47  Pro Bnp 1371      ===============================  ===============================    RADIOLOGY/EKG:< from: 12 Lead ECG (09.27.22 @ 12:49) >  Diagnosis Line Atrial fibrillation with rapid ventricular response with premature ventricular or aberrantly conducted complexes  Nonspecific T wave abnormality    When compared with ECG of 27-SEP-2022 12:48,  Normal sinus rhythm seen then      Confirmed by RENETTA TANG MD ([id number]) on 9/27/2022 5:41:45 PM    < end of copied text >      e< from: TTE Echo Complete w/o Contrast w/ Doppler (09.29.22 @ 12:13) >     Impression     Summary     The mitral valve leaflets appear thickened.   Moderate (2+) mitral regurgitation is present.   Mild aortic sclerosis is present with normal valvular opening.   The tricuspid valve leaflets are thin and pliable; valve motion is   normal.   Moderate (2+)tricuspid valve regurgitation is present.   Mild pulmonary hypertension.   Pulmonic valve not well seen.   Trace pulmonic valvular regurgitation is present.   The left atrium is severely dilated.   Left ventricle systolic function appears preserved in the presence of a   cardiac arrhythmia. Left ventricle size and structure are within normal   limitations. Visual estimation of left ventricle ejection fraction is   50-55%.   A false tendon is noted near the left ventricular apex.   The right atrium appears moderately to severely dilated.   The right ventricle is mildly dilated.   IVC is dilated and not collapsing with inspiration.     Signature     ----------------------------------------------------------------   Electronically signed by Kanchan Bryant MD(Interpreting   physician) on 09/29/2022 09:32 PM   ----------------------------------------------------------------    < end of copied text >    ===============================    Abdulaziz Gayle M.D.  Cardiology, [facility]  Cell: [phone]  Offices:    ([facility] Office)  [phone] ([facility] Office)

## 2022-09-29 NOTE — PROGRESS NOTE ADULT - PROBLEM SELECTOR PLAN 1
Rates controlled on BB. Echo w/ normal EF.  When clinically improved will consider ischemic workup given new onset afib.

## 2022-09-29 NOTE — PROGRESS NOTE ADULT - SUBJECTIVE AND OBJECTIVE BOX
History of Present Illness:   · 55 y/o male with a PMHx of asthma, COVID, HTN, HLD, DM type II, presents to the ED c/o productive cough x2 weeks and dyspnea. He states that his dyspnea has gotten worse over the past few days. His cough is productive of yellow sputum  Patient was found to have Covid, Rt lobar Pna and new onset Afib.    9.28: c/o coughing, no n/v/d, no fever/chills   9.29: c/o coughing, no cp, no sob  Tele: Afib     REVIEW OF SYSTEMS:    CONSTITUTIONAL: No weakness, No fevers or chills  ENT: No ear ache, No sorethroat  NECK: No pain, No stiffness  RESPIRATORY: ++ cough, No wheezing, No hemoptysis; +dyspnea  CARDIOVASCULAR: No chest pain, No palpitations  GASTROINTESTINAL: No abd pain, No nausea, No vomiting, No hematemesis, No diarrhea or constipation. No melena, No hematochezia.  GENITOURINARY: No dysuria, No  hematuria  NEUROLOGICAL: No diplopia, No paresthesia, No motor dysfunction  MUSCULOSKELETAL: No arthralgia, No myalgia  SKIN: No rashes, or lesions   PSYCH: no anxiety, no suicidal ideation    All other review of systems is negative unless indicated above    Vital Signs Last 24 Hrs  T(C): 36.3 (29 Sep 2022 07:55), Max: 36.6 (28 Sep 2022 19:50)  T(F): 97.3 (29 Sep 2022 07:55), Max: 97.9 (28 Sep 2022 19:50)  HR: 71 (29 Sep 2022 12:26) (71 - 87)  BP: 114/78 (29 Sep 2022 12:26) (106/81 - 132/82)  BP(mean): 94 (29 Sep 2022 00:00) (94 - 94)  RR: 18 (29 Sep 2022 07:55) (18 - 18)  SpO2: 99% (29 Sep 2022 07:55) (98% - 99%)    Parameters below as of 29 Sep 2022 07:55  Patient On (Oxygen Delivery Method): room air            PHYSICAL EXAM:    GENERAL: NAD  HEENT:  NC/AT, EOMI, PERRLA, No scleral icterus, Moist mucous membranes  NECK: Supple, No JVD  CNS:  Alert & Oriented X3, Motor Strength 5/5 B/L upper and lower extremities; DTRs 2+ intact   LUNG: Normal Breath sounds, bilateral rhonchi and rales Rt>Lt  HEART: RRR; No murmurs, No rubs  ABDOMEN: +BS, ST/ND/NT  GENITOURINARY: Voiding, Bladder not distended  EXTREMITIES:  2+ Peripheral Pulses, No clubbing, No cyanosis, No tibial edema  MUSCULOSKELTAL: Joints normal ROM, No TTP, No effusion  VAGINAL: deferred  SKIN: no rashes  RECTAL: deferred, not indicated  BREAST: deferred    a/p:    1. RT lobar Pna:  likely bacterial superimposed on Covid Uri  Ceftriaxone/Zithromax d#3  Decadron IV  Albuterol/Mucinex  Blood Cx    2. New onset Afib:  Cardizem po , increase to 60 mg Qid  add Lopressor per cardiology   TTE  Apixaban Bid  Cardiology evaluation   Telemetry monitoring    3. DM type II:  ADA  ISS      Full Code

## 2022-09-30 DIAGNOSIS — U07.1 COVID-19: ICD-10-CM

## 2022-09-30 DIAGNOSIS — I10 ESSENTIAL (PRIMARY) HYPERTENSION: ICD-10-CM

## 2022-09-30 LAB
ANION GAP SERPL CALC-SCNC: 7 MMOL/L — SIGNIFICANT CHANGE UP (ref 5–17)
BUN SERPL-MCNC: 22 MG/DL — SIGNIFICANT CHANGE UP (ref 7–23)
CALCIUM SERPL-MCNC: 8.9 MG/DL — SIGNIFICANT CHANGE UP (ref 8.5–10.1)
CHLORIDE SERPL-SCNC: 109 MMOL/L — HIGH (ref 96–108)
CO2 SERPL-SCNC: 24 MMOL/L — SIGNIFICANT CHANGE UP (ref 22–31)
CREAT SERPL-MCNC: 0.63 MG/DL — SIGNIFICANT CHANGE UP (ref 0.5–1.3)
EGFR: 110 ML/MIN/1.73M2 — SIGNIFICANT CHANGE UP
GLUCOSE SERPL-MCNC: 134 MG/DL — HIGH (ref 70–99)
HCT VFR BLD CALC: 43.9 % — SIGNIFICANT CHANGE UP (ref 39–50)
HGB BLD-MCNC: 14.8 G/DL — SIGNIFICANT CHANGE UP (ref 13–17)
MCHC RBC-ENTMCNC: 29.2 PG — SIGNIFICANT CHANGE UP (ref 27–34)
MCHC RBC-ENTMCNC: 33.7 GM/DL — SIGNIFICANT CHANGE UP (ref 32–36)
MCV RBC AUTO: 86.8 FL — SIGNIFICANT CHANGE UP (ref 80–100)
PLATELET # BLD AUTO: 260 K/UL — SIGNIFICANT CHANGE UP (ref 150–400)
POTASSIUM SERPL-MCNC: 3.9 MMOL/L — SIGNIFICANT CHANGE UP (ref 3.5–5.3)
POTASSIUM SERPL-SCNC: 3.9 MMOL/L — SIGNIFICANT CHANGE UP (ref 3.5–5.3)
RBC # BLD: 5.06 M/UL — SIGNIFICANT CHANGE UP (ref 4.2–5.8)
RBC # FLD: 14.3 % — SIGNIFICANT CHANGE UP (ref 10.3–14.5)
SODIUM SERPL-SCNC: 140 MMOL/L — SIGNIFICANT CHANGE UP (ref 135–145)
WBC # BLD: 23.93 K/UL — HIGH (ref 3.8–10.5)
WBC # FLD AUTO: 23.93 K/UL — HIGH (ref 3.8–10.5)

## 2022-09-30 PROCEDURE — 99232 SBSQ HOSP IP/OBS MODERATE 35: CPT

## 2022-09-30 PROCEDURE — 99233 SBSQ HOSP IP/OBS HIGH 50: CPT

## 2022-09-30 RX ORDER — DILTIAZEM HCL 120 MG
240 CAPSULE, EXT RELEASE 24 HR ORAL DAILY
Refills: 0 | Status: DISCONTINUED | OUTPATIENT
Start: 2022-09-30 | End: 2022-10-05

## 2022-09-30 RX ORDER — APIXABAN 2.5 MG/1
1 TABLET, FILM COATED ORAL
Qty: 60 | Refills: 0
Start: 2022-09-30

## 2022-09-30 RX ADMIN — Medication 60 MILLIGRAM(S): at 12:46

## 2022-09-30 RX ADMIN — Medication 600 MILLIGRAM(S): at 09:55

## 2022-09-30 RX ADMIN — LOSARTAN POTASSIUM 50 MILLIGRAM(S): 100 TABLET, FILM COATED ORAL at 09:55

## 2022-09-30 RX ADMIN — APIXABAN 5 MILLIGRAM(S): 2.5 TABLET, FILM COATED ORAL at 21:39

## 2022-09-30 RX ADMIN — AZITHROMYCIN 500 MILLIGRAM(S): 500 TABLET, FILM COATED ORAL at 09:54

## 2022-09-30 RX ADMIN — Medication 2: at 17:14

## 2022-09-30 RX ADMIN — APIXABAN 5 MILLIGRAM(S): 2.5 TABLET, FILM COATED ORAL at 09:54

## 2022-09-30 RX ADMIN — Medication 600 MILLIGRAM(S): at 21:38

## 2022-09-30 RX ADMIN — CEFTRIAXONE 100 MILLIGRAM(S): 500 INJECTION, POWDER, FOR SOLUTION INTRAMUSCULAR; INTRAVENOUS at 03:57

## 2022-09-30 RX ADMIN — Medication 60 MILLIGRAM(S): at 00:05

## 2022-09-30 RX ADMIN — Medication 60 MILLIGRAM(S): at 05:22

## 2022-09-30 RX ADMIN — ATORVASTATIN CALCIUM 20 MILLIGRAM(S): 80 TABLET, FILM COATED ORAL at 21:39

## 2022-09-30 RX ADMIN — Medication 50 MILLIGRAM(S): at 09:55

## 2022-09-30 NOTE — PROGRESS NOTE ADULT - SUBJECTIVE AND OBJECTIVE BOX
History of Present Illness:   · 55 y/o male with a PMHx of asthma, COVID, HTN, HLD, DM type II, presents to the ED c/o productive cough x2 weeks and dyspnea. He states that his dyspnea has gotten worse over the past few days. His cough is productive of yellow sputum  Patient was found to have Covid, Rt lobar Pna and new onset Afib.    9.28: c/o coughing, no n/v/d, no fever/chills   9.29: c/o coughing, no cp, no sob  Tele: Afib   9.30: +coughing, Tele: Afib 80/min    REVIEW OF SYSTEMS:    CONSTITUTIONAL: No weakness, No fevers or chills  ENT: No ear ache, No sorethroat  NECK: No pain, No stiffness  RESPIRATORY: ++ cough, No wheezing, No hemoptysis; +dyspnea  CARDIOVASCULAR: No chest pain, No palpitations  GASTROINTESTINAL: No abd pain, No nausea, No vomiting, No hematemesis, No diarrhea or constipation. No melena, No hematochezia.  GENITOURINARY: No dysuria, No  hematuria  NEUROLOGICAL: No diplopia, No paresthesia, No motor dysfunction  MUSCULOSKELETAL: No arthralgia, No myalgia  SKIN: No rashes, or lesions   PSYCH: no anxiety, no suicidal ideation    All other review of systems is negative unless indicated above    Vital Signs Last 24 Hrs  T(C): 36.5 (30 Sep 2022 07:20), Max: 36.8 (29 Sep 2022 19:50)  T(F): 97.7 (30 Sep 2022 07:20), Max: 98.2 (29 Sep 2022 19:50)  HR: 67 (30 Sep 2022 12:48) (67 - 87)  BP: 120/84 (30 Sep 2022 12:48) (112/81 - 128/74)  RR: 18 (30 Sep 2022 07:20) (18 - 18)  SpO2: 98% (30 Sep 2022 07:20) (96% - 98%)    Parameters below as of 30 Sep 2022 07:20  Patient On (Oxygen Delivery Method): room air          PHYSICAL EXAM:    GENERAL: NAD  HEENT:  NC/AT, EOMI, PERRLA, No scleral icterus, Moist mucous membranes  NECK: Supple, No JVD  CNS:  Alert & Oriented X3, Motor Strength 5/5 B/L upper and lower extremities; DTRs 2+ intact   LUNG: Normal Breath sounds, bilateral rhonchi and rales Rt>Lt  HEART: RRR; No murmurs, No rubs  ABDOMEN: +BS, ST/ND/NT  GENITOURINARY: Voiding, Bladder not distended  EXTREMITIES:  2+ Peripheral Pulses, No clubbing, No cyanosis, No tibial edema  MUSCULOSKELTAL: Joints normal ROM, No TTP, No effusion  VAGINAL: deferred  SKIN: no rashes  RECTAL: deferred, not indicated  BREAST: deferred    MEDICATIONS  (STANDING):  apixaban 5 milliGRAM(s) Oral every 12 hours  atorvastatin 20 milliGRAM(s) Oral at bedtime  azithromycin   Tablet 500 milliGRAM(s) Oral daily  cefTRIAXone   IVPB 1000 milliGRAM(s) IV Intermittent every 24 hours  diltiazem    Tablet 60 milliGRAM(s) Oral every 6 hours  glucagon  Injectable 1 milliGRAM(s) IntraMuscular once  guaiFENesin  milliGRAM(s) Oral every 12 hours  insulin glargine Injectable (LANTUS) 10 Unit(s) SubCutaneous at bedtime  insulin lispro (ADMELOG) corrective regimen sliding scale   SubCutaneous three times a day before meals  losartan 50 milliGRAM(s) Oral daily  metoprolol succinate ER 50 milliGRAM(s) Oral daily    a/p:    1. RT lobar Pna:  likely bacterial superimposed on Covid Uri  Ceftriaxone/Zithromax d#4/5  d/c decadron   Albuterol/Mucinex  Blood Cx    2. New onset Afib:  Cardizem po , will change to Cardizem CD 240mg/day  add Lopressor per cardiology   TTE: normal EF  Apixaban Bid  Cardiology evaluation   Telemetry monitoring    3. DM type II:  ADA  ISS      Full Code Complex Repair And Melolabial Flap Text: The defect edges were debeveled with a #15 scalpel blade.  The primary defect was closed partially with a complex linear closure.  Given the location of the remaining defect, shape of the defect and the proximity to free margins a melolabial flap was deemed most appropriate for complete closure of the defect.  Using a sterile surgical marker, an appropriate advancement flap was drawn incorporating the defect and placing the expected incisions within the relaxed skin tension lines where possible.    The area thus outlined was incised deep to adipose tissue with a #15 scalpel blade.  The skin margins were undermined to an appropriate distance in all directions utilizing iris scissors.

## 2022-09-30 NOTE — PROGRESS NOTE ADULT - PROBLEM SELECTOR PLAN 1
Recommendation:  New onset possibly in the setting of infection, Rates controlled; continue BB and AC. Echo w/ normal EF.  Consider ischemic w/u when clinically improved    Patient reports "streak of blood in sputum" last night.  Continue AC and closely monitor.  H&H stable

## 2022-09-30 NOTE — PROGRESS NOTE ADULT - NS ATTEND AMEND GEN_ALL_CORE FT
Patient with hx HTN develooped atrial fibrillation in setting of acute covid related , pneumonia , now rate is controlled , normal ventricular systolic function , continue BB , cardizem , anticoagulation ,

## 2022-09-30 NOTE — PHARMACOTHERAPY INTERVENTION NOTE - COMMENTS
as per [facility] pharmacy copay for one month supply of Eliquis is $0
Medication reconciliation completed.  Reviewed Medication list and confirmed med allergies with patient; confirmed with Dr. First Medneeraj. 
no

## 2022-09-30 NOTE — PROGRESS NOTE ADULT - SUBJECTIVE AND OBJECTIVE BOX
HPI:  · 57 y/o male with a PMHx of asthma, COVID, HTN, HLD, DM type II, presents to the ED c/o productive cough x2 weeks and dyspnea. He states that his dyspnea has gotten worse over the past few days. His cough is productive of yellow sputum  Patient was found to have Covid, Rt lobar Pna and new onset Afib. Cardiology called to evaluate arrhythmia. Pt denies any exertional symptoms prior to hospitalization. He denies hx of cardiac disease.     9/29/'22: no complaints  9/30/22: Denies chest pain or shortness of breath. +cough yellow/white sputum (streak of blood last night); Tele: afib 70's; 40's during sleep    MEDICATIONS:  OUTPATIENT  MEDICATIONS  (STANDING):  apixaban 5 milliGRAM(s) Oral every 12 hours  atorvastatin 20 milliGRAM(s) Oral at bedtime  azithromycin   Tablet 500 milliGRAM(s) Oral daily  cefTRIAXone   IVPB 1000 milliGRAM(s) IV Intermittent every 24 hours  dextrose 5%. 1000 milliLiter(s) (100 mL/Hr) IV Continuous <Continuous>  dextrose 5%. 1000 milliLiter(s) (50 mL/Hr) IV Continuous <Continuous>  dextrose 50% Injectable 25 Gram(s) IV Push once  dextrose 50% Injectable 12.5 Gram(s) IV Push once  dextrose 50% Injectable 25 Gram(s) IV Push once  diltiazem    Tablet 60 milliGRAM(s) Oral every 6 hours  glucagon  Injectable 1 milliGRAM(s) IntraMuscular once  guaiFENesin  milliGRAM(s) Oral every 12 hours  insulin glargine Injectable (LANTUS) 10 Unit(s) SubCutaneous at bedtime  insulin lispro (ADMELOG) corrective regimen sliding scale   SubCutaneous three times a day before meals  losartan 50 milliGRAM(s) Oral daily  metoprolol succinate ER 50 milliGRAM(s) Oral daily    MEDICATIONS  (PRN):  ALBUTerol    90 MICROgram(s) HFA Inhaler 2 Puff(s) Inhalation every 4 hours PRN Shortness of Breath and/or Wheezing  dextrose Oral Gel 15 Gram(s) Oral once PRN Blood Glucose LESS THAN 70 milliGRAM(s)/deciliter  Home Medications:  amLODIPine 10 mg oral tablet: 1 tab(s) orally once a day (27 Sep 2022 17:53)  atorvastatin 20 mg oral tablet: 1 tab(s) orally once a day (at bedtime) (27 Sep 2022 17:53)  losartan-hydrochlorothiazide 100 mg-25 mg oral tablet: 1 tab(s) orally once a day (in the afternoon) (27 Sep 2022 17:53)  meloxicam 7.5 mg oral tablet: 1 tab(s) orally once a day, As Needed (27 Sep 2022 17:53)  metFORMIN 500 mg oral tablet: 1 tab(s) orally once a day (27 Sep 2022 17:53)  Voltaren 1% topical gel: Apply topically to affected area once a day, As Needed (27 Sep 2022 17:53)    Vital Signs Last 24 Hrs  T(C): 36.5 (30 Sep 2022 07:20), Max: 36.8 (29 Sep 2022 19:50)  T(F): 97.7 (30 Sep 2022 07:20), Max: 98.2 (29 Sep 2022 19:50)  HR: 68 (30 Sep 2022 07:20) (67 - 87)  BP: 126/73 (30 Sep 2022 07:20) (112/81 - 128/74)  BP(mean): --  RR: 18 (30 Sep 2022 07:20) (18 - 18)  SpO2: 98% (30 Sep 2022 07:20) (96% - 98%)    Parameters below as of 30 Sep 2022 07:20  Patient On (Oxygen Delivery Method): room air      Daily     Daily I&O's Summary      I&O's Detail      I&O's Summary      PHYSICAL EXAM:    Constitutional: NAD, awake and alert, well-developed  HEENT: PERR, EOMI,  No oral cyanosis.  Neck:  supple,  No JVD  Respiratory: Breath sounds are clear bilaterally, No wheezing, rales or rhonchi  Cardiovascular: S1 and S2, regular rate and rhythm, no Murmurs, gallops or rubs  Gastrointestinal: Bowel Sounds present, soft, nontender.   Extremities: No peripheral edema. No clubbing or cyanosis.  Vascular: 2+ peripheral pulses  Neurological: A/O x 3, no focal deficits  Musculoskeletal: no calf tenderness.  Skin: No rashes.      ===============================  ===============================  LABS:                           14.8   23.93 )-----------( 260      ( 30 Sep 2022 07:36 )             43.9                           16.0   28.12 )-----------( 296      ( 28 Sep 2022 04:27 )             47.3                         15.9   35.99 )-----------( 294      ( 27 Sep 2022 10:47 )             47.2     09-30    140  |  109<H>  |  22  ----------------------------<  134<H>  3.9   |  24  |  0.63    Ca    8.9      30 Sep 2022 07:36        28 Sep 2022 04:27    140    |  108    |  21     ----------------------------<  219    3.4     |  24     |  0.93   27 Sep 2022 10:47    140    |  106    |  15     ----------------------------<  120    3.6     |  28     |  0.91     Ca    8.9        28 Sep 2022 04:27  Ca    9.1        27 Sep 2022 10:47    TPro  6.5    /  Alb  3.3    /  TBili  1.3    /  DBili  x      /  AST  14     /  ALT  57     /  AlkPhos  77     28 Sep 2022 04:27  TPro  6.7    /  Alb  3.7    /  TBili  1.4    /  DBili  x      /  AST  20     /  ALT  70     /  AlkPhos  78     27 Sep 2022 10:47      ===============================  ===============================  CARDIAC BIOMARKERS:  BNP  Serum Pro-Brain Natriuretic Peptide: 1371 pg/mL *H* [0 - 125] (09-27-22 @ 10:47)  Serum Pro-Brain Natriuretic Peptide: 212 pg/mL *H* [0 - 125] (12-15-21 @ 09:34)      TROPONIN  Troponin I, High Sensitivity Result: 4.52 ng/L (09-28-22 @ 04:27)  Troponin I, High Sensitivity Result: 5.67 ng/L (09-27-22 @ 20:13)  Troponin I, High Sensitivity Result: 5.96 ng/L (09-27-22 @ 15:27)  Troponin I, High Sensitivity Result: 5.72 ng/L (09-27-22 @ 10:47)  Troponin I, High Sensitivity Result: 9.45 ng/L (12-15-21 @ 09:34)  Troponin I, Serum: <0.015 ng/mL [0.015 - 0.045] (03-31-20 @ 07:16)    ===============================  ===============================  BLOOD CULTURES:    Blood Culture: Organism --  Gram Stain Blood -- Gram Stain --  Specimen Source .Blood None  Culture-Blood --      09-27 @ 10:47  Pro Bnp 1371      ===============================  ===============================    RADIOLOGY/EKG:< from: 12 Lead ECG (09.27.22 @ 12:49) >  Diagnosis Line Atrial fibrillation with rapid ventricular response with premature ventricular or aberrantly conducted complexes  Nonspecific T wave abnormality    When compared with ECG of 27-SEP-2022 12:48,  Normal sinus rhythm seen then      Confirmed by RENETTA TANG MD ([id number]) on 9/27/2022 5:41:45 PM    < end of copied text >      e< from: TTE Echo Complete w/o Contrast w/ Doppler (09.29.22 @ 12:13) >     Impression     Summary     The mitral valve leaflets appear thickened.   Moderate (2+) mitral regurgitation is present.   Mild aortic sclerosis is present with normal valvular opening.   The tricuspid valve leaflets are thin and pliable; valve motion is   normal.   Moderate (2+)tricuspid valve regurgitation is present.   Mild pulmonary hypertension.   Pulmonic valve not well seen.   Trace pulmonic valvular regurgitation is present.   The left atrium is severely dilated.   Left ventricle systolic function appears preserved in the presence of a   cardiac arrhythmia. Left ventricle size and structure are within normal   limitations. Visual estimation of left ventricle ejection fraction is   50-55%.   A false tendon is noted near the left ventricular apex.   The right atrium appears moderately to severely dilated.   The right ventricle is mildly dilated.   IVC is dilated and not collapsing with inspiration.      HPI:  · 55 y/o male with a PMHx of asthma, COVID, HTN, HLD, DM type II, presents to the ED c/o productive cough x2 weeks and dyspnea. He states that his dyspnea has gotten worse over the past few days. His cough is productive of yellow sputum  Patient was found to have Covid, Rt lobar Pna and new onset Afib. Cardiology called to evaluate arrhythmia. Pt denies any exertional symptoms prior to hospitalization. He denies hx of cardiac disease.     9/29/'22: no complaints  9/30/22: Denies chest pain or shortness of breath. +cough yellow/white sputum (streak of blood last night); Tele: afib 70's; 40's during sleep    MEDICATIONS:  OUTPATIENT  MEDICATIONS  (STANDING):  apixaban 5 milliGRAM(s) Oral every 12 hours  atorvastatin 20 milliGRAM(s) Oral at bedtime  azithromycin   Tablet 500 milliGRAM(s) Oral daily  cefTRIAXone   IVPB 1000 milliGRAM(s) IV Intermittent every 24 hours  dextrose 5%. 1000 milliLiter(s) (100 mL/Hr) IV Continuous <Continuous>  dextrose 5%. 1000 milliLiter(s) (50 mL/Hr) IV Continuous <Continuous>  dextrose 50% Injectable 25 Gram(s) IV Push once  dextrose 50% Injectable 12.5 Gram(s) IV Push once  dextrose 50% Injectable 25 Gram(s) IV Push once  diltiazem    Tablet 60 milliGRAM(s) Oral every 6 hours  glucagon  Injectable 1 milliGRAM(s) IntraMuscular once  guaiFENesin  milliGRAM(s) Oral every 12 hours  insulin glargine Injectable (LANTUS) 10 Unit(s) SubCutaneous at bedtime  insulin lispro (ADMELOG) corrective regimen sliding scale   SubCutaneous three times a day before meals  losartan 50 milliGRAM(s) Oral daily  metoprolol succinate ER 50 milliGRAM(s) Oral daily    MEDICATIONS  (PRN):  ALBUTerol    90 MICROgram(s) HFA Inhaler 2 Puff(s) Inhalation every 4 hours PRN Shortness of Breath and/or Wheezing  dextrose Oral Gel 15 Gram(s) Oral once PRN Blood Glucose LESS THAN 70 milliGRAM(s)/deciliter      Home Medications:  amLODIPine 10 mg oral tablet: 1 tab(s) orally once a day (27 Sep 2022 17:53)  atorvastatin 20 mg oral tablet: 1 tab(s) orally once a day (at bedtime) (27 Sep 2022 17:53)  losartan-hydrochlorothiazide 100 mg-25 mg oral tablet: 1 tab(s) orally once a day (in the afternoon) (27 Sep 2022 17:53)  meloxicam 7.5 mg oral tablet: 1 tab(s) orally once a day, As Needed (27 Sep 2022 17:53)  metFORMIN 500 mg oral tablet: 1 tab(s) orally once a day (27 Sep 2022 17:53)  Voltaren 1% topical gel: Apply topically to affected area once a day, As Needed (27 Sep 2022 17:53)    Vital Signs Last 24 Hrs  T(C): 36.5 (30 Sep 2022 07:20), Max: 36.8 (29 Sep 2022 19:50)  T(F): 97.7 (30 Sep 2022 07:20), Max: 98.2 (29 Sep 2022 19:50)  HR: 68 (30 Sep 2022 07:20) (67 - 87)  BP: 126/73 (30 Sep 2022 07:20) (112/81 - 128/74)  BP(mean): --  RR: 18 (30 Sep 2022 07:20) (18 - 18)  SpO2: 98% (30 Sep 2022 07:20) (96% - 98%)    Parameters below as of 30 Sep 2022 07:20  Patient On (Oxygen Delivery Method): room air      Daily     Daily I&O's Summary      I&O's Detail      I&O's Summary      PHYSICAL EXAM:    Constitutional: NAD, awake and alert, well-developed  HEENT: PERR, EOMI,  No oral cyanosis.  Neck:  supple,  No JVD  Respiratory: Breath sounds are clear bilaterally, No wheezing, rales or rhonchi  Cardiovascular: S1 and S2, regular rate and rhythm, no Murmurs, gallops or rubs  Gastrointestinal: Bowel Sounds present, soft, nontender.   Extremities: No peripheral edema. No clubbing or cyanosis.  Vascular: 2+ peripheral pulses  Neurological: A/O x 3, no focal deficits  Musculoskeletal: no calf tenderness.  Skin: No rashes.      ===============================  ===============================  LABS:                           14.8   23.93 )-----------( 260      ( 30 Sep 2022 07:36 )             43.9                           16.0   28.12 )-----------( 296      ( 28 Sep 2022 04:27 )             47.3                         15.9   35.99 )-----------( 294      ( 27 Sep 2022 10:47 )             47.2     09-30    140  |  109<H>  |  22  ----------------------------<  134<H>  3.9   |  24  |  0.63    Ca    8.9      30 Sep 2022 07:36        28 Sep 2022 04:27    140    |  108    |  21     ----------------------------<  219    3.4     |  24     |  0.93   27 Sep 2022 10:47    140    |  106    |  15     ----------------------------<  120    3.6     |  28     |  0.91     Ca    8.9        28 Sep 2022 04:27  Ca    9.1        27 Sep 2022 10:47    TPro  6.5    /  Alb  3.3    /  TBili  1.3    /  DBili  x      /  AST  14     /  ALT  57     /  AlkPhos  77     28 Sep 2022 04:27  TPro  6.7    /  Alb  3.7    /  TBili  1.4    /  DBili  x      /  AST  20     /  ALT  70     /  AlkPhos  78     27 Sep 2022 10:47      ===============================  ===============================  CARDIAC BIOMARKERS:  BNP  Serum Pro-Brain Natriuretic Peptide: 1371 pg/mL *H* [0 - 125] (09-27-22 @ 10:47)  Serum Pro-Brain Natriuretic Peptide: 212 pg/mL *H* [0 - 125] (12-15-21 @ 09:34)      TROPONIN  Troponin I, High Sensitivity Result: 4.52 ng/L (09-28-22 @ 04:27)  Troponin I, High Sensitivity Result: 5.67 ng/L (09-27-22 @ 20:13)  Troponin I, High Sensitivity Result: 5.96 ng/L (09-27-22 @ 15:27)  Troponin I, High Sensitivity Result: 5.72 ng/L (09-27-22 @ 10:47)  Troponin I, High Sensitivity Result: 9.45 ng/L (12-15-21 @ 09:34)  Troponin I, Serum: <0.015 ng/mL [0.015 - 0.045] (03-31-20 @ 07:16)    ===============================  ===============================  BLOOD CULTURES:    Blood Culture: Organism --  Gram Stain Blood -- Gram Stain --  Specimen Source .Blood None  Culture-Blood --      09-27 @ 10:47  Pro Bnp 1371      ===============================  ===============================    RADIOLOGY/EKG:< from: 12 Lead ECG (09.27.22 @ 12:49) >  Diagnosis Line Atrial fibrillation with rapid ventricular response with premature ventricular or aberrantly conducted complexes  Nonspecific T wave abnormality    When compared with ECG of 27-SEP-2022 12:48,  Normal sinus rhythm seen then      Confirmed by RENETTA TANG MD ([id number]) on 9/27/2022 5:41:45 PM    < end of copied text >      e< from: TTE Echo Complete w/o Contrast w/ Doppler (09.29.22 @ 12:13) >     Impression     Summary     The mitral valve leaflets appear thickened.   Moderate (2+) mitral regurgitation is present.   Mild aortic sclerosis is present with normal valvular opening.   The tricuspid valve leaflets are thin and pliable; valve motion is   normal.   Moderate (2+)tricuspid valve regurgitation is present.   Mild pulmonary hypertension.   Pulmonic valve not well seen.   Trace pulmonic valvular regurgitation is present.   The left atrium is severely dilated.   Left ventricle systolic function appears preserved in the presence of a   cardiac arrhythmia. Left ventricle size and structure are within normal   limitations. Visual estimation of left ventricle ejection fraction is   50-55%.   A false tendon is noted near the left ventricular apex.   The right atrium appears moderately to severely dilated.   The right ventricle is mildly dilated.   IVC is dilated and not collapsing with inspiration.

## 2022-10-01 LAB
HCT VFR BLD CALC: 45 % — SIGNIFICANT CHANGE UP (ref 39–50)
HGB BLD-MCNC: 15 G/DL — SIGNIFICANT CHANGE UP (ref 13–17)
MCHC RBC-ENTMCNC: 28.7 PG — SIGNIFICANT CHANGE UP (ref 27–34)
MCHC RBC-ENTMCNC: 33.3 GM/DL — SIGNIFICANT CHANGE UP (ref 32–36)
MCV RBC AUTO: 86.2 FL — SIGNIFICANT CHANGE UP (ref 80–100)
PLATELET # BLD AUTO: 237 K/UL — SIGNIFICANT CHANGE UP (ref 150–400)
RBC # BLD: 5.22 M/UL — SIGNIFICANT CHANGE UP (ref 4.2–5.8)
RBC # FLD: 13.9 % — SIGNIFICANT CHANGE UP (ref 10.3–14.5)
WBC # BLD: 14.34 K/UL — HIGH (ref 3.8–10.5)
WBC # FLD AUTO: 14.34 K/UL — HIGH (ref 3.8–10.5)

## 2022-10-01 PROCEDURE — 99233 SBSQ HOSP IP/OBS HIGH 50: CPT

## 2022-10-01 RX ADMIN — Medication 600 MILLIGRAM(S): at 11:39

## 2022-10-01 RX ADMIN — AZITHROMYCIN 500 MILLIGRAM(S): 500 TABLET, FILM COATED ORAL at 11:38

## 2022-10-01 RX ADMIN — APIXABAN 5 MILLIGRAM(S): 2.5 TABLET, FILM COATED ORAL at 22:10

## 2022-10-01 RX ADMIN — APIXABAN 5 MILLIGRAM(S): 2.5 TABLET, FILM COATED ORAL at 11:39

## 2022-10-01 RX ADMIN — Medication 240 MILLIGRAM(S): at 11:37

## 2022-10-01 RX ADMIN — LOSARTAN POTASSIUM 50 MILLIGRAM(S): 100 TABLET, FILM COATED ORAL at 11:38

## 2022-10-01 RX ADMIN — ATORVASTATIN CALCIUM 20 MILLIGRAM(S): 80 TABLET, FILM COATED ORAL at 22:10

## 2022-10-01 RX ADMIN — Medication 600 MILLIGRAM(S): at 22:10

## 2022-10-01 RX ADMIN — Medication 50 MILLIGRAM(S): at 11:38

## 2022-10-01 RX ADMIN — CEFTRIAXONE 100 MILLIGRAM(S): 500 INJECTION, POWDER, FOR SOLUTION INTRAMUSCULAR; INTRAVENOUS at 03:51

## 2022-10-01 NOTE — PROGRESS NOTE ADULT - SUBJECTIVE AND OBJECTIVE BOX
Chief Complaint:    Interval Hx:    ROS: Multi system review is comprehensively negative x 10 systems except as above    Vitals:    Exam:  Gen:  HEENT:  Neck:  Chest:  CVS:  Abd:  Ext:  Skin:  Neuro:  Mood:    Labs:    Micro:    Imaging:    Cardiac Testing:    Meds:   Chief Complaint: Dyspnea, productive cough    Interval Hx: Patient seen and examined earlier today. Patient reports feeling markedly improved since admission. No longer with dyspnea. No longer with cough. Does have a bit of discomfort along the lower margin of his rib cage B/L. Non pleuritic. Not reproducible with pain. No radiation to arm or jaw. No nausea nor diaphoresis. Denies heartburn. No change with food. No other associated complaints.     ROS: Multi system review is comprehensively negative x 10 systems except as above    Vitals:  T(F): 98.3 (01 Oct 2022 20:01), Max: 98.3 (01 Oct 2022 20:01)  HR: 69 (01 Oct 2022 20:01) (69 - 91)  BP: 124/75 (01 Oct 2022 20:01) (116/57 - 132/97)  RR: 18 (01 Oct 2022 20:01) (16 - 18)  SpO2: 95% (01 Oct 2022 20:01) (95% - 99%) on room air    Exam:  Gen: Comfortable appearing  HEENT: NCAT PERRL EOMI  Neck: Supple, no JVD, no LAD  Chest: Normal resp effort at rest, lungs with trace B/L ronchi  CVS: S1 S2 normal, RRR  Abd: +BS, soft NT ND   Ext: No edema or calf tenderness  Skin: Warm, dry  Neuro: A+OX3 no gross deficits  Mood: Calm, pleasant    Labs:                  15.0   14.34 )-------( 237                45.0       140  |  109  |  22  ----------------------< 134  3.9   |   24   |  0.63    Ca 8.9    proBNP 1371   Troponin negative x3   A1c 6.6       Lactate WNL  Procalcitonin 0.05   Ddimer < 150   CRP 57    Micro:  Blood culture x 2 9/27: No growth to date  COVID19 PCR 9/27: Detected    Imaging:  CXR 9/27: Heart magnified by technique. Scattered infiltrates mainly in the right middle lobe area on the present study which is new since December 15, 2021.    Cardiac Testing:  Tele 10/1: Afib, rate 80s-90s, occasional PVCs    TTE 9/29: Mod MR. Mild aortic sclerosis is present with normal valvular opening. Mod TR. Mild pulmonary hypertension. Trace AK. LA severely dilated. LV systolic function appears preserved in the presence of a cardiac arrhythmia. LV size and structure are within normal limits. Visual estimation of LVEF 50-55%. False tendon is noted near the left ventricular apex. RA moderately to severely dilated. RV mildly dilated. IVC is dilated and not collapsing with inspiration.    EKG 9/27: Atrial fibrillation with rapid ventricular response with premature ventricular or aberrantly conducted complexes, nonspecific T wave abnormality    Meds:  MEDICATIONS  (STANDING):  apixaban 5 milliGRAM(s) Oral every 12 hours  atorvastatin 20 milliGRAM(s) Oral at bedtime  diltiazem    milliGRAM(s) Oral daily  guaiFENesin  milliGRAM(s) Oral every 12 hours  insulin lispro (ADMELOG) corrective regimen sliding scale   SubCutaneous three times a day before meals  losartan 50 milliGRAM(s) Oral daily  metoprolol succinate ER 50 milliGRAM(s) Oral daily    MEDICATIONS  (PRN):  ALBUTerol    90 MICROgram(s) HFA Inhaler 2 Puff(s) Inhalation every 4 hours PRN Shortness of Breath and/or Wheezing  dextrose Oral Gel 15 Gram(s) Oral once PRN Blood Glucose LESS THAN 70 milliGRAM(s)/deciliter

## 2022-10-01 NOTE — PROGRESS NOTE ADULT - PROBLEM SELECTOR PLAN 1
Recommendation:  New onset possibly in the setting of infection, Rates controlled; continue BB and AC. Echo w/ Low normal EF, moderate MR , moderate TR ,Consider ischemic w/u when clinically improved as in patient       Continue AC and closely monitor.  H&H stable

## 2022-10-01 NOTE — PROGRESS NOTE ADULT - SUBJECTIVE AND OBJECTIVE BOX
HPI:  · 55 y/o male with a PMHx of asthma, COVID, HTN, HLD, DM type II, presents to the ED c/o productive cough x2 weeks and dyspnea. He states that his dyspnea has gotten worse over the past few days. His cough is productive of yellow sputum  Patient was found to have Covid, Rt lobar Pna and new onset Afib. Cardiology called to evaluate arrhythmia. Pt denies any exertional symptoms prior to hospitalization. He denies hx of cardiac disease.     9/29/'22: no complaints  9/30/22: Denies chest pain or shortness of breath. +cough yellow/white sputum (streak of blood last night); Tele: afib 70's; 40's during sleep  10/1/22 patient is feeling better , migrating chest discomfort , cough , in afib with controlled rate 80-90s PVCS       Home Medications:  amLODIPine 10 mg oral tablet: 1 tab(s) orally once a day (27 Sep 2022 17:53)  atorvastatin 20 mg oral tablet: 1 tab(s) orally once a day (at bedtime) (27 Sep 2022 17:53)  losartan-hydrochlorothiazide 100 mg-25 mg oral tablet: 1 tab(s) orally once a day (in the afternoon) (27 Sep 2022 17:53)  meloxicam 7.5 mg oral tablet: 1 tab(s) orally once a day, As Needed (27 Sep 2022 17:53)  metFORMIN 500 mg oral tablet: 1 tab(s) orally once a day (27 Sep 2022 17:53)  Voltaren 1% topical gel: Apply topically to affected area once a day, As Needed (27 Sep 2022 17:53)      MEDICATIONS  (STANDING):  apixaban 5 milliGRAM(s) Oral every 12 hours  atorvastatin 20 milliGRAM(s) Oral at bedtime  azithromycin   Tablet 500 milliGRAM(s) Oral daily  cefTRIAXone   IVPB 1000 milliGRAM(s) IV Intermittent every 24 hours  dextrose 5%. 1000 milliLiter(s) (50 mL/Hr) IV Continuous <Continuous>  dextrose 5%. 1000 milliLiter(s) (100 mL/Hr) IV Continuous <Continuous>  dextrose 50% Injectable 25 Gram(s) IV Push once  dextrose 50% Injectable 12.5 Gram(s) IV Push once  dextrose 50% Injectable 25 Gram(s) IV Push once  diltiazem    milliGRAM(s) Oral daily  glucagon  Injectable 1 milliGRAM(s) IntraMuscular once  guaiFENesin  milliGRAM(s) Oral every 12 hours  insulin lispro (ADMELOG) corrective regimen sliding scale   SubCutaneous three times a day before meals  losartan 50 milliGRAM(s) Oral daily  metoprolol succinate ER 50 milliGRAM(s) Oral daily    MEDICATIONS  (PRN):  ALBUTerol    90 MICROgram(s) HFA Inhaler 2 Puff(s) Inhalation every 4 hours PRN Shortness of Breath and/or Wheezing  dextrose Oral Gel 15 Gram(s) Oral once PRN Blood Glucose LESS THAN 70 milliGRAM(s)/deciliter      Vital Signs Last 24 Hrs  T(C): 36.3 (01 Oct 2022 09:50), Max: 36.4 (30 Sep 2022 23:10)  T(F): 97.3 (01 Oct 2022 09:50), Max: 97.6 (30 Sep 2022 23:10)  HR: 77 (01 Oct 2022 09:50) (77 - 91)  BP: 116/57 (01 Oct 2022 09:50) (116/57 - 132/97)  BP(mean): --  RR: 16 (01 Oct 2022 09:50) (16 - 16)  SpO2: 99% (01 Oct 2022 09:50) (99% - 99%)    Parameters below as of 01 Oct 2022 09:50  Patient On (Oxygen Delivery Method): room air        I&O's Summary    01 Oct 2022 07:01  -  01 Oct 2022 13:37  --------------------------------------------------------  IN: 480 mL / OUT: 0 mL / NET: 480 mL        PHYSICAL EXAM:    Constitutional: NAD, awake and alert, well-developed  HEENT: PERR, EOMI,  No oral cyanosis.  Neck:  supple,  No JVD  Respiratory: Breath sounds are clear bilaterally, No wheezing, rales or rhonchi  Cardiovascular: S1 and S2, r IR IR   Gastrointestinal: Bowel Sounds present, soft, nontender.   Extremities: No peripheral edema. No clubbing or cyanosis.  Vascular: 2+ peripheral pulses  Neurological: A/O x 3, no focal deficits  Musculoskeletal: no calf tenderness.  Skin: No rashes.      ===============================  ===============================  LABS:                            15.0   14.34 )-----------( 237      ( 01 Oct 2022 07:30 )             45.0     09-30    140  |  109<H>  |  22  ----------------------------<  134<H>  3.9   |  24  |  0.63    Ca    8.9      30 Sep 2022 07:36                  Culture Results:   No growth to date. (09-27-22 @ 13:40)  Culture Results:   No growth to date. (09-27-22 @ 13:40)      ===============================  ===============================  CARDIAC BIOMARKERS:  BNP  Serum Pro-Brain Natriuretic Peptide: 1371 pg/mL *H* [0 - 125] (09-27-22 @ 10:47)  Serum Pro-Brain Natriuretic Peptide: 212 pg/mL *H* [0 - 125] (12-15-21 @ 09:34)      TROPONIN  Troponin I, High Sensitivity Result: 4.52 ng/L (09-28-22 @ 04:27)  Troponin I, High Sensitivity Result: 5.67 ng/L (09-27-22 @ 20:13)  Troponin I, High Sensitivity Result: 5.96 ng/L (09-27-22 @ 15:27)  Troponin I, High Sensitivity Result: 5.72 ng/L (09-27-22 @ 10:47)  Troponin I, High Sensitivity Result: 9.45 ng/L (12-15-21 @ 09:34)  Troponin I, Serum: <0.015 ng/mL [0.015 - 0.045] (03-31-20 @ 07:16)    ===============================  ===============================  BLOOD CULTURES:    Blood Culture: Organism --  Gram Stain Blood -- Gram Stain --  Specimen Source .Blood None  Culture-Blood --      09-27 @ 10:47  Pro Bnp 1371    - TroponinI hsT: <-4.52, <-5.67, <-5.96, <-5.72  ===============================  ===============================    RADIOLOGY/EKG:< from: 12 Lead ECG (09.27.22 @ 12:49) >  Diagnosis Line Atrial fibrillation with rapid ventricular response with premature ventricular or aberrantly conducted complexes  Nonspecific T wave abnormality    When compared with ECG of 27-SEP-2022 12:48,  Normal sinus rhythm seen then      Confirmed by RENETTA TANG MD ([id number]) on 9/27/2022 5:41:45 PM    < end of copied text >      e< from: TTE Echo Complete w/o Contrast w/ Doppler (09.29.22 @ 12:13) >     Impression     Summary     The mitral valve leaflets appear thickened.   Moderate (2+) mitral regurgitation is present.   Mild aortic sclerosis is present with normal valvular opening.   The tricuspid valve leaflets are thin and pliable; valve motion is   normal.   Moderate (2+)tricuspid valve regurgitation is present.   Mild pulmonary hypertension.   Pulmonic valve not well seen.   Trace pulmonic valvular regurgitation is present.   The left atrium is severely dilated.   Left ventricle systolic function appears preserved in the presence of a   cardiac arrhythmia. Left ventricle size and structure are within normal   limitations. Visual estimation of left ventricle ejection fraction is   50-55%.   A false tendon is noted near the left ventricular apex.   The right atrium appears moderately to severely dilated.   The right ventricle is mildly dilated.   IVC is dilated and not collapsing with inspiration.

## 2022-10-02 PROCEDURE — 99232 SBSQ HOSP IP/OBS MODERATE 35: CPT

## 2022-10-02 PROCEDURE — 99233 SBSQ HOSP IP/OBS HIGH 50: CPT

## 2022-10-02 RX ADMIN — Medication 240 MILLIGRAM(S): at 10:18

## 2022-10-02 RX ADMIN — Medication 600 MILLIGRAM(S): at 21:37

## 2022-10-02 RX ADMIN — APIXABAN 5 MILLIGRAM(S): 2.5 TABLET, FILM COATED ORAL at 10:18

## 2022-10-02 RX ADMIN — Medication 50 MILLIGRAM(S): at 10:18

## 2022-10-02 RX ADMIN — APIXABAN 5 MILLIGRAM(S): 2.5 TABLET, FILM COATED ORAL at 21:38

## 2022-10-02 RX ADMIN — ATORVASTATIN CALCIUM 20 MILLIGRAM(S): 80 TABLET, FILM COATED ORAL at 21:37

## 2022-10-02 RX ADMIN — LOSARTAN POTASSIUM 50 MILLIGRAM(S): 100 TABLET, FILM COATED ORAL at 10:18

## 2022-10-02 RX ADMIN — Medication 600 MILLIGRAM(S): at 10:18

## 2022-10-02 NOTE — PROGRESS NOTE ADULT - SUBJECTIVE AND OBJECTIVE BOX
HPI:  · 57 y/o male with a PMHx of asthma, COVID, HTN, HLD, DM type II, presents to the ED c/o productive cough x2 weeks and dyspnea. He states that his dyspnea has gotten worse over the past few days. His cough is productive of yellow sputum  Patient was found to have Covid, Rt lobar Pna and new onset Afib. Cardiology called to evaluate arrhythmia. Pt denies any exertional symptoms prior to hospitalization. He denies hx of cardiac disease.     9/29/'22: no complaints  9/30/22: Denies chest pain or shortness of breath. +cough yellow/white sputum (streak of blood last night); Tele: afib 70's; 40's during sleep  10/1/22 patient is feeling better , migrating chest discomfort , cough , in afib with controlled rate 80-90s PVCS   10/2/22 patient feeling better, some CP on inspiration with cough. AFIB on tele rate 60-70; bradycardiac when sleeping 40-50s    Home Medications:  amLODIPine 10 mg oral tablet: 1 tab(s) orally once a day (27 Sep 2022 17:53)  atorvastatin 20 mg oral tablet: 1 tab(s) orally once a day (at bedtime) (27 Sep 2022 17:53)  losartan-hydrochlorothiazide 100 mg-25 mg oral tablet: 1 tab(s) orally once a day (in the afternoon) (27 Sep 2022 17:53)  meloxicam 7.5 mg oral tablet: 1 tab(s) orally once a day, As Needed (27 Sep 2022 17:53)  metFORMIN 500 mg oral tablet: 1 tab(s) orally once a day (27 Sep 2022 17:53)  Voltaren 1% topical gel: Apply topically to affected area once a day, As Needed (27 Sep 2022 17:53)      MEDICATIONS  (STANDING):  apixaban 5 milliGRAM(s) Oral every 12 hours  atorvastatin 20 milliGRAM(s) Oral at bedtime  azithromycin   Tablet 500 milliGRAM(s) Oral daily  cefTRIAXone   IVPB 1000 milliGRAM(s) IV Intermittent every 24 hours  dextrose 5%. 1000 milliLiter(s) (50 mL/Hr) IV Continuous <Continuous>  dextrose 5%. 1000 milliLiter(s) (100 mL/Hr) IV Continuous <Continuous>  dextrose 50% Injectable 25 Gram(s) IV Push once  dextrose 50% Injectable 12.5 Gram(s) IV Push once  dextrose 50% Injectable 25 Gram(s) IV Push once  diltiazem    milliGRAM(s) Oral daily  glucagon  Injectable 1 milliGRAM(s) IntraMuscular once  guaiFENesin  milliGRAM(s) Oral every 12 hours  insulin lispro (ADMELOG) corrective regimen sliding scale   SubCutaneous three times a day before meals  losartan 50 milliGRAM(s) Oral daily  metoprolol succinate ER 50 milliGRAM(s) Oral daily    MEDICATIONS  (PRN):  ALBUTerol    90 MICROgram(s) HFA Inhaler 2 Puff(s) Inhalation every 4 hours PRN Shortness of Breath and/or Wheezing  dextrose Oral Gel 15 Gram(s) Oral once PRN Blood Glucose LESS THAN 70 milliGRAM(s)/deciliter      Vital Signs Last 24 Hrs  T(C): 36.3 (01 Oct 2022 09:50), Max: 36.4 (30 Sep 2022 23:10)  T(F): 97.3 (01 Oct 2022 09:50), Max: 97.6 (30 Sep 2022 23:10)  HR: 77 (01 Oct 2022 09:50) (77 - 91)  BP: 116/57 (01 Oct 2022 09:50) (116/57 - 132/97)  BP(mean): --  RR: 16 (01 Oct 2022 09:50) (16 - 16)  SpO2: 99% (01 Oct 2022 09:50) (99% - 99%)    Parameters below as of 01 Oct 2022 09:50  Patient On (Oxygen Delivery Method): room air        I&O's Summary    01 Oct 2022 07:01  -  01 Oct 2022 13:37  --------------------------------------------------------  IN: 480 mL / OUT: 0 mL / NET: 480 mL        PHYSICAL EXAM:    Constitutional: NAD, awake and alert, well-developed  HEENT: PERR, EOMI,  No oral cyanosis.  Neck:  supple,  No JVD  Respiratory: Breath sounds are clear bilaterally, No wheezing, rales or rhonchi  Cardiovascular: S1 and S2, r IR IR   Gastrointestinal: Bowel Sounds present, soft, nontender.   Extremities: No peripheral edema. No clubbing or cyanosis.  Vascular: 2+ peripheral pulses  Neurological: A/O x 3, no focal deficits  Musculoskeletal: no calf tenderness.  Skin: No rashes.      ===============================  ===============================  LABS:                            15.0   14.34 )-----------( 237      ( 01 Oct 2022 07:30 )             45.0     09-30    140  |  109<H>  |  22  ----------------------------<  134<H>  3.9   |  24  |  0.63    Ca    8.9      30 Sep 2022 07:36                  Culture Results:   No growth to date. (09-27-22 @ 13:40)  Culture Results:   No growth to date. (09-27-22 @ 13:40)      ===============================  ===============================  CARDIAC BIOMARKERS:  BNP  Serum Pro-Brain Natriuretic Peptide: 1371 pg/mL *H* [0 - 125] (09-27-22 @ 10:47)  Serum Pro-Brain Natriuretic Peptide: 212 pg/mL *H* [0 - 125] (12-15-21 @ 09:34)      TROPONIN  Troponin I, High Sensitivity Result: 4.52 ng/L (09-28-22 @ 04:27)  Troponin I, High Sensitivity Result: 5.67 ng/L (09-27-22 @ 20:13)  Troponin I, High Sensitivity Result: 5.96 ng/L (09-27-22 @ 15:27)  Troponin I, High Sensitivity Result: 5.72 ng/L (09-27-22 @ 10:47)  Troponin I, High Sensitivity Result: 9.45 ng/L (12-15-21 @ 09:34)  Troponin I, Serum: <0.015 ng/mL [0.015 - 0.045] (03-31-20 @ 07:16)    ===============================  ===============================  BLOOD CULTURES:    Blood Culture: Organism --  Gram Stain Blood -- Gram Stain --  Specimen Source .Blood None  Culture-Blood --      09-27 @ 10:47  Pro Bnp 1371    - TroponinI hsT: <-4.52, <-5.67, <-5.96, <-5.72  ===============================  ===============================    RADIOLOGY/EKG:< from: 12 Lead ECG (09.27.22 @ 12:49) >  Diagnosis Line Atrial fibrillation with rapid ventricular response with premature ventricular or aberrantly conducted complexes  Nonspecific T wave abnormality    When compared with ECG of 27-SEP-2022 12:48,  Normal sinus rhythm seen then      Confirmed by RENETTA TANG MD ([id number]) on 9/27/2022 5:41:45 PM    < end of copied text >      e< from: TTE Echo Complete w/o Contrast w/ Doppler (09.29.22 @ 12:13) >     Impression     Summary     The mitral valve leaflets appear thickened.   Moderate (2+) mitral regurgitation is present.   Mild aortic sclerosis is present with normal valvular opening.   The tricuspid valve leaflets are thin and pliable; valve motion is   normal.   Moderate (2+)tricuspid valve regurgitation is present.   Mild pulmonary hypertension.   Pulmonic valve not well seen.   Trace pulmonic valvular regurgitation is present.   The left atrium is severely dilated.   Left ventricle systolic function appears preserved in the presence of a   cardiac arrhythmia. Left ventricle size and structure are within normal   limitations. Visual estimation of left ventricle ejection fraction is   50-55%.   A false tendon is noted near the left ventricular apex.   The right atrium appears moderately to severely dilated.   The right ventricle is mildly dilated.   IVC is dilated and not collapsing with inspiration.      HPI:  · 55 y/o male with a PMHx of asthma, COVID, HTN, HLD, DM type II, presents to the ED c/o productive cough x2 weeks and dyspnea. He states that his dyspnea has gotten worse over the past few days. His cough is productive of yellow sputum  Patient was found to have Covid, Rt lobar Pna and new onset Afib. Cardiology called to evaluate arrhythmia. Pt denies any exertional symptoms prior to hospitalization. He denies hx of cardiac disease.     9/29/'22: no complaints  9/30/22: Denies chest pain or shortness of breath. +cough yellow/white sputum (streak of blood last night); Tele: afib 70's; 40's during sleep  10/1/22 patient is feeling better , migrating chest discomfort , cough , in afib with controlled rate 80-90s PVCS   10/2/22 patient feeling better, some CP on inspiration with cough. AFIB on tele rate 60-70; bradycardiac when sleeping 40-50s    Home Medications:  amLODIPine 10 mg oral tablet: 1 tab(s) orally once a day (27 Sep 2022 17:53)  atorvastatin 20 mg oral tablet: 1 tab(s) orally once a day (at bedtime) (27 Sep 2022 17:53)  losartan-hydrochlorothiazide 100 mg-25 mg oral tablet: 1 tab(s) orally once a day (in the afternoon) (27 Sep 2022 17:53)  meloxicam 7.5 mg oral tablet: 1 tab(s) orally once a day, As Needed (27 Sep 2022 17:53)  metFORMIN 500 mg oral tablet: 1 tab(s) orally once a day (27 Sep 2022 17:53)  Voltaren 1% topical gel: Apply topically to affected area once a day, As Needed (27 Sep 2022 17:53)      MEDICATIONS  (STANDING):  apixaban 5 milliGRAM(s) Oral every 12 hours  atorvastatin 20 milliGRAM(s) Oral at bedtime  dextrose 5%. 1000 milliLiter(s) (100 mL/Hr) IV Continuous <Continuous>  dextrose 5%. 1000 milliLiter(s) (50 mL/Hr) IV Continuous <Continuous>  dextrose 50% Injectable 25 Gram(s) IV Push once  dextrose 50% Injectable 12.5 Gram(s) IV Push once  dextrose 50% Injectable 25 Gram(s) IV Push once  diltiazem    milliGRAM(s) Oral daily  glucagon  Injectable 1 milliGRAM(s) IntraMuscular once  guaiFENesin  milliGRAM(s) Oral every 12 hours  insulin lispro (ADMELOG) corrective regimen sliding scale   SubCutaneous three times a day before meals  losartan 50 milliGRAM(s) Oral daily  metoprolol succinate ER 50 milliGRAM(s) Oral daily    MEDICATIONS  (PRN):  ALBUTerol    90 MICROgram(s) HFA Inhaler 2 Puff(s) Inhalation every 4 hours PRN Shortness of Breath and/or Wheezing  dextrose Oral Gel 15 Gram(s) Oral once PRN Blood Glucose LESS THAN 70 milliGRAM(s)/deciliter        Vital Signs Last 24 Hrs  T(C): 36.3 (01 Oct 2022 09:50), Max: 36.4 (30 Sep 2022 23:10)  T(F): 97.3 (01 Oct 2022 09:50), Max: 97.6 (30 Sep 2022 23:10)  HR: 77 (01 Oct 2022 09:50) (77 - 91)  BP: 116/57 (01 Oct 2022 09:50) (116/57 - 132/97)  BP(mean): --  RR: 16 (01 Oct 2022 09:50) (16 - 16)  SpO2: 99% (01 Oct 2022 09:50) (99% - 99%)    Parameters below as of 01 Oct 2022 09:50  Patient On (Oxygen Delivery Method): room air        I&O's Summary    01 Oct 2022 07:01  -  01 Oct 2022 13:37  --------------------------------------------------------  IN: 480 mL / OUT: 0 mL / NET: 480 mL        PHYSICAL EXAM:    Constitutional: NAD, awake and alert, well-developed  HEENT: PERR, EOMI,  No oral cyanosis.  Neck:  supple,  No JVD  Respiratory: Breath sounds are clear bilaterally, No wheezing, rales or rhonchi  Cardiovascular: S1 and S2, r IR IR   Gastrointestinal: Bowel Sounds present, soft, nontender.   Extremities: No peripheral edema. No clubbing or cyanosis.  Vascular: 2+ peripheral pulses  Neurological: A/O x 3, no focal deficits  Musculoskeletal: no calf tenderness.  Skin: No rashes.      ===============================  ===============================  LABS:                            15.0   14.34 )-----------( 237      ( 01 Oct 2022 07:30 )             45.0     09-30    140  |  109<H>  |  22  ----------------------------<  134<H>  3.9   |  24  |  0.63    Ca    8.9      30 Sep 2022 07:36                  Culture Results:   No growth to date. (09-27-22 @ 13:40)  Culture Results:   No growth to date. (09-27-22 @ 13:40)      ===============================  ===============================  CARDIAC BIOMARKERS:  BNP  Serum Pro-Brain Natriuretic Peptide: 1371 pg/mL *H* [0 - 125] (09-27-22 @ 10:47)  Serum Pro-Brain Natriuretic Peptide: 212 pg/mL *H* [0 - 125] (12-15-21 @ 09:34)      TROPONIN  Troponin I, High Sensitivity Result: 4.52 ng/L (09-28-22 @ 04:27)  Troponin I, High Sensitivity Result: 5.67 ng/L (09-27-22 @ 20:13)  Troponin I, High Sensitivity Result: 5.96 ng/L (09-27-22 @ 15:27)  Troponin I, High Sensitivity Result: 5.72 ng/L (09-27-22 @ 10:47)  Troponin I, High Sensitivity Result: 9.45 ng/L (12-15-21 @ 09:34)  Troponin I, Serum: <0.015 ng/mL [0.015 - 0.045] (03-31-20 @ 07:16)    ===============================  ===============================  BLOOD CULTURES:    Blood Culture: Organism --  Gram Stain Blood -- Gram Stain --  Specimen Source .Blood None  Culture-Blood --      09-27 @ 10:47  Pro Bnp 1371    - TroponinI hsT: <-4.52, <-5.67, <-5.96, <-5.72  ===============================  ===============================    RADIOLOGY/EKG:< from: 12 Lead ECG (09.27.22 @ 12:49) >  Diagnosis Line Atrial fibrillation with rapid ventricular response with premature ventricular or aberrantly conducted complexes  Nonspecific T wave abnormality    When compared with ECG of 27-SEP-2022 12:48,  Normal sinus rhythm seen then      Confirmed by RENTETA TANG MD ([id number]) on 9/27/2022 5:41:45 PM    < end of copied text >      e< from: TTE Echo Complete w/o Contrast w/ Doppler (09.29.22 @ 12:13) >     Impression     Summary     The mitral valve leaflets appear thickened.   Moderate (2+) mitral regurgitation is present.   Mild aortic sclerosis is present with normal valvular opening.   The tricuspid valve leaflets are thin and pliable; valve motion is   normal.   Moderate (2+)tricuspid valve regurgitation is present.   Mild pulmonary hypertension.   Pulmonic valve not well seen.   Trace pulmonic valvular regurgitation is present.   The left atrium is severely dilated.   Left ventricle systolic function appears preserved in the presence of a   cardiac arrhythmia. Left ventricle size and structure are within normal   limitations. Visual estimation of left ventricle ejection fraction is   50-55%.   A false tendon is noted near the left ventricular apex.   The right atrium appears moderately to severely dilated.   The right ventricle is mildly dilated.   IVC is dilated and not collapsing with inspiration.

## 2022-10-02 NOTE — PROGRESS NOTE ADULT - SUBJECTIVE AND OBJECTIVE BOX
Chief Complaint: Dyspnea, productive cough    Interval Hx: Patient seen and examined earlier today. Patient reports feeling markedly improved since admission. No longer with dyspnea. No longer with cough. his migrating chest complaints that yesterday were along the lower margin of his rib cage B/L are now gone. He is asymptomatic today. Eager to return home. Expressed that he would like to have appointments made for follow up prior to discharge because he usually has trouble securing an appointment for himself.     ROS: Multi system review is comprehensively negative x 10 systems except as above    Vitals:  T(F): 97.7 (02 Oct 2022 18:56), Max: 97.7 (02 Oct 2022 18:56)  HR: 73 (02 Oct 2022 18:56) (64 - 73)  BP: 134/80 (02 Oct 2022 18:56) (131/85 - 134/80)  RR: 18 (02 Oct 2022 18:56) (18 - 18)  SpO2: 99% (02 Oct 2022 18:56) (97% - 99%) on RA    Exam:  Gen: Comfortable appearing  HEENT: NCAT PERRL EOMI  Neck: Supple, no JVD, no LAD  Chest: Normal resp effort at rest, lungs clear  CVS: S1 S2 normal, RRR  Abd: +BS, soft NT ND   Ext: No edema or calf tenderness  Skin: Warm, dry  Neuro: A+OX3 no gross deficits  Mood: Calm, pleasant    Labs:                  15.0   14.34 )-------( 237                45.0       140  |  109  |  22  ----------------------< 134  3.9   |   24   |  0.63    Ca 8.9    proBNP 1371   Troponin negative x3   A1c 6.6       Lactate WNL  Procalcitonin 0.05   Ddimer < 150   CRP 57    Micro:  Blood culture x 2 9/27: No growth to date  COVID19 PCR 9/27: Detected    Imaging:  CXR 9/27: Heart magnified by technique. Scattered infiltrates mainly in the right middle lobe area on the present study which is new since December 15, 2021.    Cardiac Testing:  Tele 10/1: Afib, rate 80s-90s, occasional PVCs    TTE 9/29: Mod MR. Mild aortic sclerosis is present with normal valvular opening. Mod TR. Mild pulmonary hypertension. Trace [place]. LA severely dilated. LV systolic function appears preserved in the presence of a cardiac arrhythmia. LV size and structure are within normal limits. Visual estimation of LVEF 50-55%. False tendon is noted near the left ventricular apex. RA moderately to severely dilated. RV mildly dilated. IVC is dilated and not collapsing with inspiration.    EKG 9/27: Atrial fibrillation with rapid ventricular response with premature ventricular or aberrantly conducted complexes, nonspecific T wave abnormality    Meds:  MEDICATIONS  (STANDING):  apixaban 5 milliGRAM(s) Oral every 12 hours  atorvastatin 20 milliGRAM(s) Oral at bedtime  diltiazem    milliGRAM(s) Oral daily  guaiFENesin  milliGRAM(s) Oral every 12 hours  insulin lispro (ADMELOG) corrective regimen sliding scale   SubCutaneous three times a day before meals  losartan 50 milliGRAM(s) Oral daily  metoprolol succinate ER 50 milliGRAM(s) Oral daily    MEDICATIONS  (PRN):  ALBUTerol    90 MICROgram(s) HFA Inhaler 2 Puff(s) Inhalation every 4 hours PRN Shortness of Breath and/or Wheezing  dextrose Oral Gel 15 Gram(s) Oral once PRN Blood Glucose LESS THAN 70 milliGRAM(s)/deciliter

## 2022-10-02 NOTE — PROGRESS NOTE ADULT - PROBLEM SELECTOR PLAN 1
Recommendation:  New onset possibly in the setting of infection, Rates controlled; continue BB and AC. Echo w/ Low normal EF, moderate MR , moderate TR ,Consider ischemic w/u when clinically improved as in patient       Continue AC and closely monitor.  H&H stable Recommendation:  New onset possibly in the setting of infection, Rates controlled; continue BB and AC. Echo w/ Low normal EF  ( possible tachycardia related ) , moderate MR , moderate TR ,Consider ischemic w/u when clinically improved as in patient       Continue AC and closely monitor.  H&H stable

## 2022-10-03 PROCEDURE — 93016 CV STRESS TEST SUPVJ ONLY: CPT

## 2022-10-03 PROCEDURE — 99232 SBSQ HOSP IP/OBS MODERATE 35: CPT

## 2022-10-03 PROCEDURE — 93018 CV STRESS TEST I&R ONLY: CPT

## 2022-10-03 PROCEDURE — 99233 SBSQ HOSP IP/OBS HIGH 50: CPT

## 2022-10-03 RX ORDER — APIXABAN 2.5 MG/1
1 TABLET, FILM COATED ORAL
Qty: 60 | Refills: 0
Start: 2022-10-03 | End: 2022-11-01

## 2022-10-03 RX ORDER — METOPROLOL TARTRATE 50 MG
1 TABLET ORAL
Qty: 30 | Refills: 0
Start: 2022-10-03 | End: 2022-11-01

## 2022-10-03 RX ORDER — LOSARTAN/HYDROCHLOROTHIAZIDE 100MG-25MG
1 TABLET ORAL
Qty: 0 | Refills: 0 | DISCHARGE

## 2022-10-03 RX ORDER — LOSARTAN POTASSIUM 100 MG/1
1 TABLET, FILM COATED ORAL
Qty: 30 | Refills: 0
Start: 2022-10-03 | End: 2022-11-01

## 2022-10-03 RX ORDER — MELOXICAM 15 MG/1
1 TABLET ORAL
Qty: 0 | Refills: 0 | DISCHARGE

## 2022-10-03 RX ORDER — DILTIAZEM HCL 120 MG
1 CAPSULE, EXT RELEASE 24 HR ORAL
Qty: 30 | Refills: 0
Start: 2022-10-03 | End: 2022-11-01

## 2022-10-03 RX ORDER — AMLODIPINE BESYLATE 2.5 MG/1
1 TABLET ORAL
Qty: 0 | Refills: 0 | DISCHARGE

## 2022-10-03 RX ADMIN — APIXABAN 5 MILLIGRAM(S): 2.5 TABLET, FILM COATED ORAL at 21:38

## 2022-10-03 RX ADMIN — Medication 240 MILLIGRAM(S): at 12:06

## 2022-10-03 RX ADMIN — ATORVASTATIN CALCIUM 20 MILLIGRAM(S): 80 TABLET, FILM COATED ORAL at 21:37

## 2022-10-03 RX ADMIN — APIXABAN 5 MILLIGRAM(S): 2.5 TABLET, FILM COATED ORAL at 12:06

## 2022-10-03 RX ADMIN — Medication 600 MILLIGRAM(S): at 12:06

## 2022-10-03 RX ADMIN — LOSARTAN POTASSIUM 50 MILLIGRAM(S): 100 TABLET, FILM COATED ORAL at 12:07

## 2022-10-03 RX ADMIN — Medication 50 MILLIGRAM(S): at 12:06

## 2022-10-03 RX ADMIN — Medication 600 MILLIGRAM(S): at 21:37

## 2022-10-03 NOTE — PROGRESS NOTE ADULT - PROBLEM SELECTOR PLAN 2
controlled  on current medication ,

## 2022-10-03 NOTE — PROGRESS NOTE ADULT - PROBLEM SELECTOR PLAN 1
Recommendation:  New onset possibly in the setting of infection, Rates controlled; continue BB and AC. Echo w/ Low normal EF  ( possible tachycardia related ) , moderate MR , moderate TR ,  stress test planned for 10/4.      Continue AC and closely monitor.  H&H stable

## 2022-10-03 NOTE — PROGRESS NOTE ADULT - PROBLEM SELECTOR PLAN 3
improving   on antibiotics

## 2022-10-03 NOTE — PROGRESS NOTE ADULT - SUBJECTIVE AND OBJECTIVE BOX
Chief Complaint: Dyspnea, productive cough    Interval Hx: Patient seen and examined earlier today. Patient reports feeling markedly improved since admission. No longer with dyspnea. No longer with cough. his migrating chest complaints that yesterday were along the lower margin of his rib cage B/L are now gone. He is asymptomatic today. Eager to return home. Expressed that he would like to have appointments made for follow up prior to discharge because he usually has trouble securing an appointment for himself.     sub: no complaints    ROS: Multi system review is comprehensively negative x 10 systems except as above    Vitals:  ICU Vital Signs Last 24 Hrs  T(C): 36.6 (02 Oct 2022 23:22), Max: 36.6 (02 Oct 2022 23:22)  T(F): 97.9 (02 Oct 2022 23:22), Max: 97.9 (02 Oct 2022 23:22)  HR: 77 (02 Oct 2022 23:22) (73 - 77)  BP: 132/79 (02 Oct 2022 23:22) (132/79 - 134/80)  BP(mean): --  ABP: --  ABP(mean): --  RR: 18 (02 Oct 2022 23:22) (18 - 18)  SpO2: 99% (02 Oct 2022 23:22) (99% - 99%)    O2 Parameters below as of 02 Oct 2022 23:22  Patient On (Oxygen Delivery Method): room air            Exam:  Gen: Comfortable appearing  HEENT: NCAT PERRL EOMI  Neck: Supple, no JVD, no LAD  Chest: Normal resp effort at rest, lungs clear  CVS: S1 S2 normal, RRR  Abd: +BS, soft NT ND   Ext: No edema or calf tenderness  Skin: Warm, dry  Neuro: A+OX3 no gross deficits  Mood: Calm, pleasant    Labs:                  15.0   14.34 )-------( 237                45.0       140  |  109  |  22  ----------------------< 134  3.9   |   24   |  0.63    Ca 8.9    proBNP 1371   Troponin negative x3   A1c 6.6       Lactate WNL  Procalcitonin 0.05   Ddimer < 150   CRP 57    Micro:  Blood culture x 2 9/27: No growth to date  COVID19 PCR 9/27: Detected    Imaging:  CXR 9/27: Heart magnified by technique. Scattered infiltrates mainly in the right middle lobe area on the present study which is new since December 15, 2021.    Cardiac Testing:  Tele 10/1: Afib, rate 80s-90s, occasional PVCs    TTE 9/29: Mod MR. Mild aortic sclerosis is present with normal valvular opening. Mod TR. Mild pulmonary hypertension. Trace [place]. LA severely dilated. LV systolic function appears preserved in the presence of a cardiac arrhythmia. LV size and structure are within normal limits. Visual estimation of LVEF 50-55%. False tendon is noted near the left ventricular apex. RA moderately to severely dilated. RV mildly dilated. IVC is dilated and not collapsing with inspiration.    EKG 9/27: Atrial fibrillation with rapid ventricular response with premature ventricular or aberrantly conducted complexes, nonspecific T wave abnormality    Meds:  MEDICATIONS  (STANDING):  apixaban 5 milliGRAM(s) Oral every 12 hours  atorvastatin 20 milliGRAM(s) Oral at bedtime  diltiazem    milliGRAM(s) Oral daily  guaiFENesin  milliGRAM(s) Oral every 12 hours  insulin lispro (ADMELOG) corrective regimen sliding scale   SubCutaneous three times a day before meals  losartan 50 milliGRAM(s) Oral daily  metoprolol succinate ER 50 milliGRAM(s) Oral daily    MEDICATIONS  (PRN):  ALBUTerol    90 MICROgram(s) HFA Inhaler 2 Puff(s) Inhalation every 4 hours PRN Shortness of Breath and/or Wheezing  dextrose Oral Gel 15 Gram(s) Oral once PRN Blood Glucose LESS THAN 70 milliGRAM(s)/deciliter

## 2022-10-03 NOTE — PROGRESS NOTE ADULT - SUBJECTIVE AND OBJECTIVE BOX
HPI:  · 57 y/o male with a PMHx of asthma, COVID, HTN, HLD, DM type II, presents to the ED c/o productive cough x2 weeks and dyspnea. He states that his dyspnea has gotten worse over the past few days. His cough is productive of yellow sputum  Patient was found to have Covid, Rt lobar Pna and new onset Afib. Cardiology called to evaluate arrhythmia. Pt denies any exertional symptoms prior to hospitalization. He denies hx of cardiac disease.     9/29/'22: no complaints  9/30/22: Denies chest pain or shortness of breath. +cough yellow/white sputum (streak of blood last night); Tele: afib 70's; 40's during sleep  10/1/22 patient is feeling better , migrating chest discomfort , cough , in afib with controlled rate 80-90s PVCS   10/2/22 patient feeling better, some CP on inspiration with cough. AFIB on tele rate 60-70; bradycardiac when sleeping 40-50s  10/3/'22: no complaints    MEDICATIONS:  OUTPATIENT  Home Medications:  atorvastatin 20 mg oral tablet: 1 tab(s) orally once a day (at bedtime) (27 Sep 2022 17:53)  metFORMIN 500 mg oral tablet: 1 tab(s) orally once a day (27 Sep 2022 17:53)  Voltaren 1% topical gel: Apply topically to affected area once a day, As Needed (27 Sep 2022 17:53)      INPATIENT  MEDICATIONS  (STANDING):  apixaban 5 milliGRAM(s) Oral every 12 hours  atorvastatin 20 milliGRAM(s) Oral at bedtime  dextrose 5%. 1000 milliLiter(s) (100 mL/Hr) IV Continuous <Continuous>  dextrose 5%. 1000 milliLiter(s) (50 mL/Hr) IV Continuous <Continuous>  dextrose 50% Injectable 25 Gram(s) IV Push once  dextrose 50% Injectable 12.5 Gram(s) IV Push once  dextrose 50% Injectable 25 Gram(s) IV Push once  diltiazem    milliGRAM(s) Oral daily  glucagon  Injectable 1 milliGRAM(s) IntraMuscular once  guaiFENesin  milliGRAM(s) Oral every 12 hours  insulin lispro (ADMELOG) corrective regimen sliding scale   SubCutaneous three times a day before meals  losartan 50 milliGRAM(s) Oral daily  metoprolol succinate ER 50 milliGRAM(s) Oral daily    MEDICATIONS  (PRN):  ALBUTerol    90 MICROgram(s) HFA Inhaler 2 Puff(s) Inhalation every 4 hours PRN Shortness of Breath and/or Wheezing  dextrose Oral Gel 15 Gram(s) Oral once PRN Blood Glucose LESS THAN 70 milliGRAM(s)/deciliter          Vital Signs Last 24 Hrs  T(C): 36.8 (03 Oct 2022 15:21), Max: 36.8 (03 Oct 2022 15:21)  T(F): 98.2 (03 Oct 2022 15:21), Max: 98.2 (03 Oct 2022 15:21)  HR: 78 (03 Oct 2022 15:21) (77 - 78)  BP: 117/80 (03 Oct 2022 15:21) (117/80 - 132/79)  BP(mean): --  RR: 18 (03 Oct 2022 15:21) (18 - 18)  SpO2: 98% (03 Oct 2022 15:21) (98% - 99%)    Parameters below as of 03 Oct 2022 15:21  Patient On (Oxygen Delivery Method): room air    Daily     Daily I&O's Summary    03 Oct 2022 07:01  -  03 Oct 2022 20:07  --------------------------------------------------------  IN: 420 mL / OUT: 0 mL / NET: 420 mL        I&O's Detail    03 Oct 2022 07:01  -  03 Oct 2022 20:07  --------------------------------------------------------  IN:    Oral Fluid: 420 mL  Total IN: 420 mL    OUT:  Total OUT: 0 mL    Total NET: 420 mL          I&O's Summary    03 Oct 2022 07:01  -  03 Oct 2022 20:07  --------------------------------------------------------  IN: 420 mL / OUT: 0 mL / NET: 420 mL        PHYSICAL EXAM:    Constitutional: NAD, awake and alert, well-developed  HEENT: PERR, EOMI,  No oral cyanosis.  Neck:  supple,  No JVD  Respiratory: Breath sounds are clear bilaterally, No wheezing, rales or rhonchi  Cardiovascular: S1 and S2, r IR IR   Gastrointestinal: Bowel Sounds present, soft, nontender.   Extremities: No peripheral edema. No clubbing or cyanosis.  Vascular: 2+ peripheral pulses  Neurological: A/O x 3, no focal deficits  Musculoskeletal: no calf tenderness.  Skin: No rashes.          ===============================  ===============================  LABS:                         15.0   14.34 )-----------( 237      ( 01 Oct 2022 07:30 )             45.0             ===============================  ===============================  CARDIAC BIOMARKERS:  BNP  Serum Pro-Brain Natriuretic Peptide: 1371 pg/mL *H* [0 - 125] (09-27-22 @ 10:47)  Serum Pro-Brain Natriuretic Peptide: 212 pg/mL *H* [0 - 125] (12-15-21 @ 09:34)      TROPONIN  Troponin I, High Sensitivity Result: 4.52 ng/L (09-28-22 @ 04:27)  Troponin I, High Sensitivity Result: 5.67 ng/L (09-27-22 @ 20:13)  Troponin I, High Sensitivity Result: 5.96 ng/L (09-27-22 @ 15:27)  Troponin I, High Sensitivity Result: 5.72 ng/L (09-27-22 @ 10:47)  Troponin I, High Sensitivity Result: 9.45 ng/L (12-15-21 @ 09:34)  Troponin I, Serum: <0.015 ng/mL [0.015 - 0.045] (03-31-20 @ 07:16)    ===============================  ===============================    RADIOLOGY/EKG:< from: 12 Lead ECG (09.27.22 @ 12:49) >  Diagnosis Line Atrial fibrillation with rapid ventricular response with premature ventricular or aberrantly conducted complexes  Nonspecific T wave abnormality    When compared with ECG of 27-SEP-2022 12:48,  Normal sinus rhythm seen then      Confirmed by RENETTA TANG MD ([id number]) on 9/27/2022 5:41:45 PM    < end of copied text >      e< from: TTE Echo Complete w/o Contrast w/ Doppler (09.29.22 @ 12:13) >     Impression     Summary     The mitral valve leaflets appear thickened.   Moderate (2+) mitral regurgitation is present.   Mild aortic sclerosis is present with normal valvular opening.   The tricuspid valve leaflets are thin and pliable; valve motion is   normal.   Moderate (2+)tricuspid valve regurgitation is present.   Mild pulmonary hypertension.   Pulmonic valve not well seen.   Trace pulmonic valvular regurgitation is present.   The left atrium is severely dilated.   Left ventricle systolic function appears preserved in the presence of a   cardiac arrhythmia. Left ventricle size and structure are within normal   limitations. Visual estimation of left ventricle ejection fraction is   50-55%.   A false tendon is noted near the left ventricular apex.   The right atrium appears moderately to severely dilated.   The right ventricle is mildly dilated.   IVC is dilated and not collapsing with inspiration.      ===============================    Abdulaziz Gayle M.D.  Cardiology, [facility]  Cell: [phone]  Offices:   [phone] ([facility] Office)  [phone] ([facility] Office)

## 2022-10-04 ENCOUNTER — TRANSCRIPTION ENCOUNTER (OUTPATIENT)
Age: 57
End: 2022-10-04

## 2022-10-04 PROCEDURE — 99233 SBSQ HOSP IP/OBS HIGH 50: CPT

## 2022-10-04 PROCEDURE — 78452 HT MUSCLE IMAGE SPECT MULT: CPT | Mod: 26

## 2022-10-04 RX ORDER — METFORMIN HYDROCHLORIDE 850 MG/1
1 TABLET ORAL
Qty: 30 | Refills: 0
Start: 2022-10-04

## 2022-10-04 RX ORDER — LOSARTAN POTASSIUM 100 MG/1
1 TABLET, FILM COATED ORAL
Qty: 30 | Refills: 0
Start: 2022-10-04 | End: 2022-11-02

## 2022-10-04 RX ORDER — METFORMIN HYDROCHLORIDE 850 MG/1
1 TABLET ORAL
Qty: 0 | Refills: 0 | DISCHARGE

## 2022-10-04 RX ORDER — REGADENOSON 0.08 MG/ML
0.4 INJECTION, SOLUTION INTRAVENOUS ONCE
Refills: 0 | Status: COMPLETED | OUTPATIENT
Start: 2022-10-04 | End: 2022-10-04

## 2022-10-04 RX ORDER — DILTIAZEM HCL 120 MG
1 CAPSULE, EXT RELEASE 24 HR ORAL
Qty: 30 | Refills: 0
Start: 2022-10-04 | End: 2022-11-02

## 2022-10-04 RX ORDER — APIXABAN 2.5 MG/1
1 TABLET, FILM COATED ORAL
Qty: 60 | Refills: 0
Start: 2022-10-04 | End: 2022-11-02

## 2022-10-04 RX ORDER — ATORVASTATIN CALCIUM 80 MG/1
1 TABLET, FILM COATED ORAL
Qty: 30 | Refills: 0
Start: 2022-10-04

## 2022-10-04 RX ORDER — ATORVASTATIN CALCIUM 80 MG/1
1 TABLET, FILM COATED ORAL
Qty: 0 | Refills: 0 | DISCHARGE

## 2022-10-04 RX ORDER — METOPROLOL TARTRATE 50 MG
1 TABLET ORAL
Qty: 30 | Refills: 0
Start: 2022-10-04 | End: 2022-11-02

## 2022-10-04 RX ADMIN — Medication 600 MILLIGRAM(S): at 22:21

## 2022-10-04 RX ADMIN — APIXABAN 5 MILLIGRAM(S): 2.5 TABLET, FILM COATED ORAL at 22:21

## 2022-10-04 RX ADMIN — REGADENOSON 0.4 MILLIGRAM(S): 0.08 INJECTION, SOLUTION INTRAVENOUS at 14:05

## 2022-10-04 RX ADMIN — ATORVASTATIN CALCIUM 20 MILLIGRAM(S): 80 TABLET, FILM COATED ORAL at 22:21

## 2022-10-04 NOTE — DISCHARGE NOTE PROVIDER - NSDCCAREPROVSEEN_GEN_ALL_CORE_FT
Anirudh, Saundra Leavitt, Joseph Martin, Luis Solis, John Gayle, Anil G Palla, Gonzales Perez, Prudencio Delgadillo

## 2022-10-04 NOTE — DISCHARGE NOTE PROVIDER - HOSPITAL COURSE
# COVID 19 infection, present upon admission  No fever. No hypoxia. No longer with dyspnea or cough.   - Continue supportive care measures - encourage incentive spirometry, OOB to chair daily, DVT px    # Unable to rule out bacterial lobar pneumonia of the right middle lobe, present upon admission  Clinically improved. Blood cultures negative to date. Completed 5-day course of ceftriaxone and azithromycin, 9/27-10/1.   - Monitor as needed    # New onset afib with RVR  Remains in afib but no longer in RVR. Rate well controlled on diltiazem CD 240mg daily and metoprolol succinate 50mg daily. On Eliquis for stroke risk-reduction.   - Continue diltiazem CD and metoprolol succinate, stop amlodipine and HCTZ  - Continue Eliquis - verified that medication is covered. Explained all risks and benefits of doac to patient in native language    # Low-normal LV systolic function, moderate MR and TR  As noted on TTE this admission. Cardiology advises ischemic evaluation, planned for Monday.  SP Nuclear stress today, if negative DC home    # HTN  BP controlled  - Continue metoprolol succinate, diltiazem CD and losartan    # HLD  Stable  - Continue atorvastatin    # DM  A1c 6.6. On Lispro correctional scale. Glucose 80s-100s.

## 2022-10-04 NOTE — DISCHARGE NOTE PROVIDER - NSDCCPCAREPLAN_GEN_ALL_CORE_FT
PRINCIPAL DISCHARGE DIAGNOSIS  Diagnosis: Atrial fibrillation with RVR  Assessment and Plan of Treatment: You were diagnosed with Afib, an abnormal heart beat.  You need to continue eliquis, metoprolol and cardizem as prescribed. Please follow up with Dr. Bernabe your cardiologist      SECONDARY DISCHARGE DIAGNOSES  Diagnosis: PNA (pneumonia)  Assessment and Plan of Treatment: You were diagnosed with Pneumonia. You completed treatment. Please fu at the [facility] for follow up    Diagnosis: 2019 novel coronavirus disease (COVID-19)  Assessment and Plan of Treatment: You have Covid 19 but are stable. Please monitor for worsening cough, sob or fever.

## 2022-10-04 NOTE — DISCHARGE NOTE NURSING/CASE MANAGEMENT/SOCIAL WORK - NSDCPEFALRISK_GEN_ALL_CORE
For information on Fall & Injury Prevention, visit: [email or web address] OR  [email or web address] OR  https://www.cdc.gov/steadi/patient.html

## 2022-10-04 NOTE — DISCHARGE NOTE NURSING/CASE MANAGEMENT/SOCIAL WORK - NSDCFUADDAPPT_GEN_ALL_CORE_FT
Called the [facility] to make a follow up appointment wasn't able to talk to anyone, left a message. Dr. Laura Lainez MD  [facility]   [address],   [place], [place] [address] [phone]  Thursday October 13, 2022 @ 12   please arrive 15 minutes early

## 2022-10-04 NOTE — CONSULT NOTE ADULT - CONSULT REQUESTED DATE/TIME
28-Sep-2022 09:47
Food & Nutrition  Education    Diet Education: Diabetic diet education   Learners: patient     Nutrition Education provided with handouts: Consistent carbohydrate diet nutrition therapy     Comments: Pt endorses following DM diet at home, aware of A1c value stating it is recently decreasing from 14. Pt aware of consistent carbohydrate diet and carbohydrate sources. Discussed making small changes to snacks and meals to better follow DM diet. Pt with good PO intake since admit and PTA. No s/s of malnutrition at this time.     All questions and concerns answered. Dietitian's contact information provided.       Follow-Up: 1/15/20    Please Re-consult as needed    Thanks!  Ruma Gama RD, LDN     
04-Oct-2022 18:50

## 2022-10-04 NOTE — CONSULT NOTE ADULT - PROBLEM SELECTOR RECOMMENDATION 9
New onset possibly in the setting of infection, Rates controlled; continue BB and AC.   Echo w/ Low normal EF  ( possible tachycardia related ), moderate MR , moderate TR ,  stress test w/ normal EF no perfusion defects.      Continue AC and closely monitor.  H&H stable.
Pt given po Cardizem. Rate is marginally controlled with brief periods of acceleration. Will add low dose BB. Await Echo. Will need ischemia workup in the setting of new onset arrhythmia and Covid when improved.

## 2022-10-04 NOTE — DISCHARGE NOTE PROVIDER - CARE PROVIDER_API CALL
Laura Lainez)  Family Medicine  [address] [place] [address]  Phone: [phone]  Fax: [phone]  Follow Up Time:     Abdulaziz Gayle)  Cardiology  [address] [place] [address]  Phone: [phone]  Fax: [phone]  Follow Up Time:

## 2022-10-04 NOTE — DISCHARGE NOTE NURSING/CASE MANAGEMENT/SOCIAL WORK - PATIENT PORTAL LINK FT
You can access the FollowMyHealth Patient Portal offered by [facility] by registering at the following website: [email or web address]. By joining [employer]’s FollowMyHealth portal, you will also be able to view your health information using other applications (apps) compatible with our system.

## 2022-10-04 NOTE — PROGRESS NOTE ADULT - SUBJECTIVE AND OBJECTIVE BOX
HOSPITALIST ATTENDING PROGRESS NOTE    Chart and meds reviewed.  Patient seen and examined.    CC:    Subjective:    All other systems reviewed and found to be negative with the exception of what has been described above.    MEDICATIONS  (STANDING):  apixaban 5 milliGRAM(s) Oral every 12 hours  atorvastatin 20 milliGRAM(s) Oral at bedtime  dextrose 5%. 1000 milliLiter(s) (100 mL/Hr) IV Continuous <Continuous>  dextrose 5%. 1000 milliLiter(s) (50 mL/Hr) IV Continuous <Continuous>  dextrose 50% Injectable 25 Gram(s) IV Push once  dextrose 50% Injectable 12.5 Gram(s) IV Push once  dextrose 50% Injectable 25 Gram(s) IV Push once  diltiazem    milliGRAM(s) Oral daily  glucagon  Injectable 1 milliGRAM(s) IntraMuscular once  guaiFENesin  milliGRAM(s) Oral every 12 hours  insulin lispro (ADMELOG) corrective regimen sliding scale   SubCutaneous three times a day before meals  losartan 50 milliGRAM(s) Oral daily  metoprolol succinate ER 50 milliGRAM(s) Oral daily    MEDICATIONS  (PRN):  ALBUTerol    90 MICROgram(s) HFA Inhaler 2 Puff(s) Inhalation every 4 hours PRN Shortness of Breath and/or Wheezing  dextrose Oral Gel 15 Gram(s) Oral once PRN Blood Glucose LESS THAN 70 milliGRAM(s)/deciliter      VITALS:  T(F): 97.3 (10-04-22 @ 09:09), Max: 97.9 (10-04-22 @ 00:04)  HR: 62 (10-04-22 @ 09:09) (62 - 68)  BP: 133/90 (10-04-22 @ 09:09) (121/73 - 133/90)  RR: 17 (10-04-22 @ 09:09) (17 - 18)  SpO2: 98% (10-04-22 @ 09:09) (98% - 98%)  Wt(kg): --    I&O's Summary    03 Oct 2022 07:01  -  04 Oct 2022 07:00  --------------------------------------------------------  IN: 420 mL / OUT: 0 mL / NET: 420 mL        CAPILLARY BLOOD GLUCOSE      POCT Blood Glucose.: 86 mg/dL (04 Oct 2022 12:42)  POCT Blood Glucose.: 98 mg/dL (04 Oct 2022 06:19)  POCT Blood Glucose.: 107 mg/dL (03 Oct 2022 21:02)  POCT Blood Glucose.: 105 mg/dL (03 Oct 2022 16:51)      PHYSICAL EXAM:  GEN: NAD  HEENT:  pupils equal and reactive, EOMI, no oropharyngeal lesions, erythema, exudates, oral thrush  NECK:   supple, no carotid bruits, no palpable lymph nodes, no thyromegaly  CV:  +S1, +S2, regular, no murmurs or rubs  RESP:   lungs clear to auscultation bilaterally, no wheezing, rales, rhonchi, good air entry bilaterally  BREAST:  not examined  GI:  abdomen soft, non-tender, non-distended, normal BS, no bruits, no abdominal masses, no palpable masses  RECTAL:  not examined  :  not examined  MSK:   normal muscle tone, no atrophy, no rigidity, no contractions  EXT:  no clubbing, no cyanosis, no edema, no calf pain, swelling or erythema  VASCULAR:  pulses equal and symmetric in the upper and lower extremities  NEURO:  AAOX3, no focal neurological deficits, follows all commands, able to move extremities spontaneously  SKIN:  no ulcers, lesions or rashes   HOSPITALIST ATTENDING PROGRESS NOTE    Chart and meds reviewed.  Patient seen and examined.    CC: Chest pain    Subjective: SP stress test. No chest pain or sob. Tolerating po and voiding well.     All other systems reviewed and found to be negative with the exception of what has been described above.    MEDICATIONS  (STANDING):  apixaban 5 milliGRAM(s) Oral every 12 hours  atorvastatin 20 milliGRAM(s) Oral at bedtime  dextrose 5%. 1000 milliLiter(s) (100 mL/Hr) IV Continuous <Continuous>  dextrose 5%. 1000 milliLiter(s) (50 mL/Hr) IV Continuous <Continuous>  dextrose 50% Injectable 25 Gram(s) IV Push once  dextrose 50% Injectable 12.5 Gram(s) IV Push once  dextrose 50% Injectable 25 Gram(s) IV Push once  diltiazem    milliGRAM(s) Oral daily  glucagon  Injectable 1 milliGRAM(s) IntraMuscular once  guaiFENesin  milliGRAM(s) Oral every 12 hours  insulin lispro (ADMELOG) corrective regimen sliding scale   SubCutaneous three times a day before meals  losartan 50 milliGRAM(s) Oral daily  metoprolol succinate ER 50 milliGRAM(s) Oral daily    MEDICATIONS  (PRN):  ALBUTerol    90 MICROgram(s) HFA Inhaler 2 Puff(s) Inhalation every 4 hours PRN Shortness of Breath and/or Wheezing  dextrose Oral Gel 15 Gram(s) Oral once PRN Blood Glucose LESS THAN 70 milliGRAM(s)/deciliter      VITALS:  T(F): 97.3 (10-04-22 @ 09:09), Max: 97.9 (10-04-22 @ 00:04)  HR: 62 (10-04-22 @ 09:09) (62 - 68)  BP: 133/90 (10-04-22 @ 09:09) (121/73 - 133/90)  RR: 17 (10-04-22 @ 09:09) (17 - 18)  SpO2: 98% (10-04-22 @ 09:09) (98% - 98%)  Wt(kg): --    I&O's Summary    03 Oct 2022 07:01  -  04 Oct 2022 07:00  --------------------------------------------------------  IN: 420 mL / OUT: 0 mL / NET: 420 mL        CAPILLARY BLOOD GLUCOSE      POCT Blood Glucose.: 86 mg/dL (04 Oct 2022 12:42)  POCT Blood Glucose.: 98 mg/dL (04 Oct 2022 06:19)  POCT Blood Glucose.: 107 mg/dL (03 Oct 2022 21:02)  POCT Blood Glucose.: 105 mg/dL (03 Oct 2022 16:51)      PHYSICAL EXAM:  GEN: NAD  HEENT:  pupils equal and reactive, EOMI, no oropharyngeal lesions, erythema, exudates, oral thrush  NECK:   supple, no carotid bruits, no palpable lymph nodes, no thyromegaly  CV:  +S1, +S2, regular, no murmurs or rubs  RESP:   lungs clear to auscultation bilaterally, no wheezing, rales, rhonchi, good air entry bilaterally  BREAST:  not examined  GI:  abdomen soft, non-tender, non-distended, normal BS, no bruits, no abdominal masses, no palpable masses  RECTAL:  not examined  :  not examined  MSK:   normal muscle tone, no atrophy, no rigidity, no contractions  EXT:  no clubbing, no cyanosis, no edema, no calf pain, swelling or erythema  VASCULAR:  pulses equal and symmetric in the upper and lower extremities  NEURO:  AAOX3, no focal neurological deficits, follows all commands, able to move extremities spontaneously  SKIN:  no ulcers, lesions or rashes      < from: TTE Echo Complete w/o Contrast w/ Doppler (09.29.22 @ 12:13) >   Summary     The mitral valve leaflets appear thickened.   Moderate (2+) mitral regurgitation is present.   Mild aortic sclerosis is present with normal valvular opening.   The tricuspid valve leaflets are thin and pliable; valve motion is   normal.   Moderate (2+)tricuspid valve regurgitation is present.   Mild pulmonary hypertension.   Pulmonic valve not well seen.   Trace pulmonic valvular regurgitation is present.   The left atrium is severely dilated.   Left ventricle systolic function appears preserved in the presence of a   cardiac arrhythmia. Left ventricle size and structure are within normal   limitations. Visual estimation of left ventricle ejection fraction is   50-55%.   A false tendon is noted near the left ventricular apex.   The right atrium appears moderately to severely dilated.   The right ventricle is mildly dilated.   IVC is dilated and not collapsing with inspiration.     Signature    < end of copied text >  < from: Xray Chest 2 Views PA/Lat (09.27.22 @ 11:33) >    IMPRESSION: Right middle lobe infiltrate.    --- End of Report ---      < end of copied text >

## 2022-10-04 NOTE — DISCHARGE NOTE PROVIDER - NSDCFUADDAPPT_GEN_ALL_CORE_FT
Called the [facility] to make a follow up appointment wasn't able to talk to anyone, left a message.

## 2022-10-04 NOTE — DISCHARGE NOTE PROVIDER - NSDCMRMEDTOKEN_GEN_ALL_CORE_FT
apixaban 5 mg oral tablet: 1 tab(s) orally 2 times a day   atorvastatin 20 mg oral tablet: 1 tab(s) orally once a day (at bedtime)  dilTIAZem 240 mg/24 hours oral capsule, extended release: 1 cap(s) orally once a day  losartan 50 mg oral tablet: 1 tab(s) orally once a day  metFORMIN 500 mg oral tablet: 1 tab(s) orally once a day  metoprolol succinate 50 mg oral tablet, extended release: 1 tab(s) orally once a day  ProAir HFA 90 mcg/inh inhalation aerosol: 2 puff(s) inhaled every 4 hours, As Needed -for bronchospasm   Voltaren 1% topical gel: Apply topically to affected area once a day, As Needed

## 2022-10-04 NOTE — CONSULT NOTE ADULT - SUBJECTIVE AND OBJECTIVE BOX
PCP:    REQUESTING PHYSICIAN:    REASON FOR CONSULT:    CHIEF COMPLAINT:    HPI:  · 57 y/o male with a PMHx of asthma, COVID, HTN, HLD, DM type II, presents to the ED c/o productive cough x2 weeks and dyspnea. He states that his dyspnea has gotten worse over the past few days. His cough is productive of yellow sputum  Patient was found to have Covid, Rt lobar Pna and new onset Afib. Cardiology called to evaluate arrhythmia. Pt denies any exertional symptoms prior to hospitalization. He denies hx of cardiac disease.           PAST MEDICAL & SURGICAL HISTORY:  No pertinent past medical history      HTN (hypertension)      High cholesterol      Asthma          Allergies    No Known Allergies    Intolerances        SOCIAL HISTORY:    FAMILY HISTORY:      MEDICATIONS:  MEDICATIONS  (STANDING):  apixaban 5 milliGRAM(s) Oral every 12 hours  atorvastatin 20 milliGRAM(s) Oral at bedtime  azithromycin   Tablet 500 milliGRAM(s) Oral daily  cefTRIAXone   IVPB 1000 milliGRAM(s) IV Intermittent every 24 hours  dexAMETHasone  Injectable 6 milliGRAM(s) IV Push daily  dextrose 5%. 1000 milliLiter(s) (100 mL/Hr) IV Continuous <Continuous>  dextrose 5%. 1000 milliLiter(s) (50 mL/Hr) IV Continuous <Continuous>  dextrose 50% Injectable 25 Gram(s) IV Push once  dextrose 50% Injectable 12.5 Gram(s) IV Push once  dextrose 50% Injectable 25 Gram(s) IV Push once  diltiazem    Tablet 30 milliGRAM(s) Oral every 6 hours  glucagon  Injectable 1 milliGRAM(s) IntraMuscular once  guaiFENesin  milliGRAM(s) Oral every 12 hours  insulin glargine Injectable (LANTUS) 10 Unit(s) SubCutaneous at bedtime  insulin lispro (ADMELOG) corrective regimen sliding scale   SubCutaneous three times a day before meals  losartan 100 milliGRAM(s) Oral daily    MEDICATIONS  (PRN):  ALBUTerol    90 MICROgram(s) HFA Inhaler 2 Puff(s) Inhalation every 4 hours PRN Shortness of Breath and/or Wheezing  dextrose Oral Gel 15 Gram(s) Oral once PRN Blood Glucose LESS THAN 70 milliGRAM(s)/deciliter      REVIEW OF SYSTEMS:    CONSTITUTIONAL: No weakness, fevers or chills  EYES/ENT: No visual changes;  No vertigo or throat pain   NECK: No pain or stiffness  RESPIRATORY: SOB   CARDIOVASCULAR: No chest pain or palpitations  GASTROINTESTINAL: No abdominal or epigastric pain. No nausea, vomiting, or hematemesis; No diarrhea or constipation. No melena or hematochezia.  GENITOURINARY: No dysuria, frequency or hematuria  NEUROLOGICAL: No numbness or weakness  SKIN: No itching, burning, rashes, or lesions   All other review of systems is negative unless indicated above    Vital Signs Last 24 Hrs  T(C): 36.6 (28 Sep 2022 07:28), Max: 97.2 (27 Sep 2022 20:43)  T(F): 97.8 (28 Sep 2022 07:28), Max: 206.9 (27 Sep 2022 20:43)  HR: 102 (28 Sep 2022 07:28) (80 - 140)  BP: 124/75 (28 Sep 2022 07:28) (124/53 - 144/109)  BP(mean): --  RR: 18 (28 Sep 2022 07:28) (16 - 20)  SpO2: 98% (28 Sep 2022 07:28) (96% - 100%)    Parameters below as of 28 Sep 2022 07:28  Patient On (Oxygen Delivery Method): room air        I&O's Summary    27 Sep 2022 07:01  -  28 Sep 2022 07:00  --------------------------------------------------------  IN: 1460 mL / OUT: 1350 mL / NET: 110 mL        PHYSICAL EXAM:    Constitutional: NAD, awake and alert, well-developed  HEENT: PERR, EOMI,  No oral cyananosis.  Neck:  supple,  No JVD  Respiratory: Breath sounds are clear bilaterally, No wheezing, rales or rhonchi  Cardiovascular: S1 and S2, regular rate and rhythm, no Murmurs, gallops or rubs  Gastrointestinal: Bowel Sounds present, soft, nontender.   Extremities: No peripheral edema. No clubbing or cyanosis.  Vascular: 2+ peripheral pulses  Neurological: A/O x 3, no focal deficits  Musculoskeletal: no calf tenderness.  Skin: No rashes.      LABS: All Labs Reviewed:                        16.0   28.12 )-----------( 296      ( 28 Sep 2022 04:27 )             47.3                         15.9   35.99 )-----------( 294      ( 27 Sep 2022 10:47 )             47.2     28 Sep 2022 04:27    140    |  108    |  21     ----------------------------<  219    3.4     |  24     |  0.93   27 Sep 2022 10:47    140    |  106    |  15     ----------------------------<  120    3.6     |  28     |  0.91     Ca    8.9        28 Sep 2022 04:27  Ca    9.1        27 Sep 2022 10:47    TPro  6.5    /  Alb  3.3    /  TBili  1.3    /  DBili  x      /  AST  14     /  ALT  57     /  AlkPhos  77     28 Sep 2022 04:27  TPro  6.7    /  Alb  3.7    /  TBili  1.4    /  DBili  x      /  AST  20     /  ALT  70     /  AlkPhos  78     27 Sep 2022 10:47          Blood Culture:   09-27 @ 10:47  Pro Bnp 1371        RADIOLOGY/EKG:< from: 12 Lead ECG (09.27.22 @ 12:49) >  Diagnosis Line Atrial fibrillation with rapid ventricular response with premature ventricular or aberrantly conducted complexes  Nonspecific T wave abnormality    When compared with ECG of 27-SEP-2022 12:48,  Normal sinus rhythm seen then      Confirmed by CLYDE THOMAS, RENETTA ([id number]) on 9/27/2022 5:41:45 PM    < end of copied text >      
 55 y/o male with a PMHx of asthma, COVID, HTN, HLD, DM type II, presents to the ED c/o productive cough x2 weeks and dyspnea. He states that his dyspnea has gotten worse over the past few days. His cough is productive of yellow sputum  Patient was found to have Covid, Rt lobar Pna and new onset Afib. Cardiology called to evaluate arrhythmia. Pt denies any exertional symptoms prior to hospitalization. He denies hx of cardiac disease.     9/29/'22: no complaints  9/30/22: Denies chest pain or shortness of breath. +cough yellow/white sputum (streak of blood last night); Tele: afib 70's; 40's during sleep  10/1/22 patient is feeling better , migrating chest discomfort , cough , in afib with controlled rate 80-90s PVCS   10/2/22 patient feeling better, some CP on inspiration with cough. AFIB on tele rate 60-70; bradycardiac when sleeping 40-50s  10/3/'22: no complaints  10/4/'22: no complaint      MEDICATIONS:  OUTPATIENT:  Home Medications:  Voltaren 1% topical gel: Apply topically to affected area once a day, As Needed (27 Sep 2022 17:53)      INPATIENT:  MEDICATIONS  (STANDING):  apixaban 5 milliGRAM(s) Oral every 12 hours  atorvastatin 20 milliGRAM(s) Oral at bedtime  dextrose 5%. 1000 milliLiter(s) (100 mL/Hr) IV Continuous <Continuous>  dextrose 5%. 1000 milliLiter(s) (50 mL/Hr) IV Continuous <Continuous>  dextrose 50% Injectable 25 Gram(s) IV Push once  dextrose 50% Injectable 12.5 Gram(s) IV Push once  dextrose 50% Injectable 25 Gram(s) IV Push once  diltiazem    milliGRAM(s) Oral daily  glucagon  Injectable 1 milliGRAM(s) IntraMuscular once  guaiFENesin  milliGRAM(s) Oral every 12 hours  insulin lispro (ADMELOG) corrective regimen sliding scale   SubCutaneous three times a day before meals  losartan 50 milliGRAM(s) Oral daily  metoprolol succinate ER 50 milliGRAM(s) Oral daily    MEDICATIONS  (PRN):  ALBUTerol    90 MICROgram(s) HFA Inhaler 2 Puff(s) Inhalation every 4 hours PRN Shortness of Breath and/or Wheezing  dextrose Oral Gel 15 Gram(s) Oral once PRN Blood Glucose LESS THAN 70 milliGRAM(s)/deciliter    MEDICATIONS  (PRN):  ALBUTerol    90 MICROgram(s) HFA Inhaler 2 Puff(s) Inhalation every 4 hours PRN Shortness of Breath and/or Wheezing  dextrose Oral Gel 15 Gram(s) Oral once PRN Blood Glucose LESS THAN 70 milliGRAM(s)/deciliter      REVIEW OF SYSTEMS:  ===============================  ===============================  CONSTITUTIONAL: No weakness, fevers or chills  EYES/ENT: No visual changes;  No vertigo or throat pain   NECK: No pain or stiffness  RESPIRATORY: No cough, wheezing, hemoptysis; No shortness of breath  CARDIOVASCULAR: No chest pain or palpitations  GASTROINTESTINAL: No abdominal or epigastric pain. No nausea, vomiting, or hematemesis;   No diarrhea or constipation. No melena or hematochezia.  GENITOURINARY: No dysuria, frequency or hematuria  NEUROLOGICAL: No numbness or weakness  SKIN: No itching, burning, rashes, or lesions   All other review of systems is negative unless indicated above    Vital Signs Last 24 Hrs  T(C): 36.4 (04 Oct 2022 16:39), Max: 36.6 (04 Oct 2022 00:04)  T(F): 97.5 (04 Oct 2022 16:39), Max: 97.9 (04 Oct 2022 00:04)  HR: 69 (04 Oct 2022 16:39) (62 - 69)  BP: 148/85 (04 Oct 2022 16:39) (121/73 - 148/85)  BP(mean): --  RR: 17 (04 Oct 2022 16:39) (17 - 18)  SpO2: 97% (04 Oct 2022 16:39) (97% - 98%)    Parameters below as of 04 Oct 2022 16:39  Patient On (Oxygen Delivery Method): room air        I&O's Summary    03 Oct 2022 07:01  -  04 Oct 2022 07:00  --------------------------------------------------------  IN: 420 mL / OUT: 0 mL / NET: 420 mL        I&O's Detail    03 Oct 2022 07:01  -  04 Oct 2022 07:00  --------------------------------------------------------  IN:    Oral Fluid: 420 mL  Total IN: 420 mL    OUT:  Total OUT: 0 mL    Total NET: 420 mL      PHYSICAL EXAM:    Constitutional: NAD, awake and alert, well-developed  HEENT: PERR, EOMI,  No oral cyanosis.  Neck:  supple,  No JVD  Respiratory: Breath sounds are clear bilaterally, No wheezing, rales or rhonchi  Cardiovascular: S1 and S2, r IR IR   Gastrointestinal: Bowel Sounds present, soft, nontender.   Extremities: No peripheral edema. No clubbing or cyanosis.  Vascular: 2+ peripheral pulses  Neurological: A/O x 3, no focal deficits  Musculoskeletal: no calf tenderness.  Skin: No rashes.    ===============================  ===============================  CARDIAC BIOMARKERS:  -------  -BNP VALUES:  09-27 @ 10:47  Pro Bnp 1371  12-15 @ 09:34  Pro Bnp 212    -------  -TROPONIN VALUES:   Troponin I, High Sensitivity Result: 4.52 ng/L (09-28-22 @ 04:27)  Troponin I, High Sensitivity Result: 5.67 ng/L (09-27-22 @ 20:13)  Troponin I, High Sensitivity Result: 5.96 ng/L (09-27-22 @ 15:27)  Troponin I, High Sensitivity Result: 5.72 ng/L (09-27-22 @ 10:47)  Troponin I, High Sensitivity Result: 9.45 ng/L (12-15-21 @ 09:34)  Troponin I, Serum: <0.015 ng/mL [0.015 - 0.045] (03-31-20 @ 07:16)       ===============================  ===============================    RADIOLOGY/EKG:< from: 12 Lead ECG (09.27.22 @ 12:49) >  Diagnosis Line Atrial fibrillation with rapid ventricular response with premature ventricular or aberrantly conducted complexes  Nonspecific T wave abnormality    When compared with ECG of 27-SEP-2022 12:48,  Normal sinus rhythm seen then      Confirmed by RENETTA TANG MD ([id number]) on 9/27/2022 5:41:45 PM    < end of copied text >      e< from: TTE Echo Complete w/o Contrast w/ Doppler (09.29.22 @ 12:13) >     Impression     Summary     The mitral valve leaflets appear thickened.   Moderate (2+) mitral regurgitation is present.   Mild aortic sclerosis is present with normal valvular opening.   The tricuspid valve leaflets are thin and pliable; valve motion is   normal.   Moderate (2+)tricuspid valve regurgitation is present.   Mild pulmonary hypertension.   Pulmonic valve not well seen.   Trace pulmonic valvular regurgitation is present.   The left atrium is severely dilated.   Left ventricle systolic function appears preserved in the presence of a   cardiac arrhythmia. Left ventricle size and structure are within normal   limitations. Visual estimation of left ventricle ejection fraction is   50-55%.   A false tendon is noted near the left ventricular apex.   The right atrium appears moderately to severely dilated.   The right ventricle is mildly dilated.   IVC is dilated and not collapsing with inspiration.      ===============================      NM Nuclear Stress Pharmacologic Multiple:   ACC: [id number] EXAM:  NM NUCLEAR STRESS MULTI PHARM                          PROCEDURE DATE:  10/04/2022          INTERPRETATION:  RADIOPHARMACEUTICAL: 30.1 mCi 99mTc-sestamibi IV at rest   on 10/3/2022 and 31.5 mCi 99mTc-sestamibi IV at stress on 10/4/2022    CLINICAL INFORMATION: 57 year old male  with chest pain and atrial   fibrillation, Covid positive; referred to evaluate cardiac function.    STRESS PROTOCOL: Intravenous Regadenoson 5cc (0.4 mg) was given rapidly   over 10 seconds. Technetium sestamibi was injected immediately   thereafter. A 12-lead EKG was recorded every minute and blood pressure   was also recorded throughout the test. The study was stopped when the   protocol was completed.    TECHNIQUE:  At rest, 30.1 mCi 99m Tc- sestamibi was injected   intravenously and gated SPECT myocardial perfusion imaging was performed   45 minutes later. Immediately following the intravenous injection of   Regadenoson, 31.5 mCi 99m-Technetium sestamibi was injected intravenously   and gated SPECT myocardial perfusion imaging was performed 45 minutes   later. Data were reconstructed in the cardiac standard short axis,   horizontal long axis and vertical long axis projections.    COMPARISON: No previous myocardial perfusion scan for comparison    FINDINGS: The technical quality of the resting and post-stress perfusion   images was satisfactory.  Review of resting and post-stress myocardial   scintigrams revealed small fixed defect in the apical segment of the   inferior wall of the left ventricle. There was no scan evidence of   reversible perfusion defects.    Gated wall motion study revealed normal left ventricular contractility.   There were no regional wall motion abnormalities or regions of decreased   wall thickening. There was no paradoxical septal motion.    Calculated left ventricular ejection fraction post-stress was 57%, based   on a left ventricular end diastolic volume of 125 mL and an end systolic   volume of 54 mL. Stroke volume was 71 mL.    IMPRESSION: SPECT Myocardial Perfusion Imaging demonstrates:    Small fixed perfusion defect in the apical segment of the inferior wall   of the left ventricle.    No scan evidence of reversible perfusion defects.    Normal left ventricular contractility with an ejection fraction of 57%   (Normal: 50% or greater).    No regional wall motion abnormalities.    Please refer to cardiac stress test report for dosage of Regadenoson   administered, EKG findings and symptoms during the procedure.    --- End of Report ---    ROULA DURÁN MD; Attending Nuclear Medicine  This document has been electronically signed. Oct  4 2022  4:37PM (10-04-22 @ 15:30)  ===============================    Abdulaziz Gayle M.D.  Cardiology, [facility]  Cell: [phone]  Office:   [phone] ([facility] Office)  [phone] ([facility] Office)    ===============================

## 2022-10-05 VITALS
SYSTOLIC BLOOD PRESSURE: 144 MMHG | HEART RATE: 71 BPM | DIASTOLIC BLOOD PRESSURE: 87 MMHG | TEMPERATURE: 98 F | OXYGEN SATURATION: 99 % | RESPIRATION RATE: 18 BRPM

## 2022-10-05 LAB
ANION GAP SERPL CALC-SCNC: 9 MMOL/L — SIGNIFICANT CHANGE UP (ref 5–17)
BUN SERPL-MCNC: 22 MG/DL — SIGNIFICANT CHANGE UP (ref 7–23)
CALCIUM SERPL-MCNC: 8.4 MG/DL — LOW (ref 8.5–10.1)
CHLORIDE SERPL-SCNC: 106 MMOL/L — SIGNIFICANT CHANGE UP (ref 96–108)
CO2 SERPL-SCNC: 24 MMOL/L — SIGNIFICANT CHANGE UP (ref 22–31)
CREAT SERPL-MCNC: 0.94 MG/DL — SIGNIFICANT CHANGE UP (ref 0.5–1.3)
EGFR: 93 ML/MIN/1.73M2 — SIGNIFICANT CHANGE UP
GLUCOSE SERPL-MCNC: 210 MG/DL — HIGH (ref 70–99)
HCT VFR BLD CALC: 49.6 % — SIGNIFICANT CHANGE UP (ref 39–50)
HGB BLD-MCNC: 16.8 G/DL — SIGNIFICANT CHANGE UP (ref 13–17)
MCHC RBC-ENTMCNC: 28.8 PG — SIGNIFICANT CHANGE UP (ref 27–34)
MCHC RBC-ENTMCNC: 33.9 GM/DL — SIGNIFICANT CHANGE UP (ref 32–36)
MCV RBC AUTO: 84.9 FL — SIGNIFICANT CHANGE UP (ref 80–100)
PLATELET # BLD AUTO: 259 K/UL — SIGNIFICANT CHANGE UP (ref 150–400)
POTASSIUM SERPL-MCNC: 3 MMOL/L — LOW (ref 3.5–5.3)
POTASSIUM SERPL-SCNC: 3 MMOL/L — LOW (ref 3.5–5.3)
RBC # BLD: 5.84 M/UL — HIGH (ref 4.2–5.8)
RBC # FLD: 13.9 % — SIGNIFICANT CHANGE UP (ref 10.3–14.5)
SODIUM SERPL-SCNC: 139 MMOL/L — SIGNIFICANT CHANGE UP (ref 135–145)
WBC # BLD: 11.67 K/UL — HIGH (ref 3.8–10.5)
WBC # FLD AUTO: 11.67 K/UL — HIGH (ref 3.8–10.5)

## 2022-10-05 PROCEDURE — 99239 HOSP IP/OBS DSCHRG MGMT >30: CPT

## 2022-10-05 RX ORDER — POTASSIUM CHLORIDE 20 MEQ
40 PACKET (EA) ORAL ONCE
Refills: 0 | Status: COMPLETED | OUTPATIENT
Start: 2022-10-05 | End: 2022-10-05

## 2022-10-05 RX ADMIN — Medication 600 MILLIGRAM(S): at 10:43

## 2022-10-05 RX ADMIN — APIXABAN 5 MILLIGRAM(S): 2.5 TABLET, FILM COATED ORAL at 10:42

## 2022-10-05 RX ADMIN — Medication 240 MILLIGRAM(S): at 10:43

## 2022-10-05 RX ADMIN — Medication 40 MILLIEQUIVALENT(S): at 12:49

## 2022-10-05 RX ADMIN — Medication 50 MILLIGRAM(S): at 10:43

## 2022-10-05 RX ADMIN — LOSARTAN POTASSIUM 50 MILLIGRAM(S): 100 TABLET, FILM COATED ORAL at 10:42

## 2022-10-05 NOTE — PROGRESS NOTE ADULT - ASSESSMENT
57 y/o man with DM, HTN, HLD, asthma, presented to the ED with 2 weeks of productive cough, yellowish sputum, and worsening dyspnea. In the ED he was found to have COVID19 infection and pneumonia particularly in the R middle lobe, unable to rule out bacterial pneumonia. He was also noted to have afib with RVR, new onset. He was given IV fluids, antibiotics, diltiazem, a dose of steroids and admitted to Medicine.     # COVID 19 infection, present upon admission  No fever. No hypoxia. No longer with dyspnea or cough.   - Continue supportive care measures - encourage incentive spirometry, OOB to chair daily, DVT px    # Unable to rule out bacterial lobar pneumonia of the right middle lobe, present upon admission  Clinically improved. Blood cultures negative to date. Completed 5-day course of ceftriaxone and azithromycin, 9/27-10/1.   - Monitor as needed    # New onset afib with RVR  Remains in afib but no longer in RVR. Rate well controlled on diltiazem CD 240mg daily and metoprolol succinate 50mg daily. On Eliquis for stroke risk-reduction.   - Continue diltiazem CD and metoprolol succinate, stop amlodipine and HCTZ  - Continue Eliquis - verified that medication is covered. Explained all risks and benefits of doac to patient in native language    # Low-normal LV systolic function, moderate MR and TR  -As noted on TTE this admission. Cardiology advises ischemic evaluation, planned for Monday.  - SP Nuclear stress- DC home    # HTN  BP controlled  - Continue metoprolol succinate, diltiazem CD and losartan    # HLD  Stable  - Continue atorvastatin    # DM  A1c 6.6. On Lispro correctional scale. Glucose 80s-100s.  - Continue Lispro    Disposition: DC home  
55 y/o man with DM, HTN, HLD, asthma, presented to the ED with 2 weeks of productive cough, yellowish sputum, and worsening dyspnea. In the ED he was found to have COVID19 infection and pneumonia particularly in the R middle lobe, unable to rule out bacterial pneumonia. He was also noted to have afib with RVR, new onset. He was given IV fluids, antibiotics, diltiazem, a dose of steroids and admitted to Medicine.     # COVID 19 infection, present upon admission  No fever. No hypoxia. No longer with dyspnea or cough.   - Continue supportive care measures - encourage incentive spirometry, OOB to chair daily, DVT px    # Unable to rule out bacterial lobar pneumonia of the right middle lobe, present upon admission  Clinically improved. Blood cultures negative to date. Completed 5-day course of ceftriaxone and azithromycin, 9/27-10/1.   - Monitor as needed    # New onset afib with RVR  Remains in afib but no longer in RVR. Rate well controlled on diltiazem CD 240mg daily and metoprolol succinate 50mg daily. On Eliquis for stroke risk-reduction.   - Continue diltiazem CD and metoprolol succinate, stop amlodipine and HCTZ  - Continue Eliquis - verified that medication is covered. Explained all risks and benefits of doac to patient in native language    # Low-normal LV systolic function, moderate MR and TR  As noted on TTE this admission. Cardiology advises ischemic evaluation, planned for Monday.  SP Nuclear stress today, if negative DC home    # HTN  BP controlled  - Continue metoprolol succinate, diltiazem CD and losartan    # HLD  Stable  - Continue atorvastatin    # DM  A1c 6.6. On Lispro correctional scale. Glucose 80s-100s.  - Continue Lispro    Disposition: DC home pending Nuclear stress test
57 y/o man with DM, HTN, HLD, asthma, presented to the ED with 2 weeks of productive cough, yellowish sputum, and worsening dyspnea. In the ED he was found to have COVID19 infection and pneumonia particularly in the R middle lobe, unable to rule out bacterial pneumonia. He was also noted to have afib with RVR, new onset. He was given IV fluids, antibiotics, diltiazem, a dose of steroids and admitted to Medicine.     COVID 19 infection, present upon admission  No fever. No hypoxia. No longer with dyspnea or cough.   - Continue supportive care measures - encourage incentive spirometry, OOB to chair daily, DVT px    Unable to rule out bacterial lobar pneumonia of the right middle lobe, present upon admission  Clinically improved. Blood cultures negative to date. Completed 5-day course of ceftriaxone and azithromycin, 9/27-10/1.   - Monitor as needed    New onset afib with RVR  Remains in afib but no longer in RVR. Rate well controlled on diltiazem CD 240mg daily and metoprolol succinate 50mg daily. On Eliquis for stroke risk-reduction.   - Continue diltiazem CD and metoprolol succinate, stop amlodipine and HCTZ  - Continue Eliquis - verified that medication is covered. Explained all risks and benefits of doac to patient in native language    Low-normal LV systolic function, moderate MR and TR  As noted on TTE this admission. Cardiology advises ischemic evaluation, planned for Monday.  - NPO except meds after midnight for NM stress testing Monday- if negative hopeful for dc with [facility] follow up appt in 10 days    HTN  BP controlled  - Continue metoprolol succinate, diltiazem CD and losartan    HLD  Stable  - Continue atorvastatin    DM  A1c 6.6. On Lispro correctional scale. Glucose 80s-100s.  - Continue Lispro       
55 y/o man with DM, HTN, HLD, asthma, presented to the ED with 2 weeks of productive cough, yellowish sputum, and worsening dyspnea. In the ED he was found to have COVID19 infection and pneumonia particularly in the R middle lobe, unable to rule out bacterial pneumonia. He was also noted to have afib with RVR, new onset. He was given IV fluids, antibiotics, diltiazem, a dose of steroids and admitted to Medicine.     COVID 19 infection, present upon admission  No fever. No hypoxia. No longer with dyspnea or cough.   - Continue supportive care measures - encourage incentive spirometry, OOB to chair daily, DVT px    Unable to rule out bacterial lobar pneumonia of the right middle lobe, present upon admission  Clinically improved. Blood cultures negative to date. Completed 5-day course of ceftriaxone and azithromycin, 9/27-10/1.   - Monitor as needed    New onset afib with RVR  Remains in afib but no longer in RVR. Rate well controlled on diltiazem CD 240mg daily and metoprolol succinate 50mg daily. On Eliquis for stroke risk-reduction.   - Continue diltiazem CD and metoprolol succinate  - Continue Eliquis    Low-normal LV systolic function, moderate MR and TR  As noted on TTE this admission. Cardiology advises ischemic evaluation, planned for Monday.  - NPO except meds after midnight Sunday for NM stress testing Monday    HTN  BP controlled  - Continue metoprolol succinate, diltiazem CD and losartan    HLD  Stable  - Continue atorvastatin    DM  A1c 6.6. On Lispro correctional scale. Glucose 80s-100s.  - Continue Lispro       Dispo: Anticipate DC home, possible Monday 10/3, pending stress testing
57 y/o man with DM, HTN, HLD, asthma, presented to the ED with 2 weeks of productive cough, yellowish sputum, and worsening dyspnea. In the ED he was found to have COVID19 infection and pneumonia particularly in the R middle lobe, unable to rule out bacterial pneumonia. He was also noted to have afib with RVR, new onset. He was given IV fluids, antibiotics, diltiazem, a dose of steroids and admitted to Medicine.     COVID 19 infection, present upon admission  No fever. No hypoxia. No longer with dyspnea or cough.   - Continue supportive care measures - encourage incentive spirometry, OOB to chair daily, DVT px    Unable to rule out bacterial lobar pneumonia of the right middle lobe, present upon admission  Clinically improved. Blood cultures negative to date. Completed 5-day course of ceftriaxone and azithromycin, 9/27-10/1.   - Monitor as needed    New onset afib with RVR  Remains in afib but no longer in RVR. Rate well controlled on diltiazem CD 240mg daily and metoprolol succinate 50mg daily. On Eliquis for stroke risk-reduction.   - Continue diltiazem CD and metoprolol succinate  - Continue Eliquis    Low-normal LV systolic function, moderate MR and TR  As noted on TTE this admission. Cardiology advises ischemic evaluation, planned for Monday.  - NPO except meds after midnight for NM stress testing Monday    HTN  BP controlled  - Continue metoprolol succinate, diltiazem CD and losartan    HLD  Stable  - Continue atorvastatin    DM  A1c 6.6. On Lispro correctional scale. Glucose 80s-100s.  - Continue Lispro       Dispo: Anticipate DC home, possible Monday 10/3, pending stress testing

## 2022-10-05 NOTE — PROGRESS NOTE ADULT - PROVIDER SPECIALTY LIST ADULT
Hospitalist
Cardiology
Hospitalist
Cardiology
Cardiology
Hospitalist
Cardiology
Cardiology

## 2022-10-05 NOTE — PROGRESS NOTE ADULT - SUBJECTIVE AND OBJECTIVE BOX
HOSPITALIST ATTENDING PROGRESS NOTE    Chart and meds reviewed.  Patient seen and examined.    CC: chest pain    Subjective: Patient feels well, no acute issues overnight. No chest pain or sob.     All other systems reviewed and found to be negative with the exception of what has been described above.    MEDICATIONS  (STANDING):  apixaban 5 milliGRAM(s) Oral every 12 hours  atorvastatin 20 milliGRAM(s) Oral at bedtime  dextrose 5%. 1000 milliLiter(s) (100 mL/Hr) IV Continuous <Continuous>  dextrose 5%. 1000 milliLiter(s) (50 mL/Hr) IV Continuous <Continuous>  dextrose 50% Injectable 25 Gram(s) IV Push once  dextrose 50% Injectable 12.5 Gram(s) IV Push once  dextrose 50% Injectable 25 Gram(s) IV Push once  diltiazem    milliGRAM(s) Oral daily  glucagon  Injectable 1 milliGRAM(s) IntraMuscular once  guaiFENesin  milliGRAM(s) Oral every 12 hours  insulin lispro (ADMELOG) corrective regimen sliding scale   SubCutaneous three times a day before meals  losartan 50 milliGRAM(s) Oral daily  metoprolol succinate ER 50 milliGRAM(s) Oral daily    MEDICATIONS  (PRN):  ALBUTerol    90 MICROgram(s) HFA Inhaler 2 Puff(s) Inhalation every 4 hours PRN Shortness of Breath and/or Wheezing  dextrose Oral Gel 15 Gram(s) Oral once PRN Blood Glucose LESS THAN 70 milliGRAM(s)/deciliter      VITALS:  T(F): 97.8 (10-05-22 @ 07:55), Max: 97.9 (10-04-22 @ 22:27)  HR: 71 (10-05-22 @ 07:55) (69 - 71)  BP: 144/87 (10-05-22 @ 07:55) (113/71 - 148/85)  RR: 18 (10-05-22 @ 07:55) (17 - 18)  SpO2: 99% (10-05-22 @ 07:55) (97% - 99%)      CAPILLARY BLOOD GLUCOSE      POCT Blood Glucose.: 99 mg/dL (05 Oct 2022 06:34)  POCT Blood Glucose.: 102 mg/dL (04 Oct 2022 22:17)  POCT Blood Glucose.: 86 mg/dL (04 Oct 2022 12:42)      PHYSICAL EXAM:  GEN: Obese  HEENT:  pupils equal and reactive, EOMI, no oropharyngeal lesions, erythema, exudates, oral thrush  NECK:   supple, no carotid bruits, no palpable lymph nodes, no thyromegaly  CV:  +S1, +S2, regular, no murmurs or rubs  RESP:   lungs clear to auscultation bilaterally, no wheezing, rales, rhonchi, good air entry bilaterally  BREAST:  not examined  GI:  abdomen soft, non-tender, non-distended, normal BS, no bruits, no abdominal masses, no palpable masses  RECTAL:  not examined  :  not examined  MSK:   normal muscle tone, no atrophy, no rigidity, no contractions  EXT:  no clubbing, no cyanosis, no edema, no calf pain, swelling or erythema  VASCULAR:  pulses equal and symmetric in the upper and lower extremities  NEURO:  AAOX3, no focal neurological deficits, follows all commands, able to move extremities spontaneously  SKIN:  no ulcers, lesions or rashes    LABS:                            16.8   11.67 )-----------( 259      ( 05 Oct 2022 08:36 )             49.6     10-05    139  |  106  |  22  ----------------------------<  210<H>  3.0<L>   |  24  |  0.94      < from: NM Nuclear Stress Pharmacologic Multiple (10.04.22 @ 15:30) >  IMPRESSION: SPECT Myocardial Perfusion Imaging demonstrates:    Small fixed perfusion defect in the apical segment of the inferior wall   of the left ventricle.    No scan evidence of reversible perfusion defects.    Normal left ventricular contractility with an ejection fraction of 57%   (Normal: 50% or greater).    No regional wall motion abnormalities.    Please refer to cardiac stress test report for dosage of Regadenoson   administered, EKG findings and symptoms during the procedure.    --- End of Report ---      < end of copied text >      Ca    8.4<L>      05 Oct 2022 08:36

## 2022-10-13 DIAGNOSIS — J45.909 UNSPECIFIED ASTHMA, UNCOMPLICATED: ICD-10-CM

## 2022-10-13 DIAGNOSIS — J06.9 ACUTE UPPER RESPIRATORY INFECTION, UNSPECIFIED: ICD-10-CM

## 2022-10-13 DIAGNOSIS — E78.5 HYPERLIPIDEMIA, UNSPECIFIED: ICD-10-CM

## 2022-10-13 DIAGNOSIS — I10 ESSENTIAL (PRIMARY) HYPERTENSION: ICD-10-CM

## 2022-10-13 DIAGNOSIS — I48.0 PAROXYSMAL ATRIAL FIBRILLATION: ICD-10-CM

## 2022-10-13 DIAGNOSIS — J12.82 PNEUMONIA DUE TO CORONAVIRUS DISEASE 2019: ICD-10-CM

## 2022-10-13 DIAGNOSIS — J15.9 UNSPECIFIED BACTERIAL PNEUMONIA: ICD-10-CM

## 2022-10-13 DIAGNOSIS — U07.1 COVID-19: ICD-10-CM

## 2022-10-13 DIAGNOSIS — I08.1 RHEUMATIC DISORDERS OF BOTH MITRAL AND TRICUSPID VALVES: ICD-10-CM

## 2022-10-13 DIAGNOSIS — B97.89 OTHER VIRAL AGENTS AS THE CAUSE OF DISEASES CLASSIFIED ELSEWHERE: ICD-10-CM

## 2022-10-13 DIAGNOSIS — B97.10 UNSPECIFIED ENTEROVIRUS AS THE CAUSE OF DISEASES CLASSIFIED ELSEWHERE: ICD-10-CM

## 2022-10-13 DIAGNOSIS — E11.9 TYPE 2 DIABETES MELLITUS WITHOUT COMPLICATIONS: ICD-10-CM

## 2022-10-13 DIAGNOSIS — Z86.16 PERSONAL HISTORY OF COVID-19: ICD-10-CM

## 2023-01-23 ENCOUNTER — APPOINTMENT (OUTPATIENT)
Dept: UROLOGY | Facility: CLINIC | Age: 58
End: 2023-01-23
Payer: COMMERCIAL

## 2023-01-23 VITALS
WEIGHT: 227 LBS | HEIGHT: 66 IN | TEMPERATURE: 98.3 F | BODY MASS INDEX: 36.48 KG/M2 | SYSTOLIC BLOOD PRESSURE: 154 MMHG | DIASTOLIC BLOOD PRESSURE: 96 MMHG | RESPIRATION RATE: 15 BRPM | OXYGEN SATURATION: 95 % | HEART RATE: 80 BPM

## 2023-01-23 PROCEDURE — 99204 OFFICE O/P NEW MOD 45 MIN: CPT

## 2023-01-23 RX ORDER — TAMSULOSIN HYDROCHLORIDE 0.4 MG/1
0.4 CAPSULE ORAL
Qty: 14 | Refills: 0 | Status: ACTIVE | OUTPATIENT
Start: 2023-01-23

## 2023-01-23 NOTE — END OF VISIT
[FreeTextEntry3] : A PSA and urine are sent. He was given samples of Tamsulosin. He will follow up with a TRUSP in 2 weeks.

## 2023-01-23 NOTE — PHYSICAL EXAM
[General Appearance - Well Developed] : well developed [General Appearance - Well Nourished] : well nourished [Normal Appearance] : normal appearance [Well Groomed] : well groomed [General Appearance - In No Acute Distress] : no acute distress [Edema] : no peripheral edema [Respiration, Rhythm And Depth] : normal respiratory rhythm and effort [Exaggerated Use Of Accessory Muscles For Inspiration] : no accessory muscle use [Abdomen Soft] : soft [Abdomen Tenderness] : non-tender [Costovertebral Angle Tenderness] : no ~M costovertebral angle tenderness [Urinary Bladder Findings] : the bladder was normal on palpation [Urethral Meatus] : meatus normal [Scrotum] : the scrotum was normal [Testes Mass (___cm)] : there were no testicular masses [No Prostate Nodules] : no prostate nodules [Normal Station and Gait] : the gait and station were normal for the patient's age [] : no rash [No Focal Deficits] : no focal deficits [Oriented To Time, Place, And Person] : oriented to person, place, and time [Affect] : the affect was normal [Mood] : the mood was normal [Not Anxious] : not anxious [No Palpable Adenopathy] : no palpable adenopathy [FreeTextEntry1] : Normal male genitalia. The prostate gland is small to moderate size and palpably benign.

## 2023-01-23 NOTE — REVIEW OF SYSTEMS
[Chest Pain] : chest pain [Shortness Of Breath] : shortness of breath [Cough] : cough [Wheezing] : wheezing [Abdominal Pain] : abdominal pain [Wake up at night to urinate  How many times?  ___] : wakes up to urinate [unfilled] times during the night [Joint Pain] : joint pain [Joint Swelling] : joint swelling [Limb Swelling] : limb swelling [Difficulty Walking] : difficulty walking [see HPI] : see HPI [Negative] : Psychiatric [FreeTextEntry3] : sore throat  [FreeTextEntry2] : high blood pressure

## 2023-01-23 NOTE — HISTORY OF PRESENT ILLNESS
[FreeTextEntry1] : 57M presents today with a CC of increased urinary frequency and nocturia. Pt has noted a gradual onset of irritating urinating symptoms. He has problems with both daytime frequency and slowing of the stream as well as nocturia.

## 2023-01-24 LAB
APPEARANCE: CLEAR
BACTERIA: NEGATIVE
BILIRUBIN URINE: NEGATIVE
BLOOD URINE: NEGATIVE
COLOR: NORMAL
GLUCOSE QUALITATIVE U: NEGATIVE
HYALINE CASTS: 0 /LPF
KETONES URINE: NEGATIVE
LEUKOCYTE ESTERASE URINE: NEGATIVE
MICROSCOPIC-UA: NORMAL
NITRITE URINE: NEGATIVE
PH URINE: 7
PROTEIN URINE: NEGATIVE
RED BLOOD CELLS URINE: 0 /HPF
SPECIFIC GRAVITY URINE: 1.01
SQUAMOUS EPITHELIAL CELLS: 0 /HPF
UROBILINOGEN URINE: NORMAL
WHITE BLOOD CELLS URINE: 0 /HPF

## 2023-01-25 LAB
PSA SERPL-MCNC: 0.33 NG/ML
URINE CYTOLOGY: NORMAL

## 2023-02-07 ENCOUNTER — APPOINTMENT (OUTPATIENT)
Dept: UROLOGY | Facility: CLINIC | Age: 58
End: 2023-02-07
Payer: MEDICAID

## 2023-02-07 VITALS
HEIGHT: 65 IN | RESPIRATION RATE: 14 BRPM | DIASTOLIC BLOOD PRESSURE: 94 MMHG | SYSTOLIC BLOOD PRESSURE: 147 MMHG | TEMPERATURE: 98.2 F | OXYGEN SATURATION: 94 % | HEART RATE: 88 BPM | BODY MASS INDEX: 38.32 KG/M2 | WEIGHT: 230 LBS

## 2023-02-07 DIAGNOSIS — N50.811 RIGHT TESTICULAR PAIN: ICD-10-CM

## 2023-02-07 PROCEDURE — 76857 US EXAM PELVIC LIMITED: CPT

## 2023-02-07 PROCEDURE — 51741 ELECTRO-UROFLOWMETRY FIRST: CPT

## 2023-02-07 PROCEDURE — 99213 OFFICE O/P EST LOW 20 MIN: CPT | Mod: 25

## 2023-02-07 PROCEDURE — 76872 US TRANSRECTAL: CPT

## 2023-02-07 RX ORDER — TAMSULOSIN HYDROCHLORIDE 0.4 MG/1
0.4 CAPSULE ORAL
Qty: 180 | Refills: 3 | Status: ACTIVE | COMMUNITY
Start: 2023-02-07 | End: 1900-01-01

## 2023-02-08 NOTE — PHYSICAL EXAM
[General Appearance - Well Developed] : well developed [General Appearance - Well Nourished] : well nourished [Normal Appearance] : normal appearance [Well Groomed] : well groomed [General Appearance - In No Acute Distress] : no acute distress [Abdomen Soft] : soft [Abdomen Tenderness] : non-tender [Costovertebral Angle Tenderness] : no ~M costovertebral angle tenderness [Urethral Meatus] : meatus normal [Urinary Bladder Findings] : the bladder was normal on palpation [Scrotum] : the scrotum was normal [Testes Mass (___cm)] : there were no testicular masses [No Prostate Nodules] : no prostate nodules [FreeTextEntry1] : Normal male genitalia. The prostate gland is small to moderate size and palpably benign.  [Edema] : no peripheral edema [] : no respiratory distress [Respiration, Rhythm And Depth] : normal respiratory rhythm and effort [Exaggerated Use Of Accessory Muscles For Inspiration] : no accessory muscle use [Oriented To Time, Place, And Person] : oriented to person, place, and time [Affect] : the affect was normal [Mood] : the mood was normal [Not Anxious] : not anxious [Normal Station and Gait] : the gait and station were normal for the patient's age [No Focal Deficits] : no focal deficits [No Palpable Adenopathy] : no palpable adenopathy

## 2023-02-08 NOTE — REVIEW OF SYSTEMS
[Chest Pain] : chest pain [Shortness Of Breath] : shortness of breath [Cough] : cough [Wheezing] : wheezing [Abdominal Pain] : abdominal pain [see HPI] : see HPI [Wake up at night to urinate  How many times?  ___] : wakes up to urinate [unfilled] times during the night [Joint Pain] : joint pain [Joint Swelling] : joint swelling [Limb Swelling] : limb swelling [Difficulty Walking] : difficulty walking [Negative] : Heme/Lymph [FreeTextEntry3] : sore throat  [FreeTextEntry2] : high blood pressure

## 2023-02-08 NOTE — HISTORY OF PRESENT ILLNESS
[FreeTextEntry1] : 57M presents today with a CC of testicular discomfort. Pt is here for a US of the prostate. He also notes right esticular discomfort.

## 2023-02-14 ENCOUNTER — OUTPATIENT (OUTPATIENT)
Dept: OUTPATIENT SERVICES | Facility: HOSPITAL | Age: 58
LOS: 1 days | Discharge: HOME | End: 2023-02-14

## 2023-02-14 VITALS
WEIGHT: 235.01 LBS | OXYGEN SATURATION: 98 % | TEMPERATURE: 97 F | HEIGHT: 65 IN | DIASTOLIC BLOOD PRESSURE: 95 MMHG | SYSTOLIC BLOOD PRESSURE: 155 MMHG | HEART RATE: 84 BPM | RESPIRATION RATE: 23 BRPM

## 2023-02-14 DIAGNOSIS — Z12.11 ENCOUNTER FOR SCREENING FOR MALIGNANT NEOPLASM OF COLON: ICD-10-CM

## 2023-02-14 RX ORDER — DICLOFENAC SODIUM 30 MG/G
1 GEL TOPICAL
Qty: 0 | Refills: 0 | DISCHARGE

## 2023-02-20 DIAGNOSIS — I48.91 UNSPECIFIED ATRIAL FIBRILLATION: ICD-10-CM

## 2023-02-20 DIAGNOSIS — Z12.11 ENCOUNTER FOR SCREENING FOR MALIGNANT NEOPLASM OF COLON: ICD-10-CM

## 2023-02-20 DIAGNOSIS — J45.909 UNSPECIFIED ASTHMA, UNCOMPLICATED: ICD-10-CM

## 2023-02-20 DIAGNOSIS — I10 ESSENTIAL (PRIMARY) HYPERTENSION: ICD-10-CM

## 2023-02-20 DIAGNOSIS — Z79.01 LONG TERM (CURRENT) USE OF ANTICOAGULANTS: ICD-10-CM

## 2023-02-20 DIAGNOSIS — E11.9 TYPE 2 DIABETES MELLITUS WITHOUT COMPLICATIONS: ICD-10-CM

## 2023-02-21 PROBLEM — I48.91 UNSPECIFIED ATRIAL FIBRILLATION: Chronic | Status: ACTIVE | Noted: 2023-02-14

## 2023-02-23 ENCOUNTER — NON-APPOINTMENT (OUTPATIENT)
Age: 58
End: 2023-02-23

## 2023-02-23 ENCOUNTER — APPOINTMENT (OUTPATIENT)
Dept: SURGERY | Facility: CLINIC | Age: 58
End: 2023-02-23
Payer: MEDICAID

## 2023-02-23 DIAGNOSIS — K42.9 UMBILICAL HERNIA W/OUT OBSTRUCTION OR GANGRENE: ICD-10-CM

## 2023-02-23 PROCEDURE — 99202 OFFICE O/P NEW SF 15 MIN: CPT

## 2023-02-23 NOTE — PHYSICAL EXAM
[JVD] : no jugular venous distention  [Normal Breath Sounds] : Normal breath sounds [Normal Heart Sounds] : normal heart sounds [Abdomen Tenderness] : ~T ~M Abdominal tenderness [Tender] : was nontender [Enlarged] : not enlarged [No Rash or Lesion] : No rash or lesion [Alert] : alert [Oriented to Person] : oriented to person [Oriented to Place] : oriented to place [Oriented to Time] : oriented to time [Calm] : calm [de-identified] : well appearing [de-identified] : obese, + diastsis, reducible umbilical hernia\par mild diffused tenderness\par no rebound or guarding

## 2023-02-23 NOTE — HISTORY OF PRESENT ILLNESS
[de-identified] : patient w hx of asthma ahd HTN\par obesity\par hx of heavy lifting\par presents with umbilical lump started in 2020 \par also non specific abd pain and testicular pain he rainey been following with urology\par denies constipation, N/V\par denies fever or chills\par  [de-identified] : \par

## 2023-02-23 NOTE — REASON FOR VISIT
[Consultation] : a consultation visit [Family Member] : family member [FreeTextEntry1] : umbilical hernia

## 2023-02-23 NOTE — REVIEW OF SYSTEMS
[Fever] : no fever [Chills] : no chills [Eye Pain] : no eye pain [Red Eyes] : eyes not red [Heart Rate Is Slow] : the heart rate was not slow [Heart Rate Is Fast] : the heart rate was not fast [Chest Pain] : no chest pain [Palpitations] : no palpitations [Shortness Of Breath] : no shortness of breath [Wheezing] : no wheezing [Abdominal Pain] : abdominal pain [Vomiting] : no vomiting [Constipation] : no constipation [Diarrhea] : no diarrhea

## 2023-02-23 NOTE — ASSESSMENT
[FreeTextEntry1] : chronic reducible umbilical hernia w/po obstructive sx\par testicular pain following with urology\par he has testicular US scheduled\par will see him after testicular US\par will need PCP for clearance\par will plan for robotic umbilical hernia repair w mesh pending above\par

## 2023-02-23 NOTE — HISTORY OF PRESENT ILLNESS
[de-identified] : patient w hx of asthma ahd HTN\par obesity\par hx of heavy lifting\par presents with umbilical lump started in 2020 \par also non specific abd pain and testicular pain he rainey been following with urology\par denies constipation, N/V\par denies fever or chills\par  [de-identified] : \par

## 2023-02-23 NOTE — PHYSICAL EXAM
[JVD] : no jugular venous distention  [Normal Breath Sounds] : Normal breath sounds [Normal Heart Sounds] : normal heart sounds [Abdomen Tenderness] : ~T ~M Abdominal tenderness [Tender] : was nontender [Enlarged] : not enlarged [No Rash or Lesion] : No rash or lesion [Alert] : alert [Oriented to Person] : oriented to person [Oriented to Place] : oriented to place [Oriented to Time] : oriented to time [Calm] : calm [de-identified] : well appearing [de-identified] : obese, + diastsis, reducible umbilical hernia\par mild diffused tenderness\par no rebound or guarding

## 2023-03-02 ENCOUNTER — APPOINTMENT (OUTPATIENT)
Dept: CT IMAGING | Facility: CLINIC | Age: 58
End: 2023-03-02
Payer: MEDICAID

## 2023-03-02 ENCOUNTER — OUTPATIENT (OUTPATIENT)
Dept: OUTPATIENT SERVICES | Facility: HOSPITAL | Age: 58
LOS: 1 days | End: 2023-03-02
Payer: MEDICAID

## 2023-03-02 ENCOUNTER — APPOINTMENT (OUTPATIENT)
Dept: ULTRASOUND IMAGING | Facility: CLINIC | Age: 58
End: 2023-03-02
Payer: MEDICAID

## 2023-03-02 DIAGNOSIS — R10.33 PERIUMBILICAL PAIN: ICD-10-CM

## 2023-03-02 DIAGNOSIS — Z00.8 ENCOUNTER FOR OTHER GENERAL EXAMINATION: ICD-10-CM

## 2023-03-02 DIAGNOSIS — N50.811 RIGHT TESTICULAR PAIN: ICD-10-CM

## 2023-03-02 PROCEDURE — 93975 VASCULAR STUDY: CPT | Mod: 26

## 2023-03-02 PROCEDURE — 74177 CT ABD & PELVIS W/CONTRAST: CPT | Mod: 26

## 2023-03-02 PROCEDURE — 74177 CT ABD & PELVIS W/CONTRAST: CPT

## 2023-03-02 PROCEDURE — 93975 VASCULAR STUDY: CPT

## 2023-03-03 ENCOUNTER — RX CHANGE (OUTPATIENT)
Age: 58
End: 2023-03-03

## 2023-03-03 RX ORDER — BETHANECHOL CHLORIDE 50 MG/1
50 TABLET ORAL
Qty: 180 | Refills: 2 | Status: ACTIVE | COMMUNITY
Start: 2023-03-03 | End: 1900-01-01

## 2023-03-03 RX ORDER — BETHANECHOL CHLORIDE 50 MG/1
50 TABLET ORAL
Qty: 60 | Refills: 5 | Status: DISCONTINUED | COMMUNITY
Start: 2023-02-07 | End: 2023-03-03

## 2023-03-09 ENCOUNTER — APPOINTMENT (OUTPATIENT)
Dept: SURGERY | Facility: CLINIC | Age: 58
End: 2023-03-09
Payer: MEDICAID

## 2023-03-09 VITALS
SYSTOLIC BLOOD PRESSURE: 148 MMHG | WEIGHT: 233 LBS | DIASTOLIC BLOOD PRESSURE: 94 MMHG | BODY MASS INDEX: 38.82 KG/M2 | HEIGHT: 65 IN

## 2023-03-09 PROCEDURE — 99212 OFFICE O/P EST SF 10 MIN: CPT

## 2023-03-09 NOTE — PLAN
[FreeTextEntry1] : Laparoscopic/Robotic possible open umbilical and b/l inguinal hernia repair w Mesh\par I explained the procedure risks benefits and alternatives and answered all the questions answered\par needs medical clearance\par Preop labs\par EKG\par Chest xray\par date either end of March or early April

## 2023-03-09 NOTE — HISTORY OF PRESENT ILLNESS
[de-identified] : 58 yo M w hx of asthma presents w abd discomfort [de-identified] : CT scan showed small umbilical hernia and bilateral small inguinal hernias\par his discomfort is in all areas\par no obstruction sx\par he does do heavy lifting\par

## 2023-03-09 NOTE — REASON FOR VISIT
[Follow-Up: _____] : a [unfilled] follow-up visit [FreeTextEntry1] : known to us abd discomfort and hernia Referred To Plastics For Closure Text (Leave Blank If You Do Not Want): After obtaining clear surgical margins the patient was sent to on-site plastics for surgical repair.  The patient understands they will receive post-surgical care instructions and follow-up from the plastic surgeon.

## 2023-03-09 NOTE — PHYSICAL EXAM
[Wheezing] : wheezing was heard [Abdominal Masses] : Abdominal mass present [de-identified] : well appearing [de-identified] : umbilical hernia, groin exam is limited due to body habitus

## 2023-03-16 ENCOUNTER — APPOINTMENT (OUTPATIENT)
Dept: UROLOGY | Facility: CLINIC | Age: 58
End: 2023-03-16
Payer: MEDICAID

## 2023-03-16 ENCOUNTER — NON-APPOINTMENT (OUTPATIENT)
Age: 58
End: 2023-03-16

## 2023-03-16 VITALS
WEIGHT: 233 LBS | SYSTOLIC BLOOD PRESSURE: 140 MMHG | HEIGHT: 65 IN | BODY MASS INDEX: 38.82 KG/M2 | DIASTOLIC BLOOD PRESSURE: 80 MMHG

## 2023-03-16 PROCEDURE — 51728 CYSTOMETROGRAM W/VP: CPT

## 2023-03-16 PROCEDURE — 51797 INTRAABDOMINAL PRESSURE TEST: CPT

## 2023-03-16 PROCEDURE — 51784 ANAL/URINARY MUSCLE STUDY: CPT

## 2023-03-16 PROCEDURE — 51741 ELECTRO-UROFLOWMETRY FIRST: CPT

## 2023-03-16 PROCEDURE — 51798 US URINE CAPACITY MEASURE: CPT | Mod: 59

## 2023-03-22 ENCOUNTER — OUTPATIENT (OUTPATIENT)
Dept: OUTPATIENT SERVICES | Facility: HOSPITAL | Age: 58
LOS: 1 days | End: 2023-03-22
Payer: MEDICAID

## 2023-03-22 ENCOUNTER — APPOINTMENT (OUTPATIENT)
Dept: RADIOLOGY | Facility: CLINIC | Age: 58
End: 2023-03-22
Payer: MEDICAID

## 2023-03-22 DIAGNOSIS — K42.9 UMBILICAL HERNIA WITHOUT OBSTRUCTION OR GANGRENE: ICD-10-CM

## 2023-03-22 PROCEDURE — 71046 X-RAY EXAM CHEST 2 VIEWS: CPT | Mod: 26

## 2023-03-22 PROCEDURE — 71046 X-RAY EXAM CHEST 2 VIEWS: CPT

## 2023-03-25 ENCOUNTER — LABORATORY RESULT (OUTPATIENT)
Age: 58
End: 2023-03-25

## 2023-03-30 ENCOUNTER — EMERGENCY (EMERGENCY)
Facility: HOSPITAL | Age: 58
LOS: 1 days | Discharge: HOME | End: 2023-03-30
Attending: EMERGENCY MEDICINE
Payer: MEDICAID

## 2023-03-30 VITALS
HEIGHT: 65 IN | TEMPERATURE: 98 F | WEIGHT: 237 LBS | HEART RATE: 87 BPM | SYSTOLIC BLOOD PRESSURE: 140 MMHG | RESPIRATION RATE: 20 BRPM | OXYGEN SATURATION: 100 % | DIASTOLIC BLOOD PRESSURE: 80 MMHG

## 2023-03-30 DIAGNOSIS — K42.9 UMBILICAL HERNIA WITHOUT OBSTRUCTION OR GANGRENE: ICD-10-CM

## 2023-03-30 DIAGNOSIS — R50.9 FEVER, UNSPECIFIED: ICD-10-CM

## 2023-03-30 DIAGNOSIS — E78.5 HYPERLIPIDEMIA, UNSPECIFIED: ICD-10-CM

## 2023-03-30 DIAGNOSIS — J45.909 UNSPECIFIED ASTHMA, UNCOMPLICATED: ICD-10-CM

## 2023-03-30 DIAGNOSIS — I10 ESSENTIAL (PRIMARY) HYPERTENSION: ICD-10-CM

## 2023-03-30 DIAGNOSIS — Z79.01 LONG TERM (CURRENT) USE OF ANTICOAGULANTS: ICD-10-CM

## 2023-03-30 DIAGNOSIS — Z79.84 LONG TERM (CURRENT) USE OF ORAL HYPOGLYCEMIC DRUGS: ICD-10-CM

## 2023-03-30 DIAGNOSIS — R10.9 UNSPECIFIED ABDOMINAL PAIN: ICD-10-CM

## 2023-03-30 DIAGNOSIS — E11.9 TYPE 2 DIABETES MELLITUS WITHOUT COMPLICATIONS: ICD-10-CM

## 2023-03-30 DIAGNOSIS — I25.10 ATHEROSCLEROTIC HEART DISEASE OF NATIVE CORONARY ARTERY WITHOUT ANGINA PECTORIS: ICD-10-CM

## 2023-03-30 LAB
ALBUMIN SERPL ELPH-MCNC: 3.5 G/DL — SIGNIFICANT CHANGE UP (ref 3.3–5)
ALP SERPL-CCNC: 84 U/L — SIGNIFICANT CHANGE UP (ref 40–120)
ALT FLD-CCNC: 84 U/L — HIGH (ref 12–78)
ANION GAP SERPL CALC-SCNC: 2 MMOL/L — LOW (ref 5–17)
APPEARANCE UR: CLEAR — SIGNIFICANT CHANGE UP
AST SERPL-CCNC: 34 U/L — SIGNIFICANT CHANGE UP (ref 15–37)
BASOPHILS # BLD AUTO: 0.02 K/UL — SIGNIFICANT CHANGE UP (ref 0–0.2)
BASOPHILS NFR BLD AUTO: 0.2 % — SIGNIFICANT CHANGE UP (ref 0–2)
BILIRUB SERPL-MCNC: 0.9 MG/DL — SIGNIFICANT CHANGE UP (ref 0.2–1.2)
BILIRUB UR-MCNC: NEGATIVE — SIGNIFICANT CHANGE UP
BUN SERPL-MCNC: 11 MG/DL — SIGNIFICANT CHANGE UP (ref 7–23)
CALCIUM SERPL-MCNC: 9 MG/DL — SIGNIFICANT CHANGE UP (ref 8.5–10.1)
CHLORIDE SERPL-SCNC: 109 MMOL/L — HIGH (ref 96–108)
CO2 SERPL-SCNC: 28 MMOL/L — SIGNIFICANT CHANGE UP (ref 22–31)
COLOR SPEC: YELLOW — SIGNIFICANT CHANGE UP
CREAT SERPL-MCNC: 0.67 MG/DL — SIGNIFICANT CHANGE UP (ref 0.5–1.3)
DIFF PNL FLD: NEGATIVE — SIGNIFICANT CHANGE UP
EGFR: 108 ML/MIN/1.73M2 — SIGNIFICANT CHANGE UP
EOSINOPHIL # BLD AUTO: 0.04 K/UL — SIGNIFICANT CHANGE UP (ref 0–0.5)
EOSINOPHIL NFR BLD AUTO: 0.4 % — SIGNIFICANT CHANGE UP (ref 0–6)
GLUCOSE SERPL-MCNC: 111 MG/DL — HIGH (ref 70–99)
GLUCOSE UR QL: NEGATIVE — SIGNIFICANT CHANGE UP
HCT VFR BLD CALC: 45.6 % — SIGNIFICANT CHANGE UP (ref 39–50)
HGB BLD-MCNC: 14.7 G/DL — SIGNIFICANT CHANGE UP (ref 13–17)
IMM GRANULOCYTES NFR BLD AUTO: 0.5 % — SIGNIFICANT CHANGE UP (ref 0–0.9)
KETONES UR-MCNC: NEGATIVE — SIGNIFICANT CHANGE UP
LEUKOCYTE ESTERASE UR-ACNC: NEGATIVE — SIGNIFICANT CHANGE UP
LIDOCAIN IGE QN: 64 U/L — LOW (ref 73–393)
LYMPHOCYTES # BLD AUTO: 1.23 K/UL — SIGNIFICANT CHANGE UP (ref 1–3.3)
LYMPHOCYTES # BLD AUTO: 10.9 % — LOW (ref 13–44)
MCHC RBC-ENTMCNC: 28.9 PG — SIGNIFICANT CHANGE UP (ref 27–34)
MCHC RBC-ENTMCNC: 32.2 GM/DL — SIGNIFICANT CHANGE UP (ref 32–36)
MCV RBC AUTO: 89.6 FL — SIGNIFICANT CHANGE UP (ref 80–100)
MONOCYTES # BLD AUTO: 0.8 K/UL — SIGNIFICANT CHANGE UP (ref 0–0.9)
MONOCYTES NFR BLD AUTO: 7.1 % — SIGNIFICANT CHANGE UP (ref 2–14)
NEUTROPHILS # BLD AUTO: 9.18 K/UL — HIGH (ref 1.8–7.4)
NEUTROPHILS NFR BLD AUTO: 80.9 % — HIGH (ref 43–77)
NITRITE UR-MCNC: NEGATIVE — SIGNIFICANT CHANGE UP
PH UR: 7 — SIGNIFICANT CHANGE UP (ref 5–8)
PLATELET # BLD AUTO: 171 K/UL — SIGNIFICANT CHANGE UP (ref 150–400)
POTASSIUM SERPL-MCNC: 3.8 MMOL/L — SIGNIFICANT CHANGE UP (ref 3.5–5.3)
POTASSIUM SERPL-SCNC: 3.8 MMOL/L — SIGNIFICANT CHANGE UP (ref 3.5–5.3)
PROT SERPL-MCNC: 6.2 GM/DL — SIGNIFICANT CHANGE UP (ref 6–8.3)
PROT UR-MCNC: NEGATIVE — SIGNIFICANT CHANGE UP
RBC # BLD: 5.09 M/UL — SIGNIFICANT CHANGE UP (ref 4.2–5.8)
RBC # FLD: 13.9 % — SIGNIFICANT CHANGE UP (ref 10.3–14.5)
SODIUM SERPL-SCNC: 139 MMOL/L — SIGNIFICANT CHANGE UP (ref 135–145)
SP GR SPEC: 1 — LOW (ref 1.01–1.02)
UROBILINOGEN FLD QL: NEGATIVE — SIGNIFICANT CHANGE UP
WBC # BLD: 11.33 K/UL — HIGH (ref 3.8–10.5)
WBC # FLD AUTO: 11.33 K/UL — HIGH (ref 3.8–10.5)

## 2023-03-30 PROCEDURE — 96361 HYDRATE IV INFUSION ADD-ON: CPT

## 2023-03-30 PROCEDURE — 85025 COMPLETE CBC W/AUTO DIFF WBC: CPT

## 2023-03-30 PROCEDURE — 93010 ELECTROCARDIOGRAM REPORT: CPT

## 2023-03-30 PROCEDURE — 81003 URINALYSIS AUTO W/O SCOPE: CPT

## 2023-03-30 PROCEDURE — 80053 COMPREHEN METABOLIC PANEL: CPT

## 2023-03-30 PROCEDURE — 99284 EMERGENCY DEPT VISIT MOD MDM: CPT | Mod: 25

## 2023-03-30 PROCEDURE — 93005 ELECTROCARDIOGRAM TRACING: CPT

## 2023-03-30 PROCEDURE — 99284 EMERGENCY DEPT VISIT MOD MDM: CPT

## 2023-03-30 PROCEDURE — 96375 TX/PRO/DX INJ NEW DRUG ADDON: CPT

## 2023-03-30 PROCEDURE — 36415 COLL VENOUS BLD VENIPUNCTURE: CPT

## 2023-03-30 PROCEDURE — 87086 URINE CULTURE/COLONY COUNT: CPT

## 2023-03-30 PROCEDURE — 96374 THER/PROPH/DIAG INJ IV PUSH: CPT

## 2023-03-30 PROCEDURE — 83690 ASSAY OF LIPASE: CPT

## 2023-03-31 LAB
CULTURE RESULTS: SIGNIFICANT CHANGE UP
SPECIMEN SOURCE: SIGNIFICANT CHANGE UP

## 2023-04-13 ENCOUNTER — APPOINTMENT (OUTPATIENT)
Dept: SURGERY | Facility: CLINIC | Age: 58
End: 2023-04-13
Payer: MEDICAID

## 2023-04-13 ENCOUNTER — LABORATORY RESULT (OUTPATIENT)
Age: 58
End: 2023-04-13

## 2023-04-13 PROCEDURE — 99212 OFFICE O/P EST SF 10 MIN: CPT

## 2023-04-16 NOTE — PLAN
[FreeTextEntry1] : cleared by cardiology\par on eliquis he will stop it 2 days prior to surgery\par he needs medical clearance\par will repeat CBC as WBC was 12 K on CBC done on 03/25\par for lap possible open bilateral inguinal hernia repair and umbilical hernia repair w mesh\par went over possible risks, benefits of surgery and alternatives\par post op recovery scheduled tentatively end of this month pending risk assessment by his primary

## 2023-04-16 NOTE — HISTORY OF PRESENT ILLNESS
[de-identified] : toya to me\par b/l inguinal hernia and umbilical hernia\par seen his cardiologist, clerared by cardiology needs to stop his eliquis two days prior to surgery\par didn’t see his primary yet\par he needs to see him\par no new complaints today\par went through surgery again\par

## 2023-04-24 NOTE — ASU PATIENT PROFILE, ADULT - NSICDXPASTMEDICALHX_GEN_ALL_CORE_FT
PAST MEDICAL HISTORY:  A-fib     Asthma     Diabetes     High cholesterol     HTN (hypertension)

## 2023-04-24 NOTE — ASU PATIENT PROFILE, ADULT - FALL HARM RISK - UNIVERSAL INTERVENTIONS
Bed in lowest position, wheels locked, appropriate side rails in place/Call bell, personal items and telephone in reach/Instruct patient to call for assistance before getting out of bed or chair/Non-slip footwear when patient is out of bed/[place] to call system/Physically safe environment - no spills, clutter or unnecessary equipment/Purposeful Proactive Rounding/Room/bathroom lighting operational, light cord in reach

## 2023-04-25 ENCOUNTER — APPOINTMENT (OUTPATIENT)
Dept: SURGERY | Facility: HOSPITAL | Age: 58
End: 2023-04-25

## 2023-04-25 ENCOUNTER — TRANSCRIPTION ENCOUNTER (OUTPATIENT)
Age: 58
End: 2023-04-25

## 2023-04-25 ENCOUNTER — RESULT REVIEW (OUTPATIENT)
Age: 58
End: 2023-04-25

## 2023-04-25 ENCOUNTER — OUTPATIENT (OUTPATIENT)
Dept: INPATIENT UNIT | Facility: HOSPITAL | Age: 58
LOS: 1 days | Discharge: HOME | End: 2023-04-25
Payer: MEDICAID

## 2023-04-25 VITALS
OXYGEN SATURATION: 99 % | DIASTOLIC BLOOD PRESSURE: 101 MMHG | TEMPERATURE: 98 F | WEIGHT: 233.03 LBS | SYSTOLIC BLOOD PRESSURE: 140 MMHG | HEIGHT: 65 IN | HEART RATE: 80 BPM | RESPIRATION RATE: 16 BRPM

## 2023-04-25 VITALS
SYSTOLIC BLOOD PRESSURE: 138 MMHG | TEMPERATURE: 98 F | OXYGEN SATURATION: 96 % | RESPIRATION RATE: 18 BRPM | DIASTOLIC BLOOD PRESSURE: 82 MMHG | HEART RATE: 83 BPM

## 2023-04-25 DIAGNOSIS — E66.9 OBESITY, UNSPECIFIED: ICD-10-CM

## 2023-04-25 DIAGNOSIS — K40.20 BILATERAL INGUINAL HERNIA, WITHOUT OBSTRUCTION OR GANGRENE, NOT SPECIFIED AS RECURRENT: ICD-10-CM

## 2023-04-25 DIAGNOSIS — I48.91 UNSPECIFIED ATRIAL FIBRILLATION: ICD-10-CM

## 2023-04-25 DIAGNOSIS — Z98.890 OTHER SPECIFIED POSTPROCEDURAL STATES: Chronic | ICD-10-CM

## 2023-04-25 DIAGNOSIS — K42.9 UMBILICAL HERNIA WITHOUT OBSTRUCTION OR GANGRENE: ICD-10-CM

## 2023-04-25 DIAGNOSIS — M19.90 UNSPECIFIED OSTEOARTHRITIS, UNSPECIFIED SITE: ICD-10-CM

## 2023-04-25 DIAGNOSIS — Z79.01 LONG TERM (CURRENT) USE OF ANTICOAGULANTS: ICD-10-CM

## 2023-04-25 DIAGNOSIS — E11.9 TYPE 2 DIABETES MELLITUS WITHOUT COMPLICATIONS: ICD-10-CM

## 2023-04-25 DIAGNOSIS — E78.00 PURE HYPERCHOLESTEROLEMIA, UNSPECIFIED: ICD-10-CM

## 2023-04-25 DIAGNOSIS — Z86.19 PERSONAL HISTORY OF OTHER INFECTIOUS AND PARASITIC DISEASES: ICD-10-CM

## 2023-04-25 DIAGNOSIS — I10 ESSENTIAL (PRIMARY) HYPERTENSION: ICD-10-CM

## 2023-04-25 DIAGNOSIS — Z79.84 LONG TERM (CURRENT) USE OF ORAL HYPOGLYCEMIC DRUGS: ICD-10-CM

## 2023-04-25 DIAGNOSIS — J45.909 UNSPECIFIED ASTHMA, UNCOMPLICATED: ICD-10-CM

## 2023-04-25 LAB
BLD GP AB SCN SERPL QL: SIGNIFICANT CHANGE UP
HCT VFR BLD CALC: 47.7 % — SIGNIFICANT CHANGE UP (ref 39–50)
HGB BLD-MCNC: 15.9 G/DL — SIGNIFICANT CHANGE UP (ref 13–17)
MCHC RBC-ENTMCNC: 28.7 PG — SIGNIFICANT CHANGE UP (ref 27–34)
MCHC RBC-ENTMCNC: 33.3 GM/DL — SIGNIFICANT CHANGE UP (ref 32–36)
MCV RBC AUTO: 86.1 FL — SIGNIFICANT CHANGE UP (ref 80–100)
PLATELET # BLD AUTO: 239 K/UL — SIGNIFICANT CHANGE UP (ref 150–400)
RBC # BLD: 5.54 M/UL — SIGNIFICANT CHANGE UP (ref 4.2–5.8)
RBC # FLD: 13.6 % — SIGNIFICANT CHANGE UP (ref 10.3–14.5)
WBC # BLD: 12.87 K/UL — HIGH (ref 3.8–10.5)
WBC # FLD AUTO: 12.87 K/UL — HIGH (ref 3.8–10.5)

## 2023-04-25 PROCEDURE — C9399: CPT

## 2023-04-25 PROCEDURE — 85027 COMPLETE CBC AUTOMATED: CPT

## 2023-04-25 PROCEDURE — 49650 LAP ING HERNIA REPAIR INIT: CPT

## 2023-04-25 PROCEDURE — 88302 TISSUE EXAM BY PATHOLOGIST: CPT

## 2023-04-25 PROCEDURE — 86901 BLOOD TYPING SEROLOGIC RH(D): CPT

## 2023-04-25 PROCEDURE — C1889: CPT

## 2023-04-25 PROCEDURE — 86900 BLOOD TYPING SEROLOGIC ABO: CPT

## 2023-04-25 PROCEDURE — 88302 TISSUE EXAM BY PATHOLOGIST: CPT | Mod: 26

## 2023-04-25 PROCEDURE — 86850 RBC ANTIBODY SCREEN: CPT

## 2023-04-25 PROCEDURE — C1781: CPT

## 2023-04-25 PROCEDURE — 36415 COLL VENOUS BLD VENIPUNCTURE: CPT

## 2023-04-25 PROCEDURE — 49591 RPR AA HRN 1ST < 3 CM RDC: CPT

## 2023-04-25 RX ORDER — OXYCODONE HYDROCHLORIDE 5 MG/1
1 TABLET ORAL
Qty: 28 | Refills: 0
Start: 2023-04-25 | End: 2023-05-01

## 2023-04-25 RX ORDER — FENTANYL CITRATE 50 UG/ML
50 INJECTION INTRAVENOUS
Refills: 0 | Status: DISCONTINUED | OUTPATIENT
Start: 2023-04-25 | End: 2023-04-25

## 2023-04-25 RX ORDER — OXYCODONE HYDROCHLORIDE 5 MG/1
10 TABLET ORAL ONCE
Refills: 0 | Status: DISCONTINUED | OUTPATIENT
Start: 2023-04-25 | End: 2023-04-25

## 2023-04-25 RX ORDER — ONDANSETRON 8 MG/1
4 TABLET, FILM COATED ORAL ONCE
Refills: 0 | Status: DISCONTINUED | OUTPATIENT
Start: 2023-04-25 | End: 2023-04-25

## 2023-04-25 RX ORDER — SODIUM CHLORIDE 9 MG/ML
1000 INJECTION, SOLUTION INTRAVENOUS
Refills: 0 | Status: DISCONTINUED | OUTPATIENT
Start: 2023-04-25 | End: 2023-04-25

## 2023-04-25 RX ORDER — OXYCODONE HYDROCHLORIDE 5 MG/1
5 TABLET ORAL ONCE
Refills: 0 | Status: DISCONTINUED | OUTPATIENT
Start: 2023-04-25 | End: 2023-04-25

## 2023-04-25 RX ADMIN — OXYCODONE HYDROCHLORIDE 5 MILLIGRAM(S): 5 TABLET ORAL at 18:50

## 2023-04-25 RX ADMIN — FENTANYL CITRATE 50 MICROGRAM(S): 50 INJECTION INTRAVENOUS at 16:52

## 2023-04-25 RX ADMIN — FENTANYL CITRATE 50 MICROGRAM(S): 50 INJECTION INTRAVENOUS at 16:22

## 2023-04-25 NOTE — ASU DISCHARGE PLAN (ADULT/PEDIATRIC) - ASU DC SPECIAL INSTRUCTIONSFT
Take abx for 7 days, remove dressings in 48 hours. NO heavy lifting for 6 weeks. Resume Eliquis Thursday. Take abx for 7 days, remove dressings in 48 hours. NO heavy lifting for 6 weeks. Resume Eliquis Thursday. 04/27/2023

## 2023-04-25 NOTE — ASU DISCHARGE PLAN (ADULT/PEDIATRIC) - PROCEDURE
B/L Lap TEP Inguinal Hernia B/L Lap TEP Inguinal Hernia repair w mesh and open umbilical hernia repair

## 2023-04-25 NOTE — ASU DISCHARGE PLAN (ADULT/PEDIATRIC) - CARE PROVIDER_API CALL
Lili Garcia)  Surgery; Surgical Critical Care  [address] [place] [address]  Phone: [phone]  Fax: [phone]  Follow Up Time: 2 weeks

## 2023-04-25 NOTE — BRIEF OPERATIVE NOTE - NSICDXBRIEFPROCEDURE_GEN_ALL_CORE_FT
PROCEDURES:  Laparoscopic repair of inguinal hernia with mesh 25-Apr-2023 12:31:50  Gillian Bradley   PROCEDURES:  Laparoscopic repair of inguinal hernia with mesh 25-Apr-2023 12:31:50  Gillian Bradley  Repair, hernia, umbilical, with lipectomy 25-Apr-2023 15:37:43  Gillian Bradley

## 2023-04-25 NOTE — ASU DISCHARGE PLAN (ADULT/PEDIATRIC) - PATIENT EDUCATION MATERIALS PROVIED
Provider pre-printed instructions given [place] Counseling: I discussed with the patient the risks of risankizumab-rzaa including but not limited to immunosuppression, and serious infections.  The patient understands that monitoring is required including a PPD at baseline and must alert us or the primary physician if symptoms of infection or other concerning signs are noted.

## 2023-04-25 NOTE — BRIEF OPERATIVE NOTE - OPERATION/FINDINGS
lap TEP, b/l IHR w/ mesh, dermabond on incision sites lap TEP, b/l IHR w/ mesh, umbilical hernia repair, no mesh, absites and steri on incision sites lap TEP, b/l IHR w/ mesh,   open umbilical hernia repair, no mesh, opsite and steri on incision sites

## 2023-04-26 PROBLEM — E11.9 TYPE 2 DIABETES MELLITUS WITHOUT COMPLICATIONS: Chronic | Status: ACTIVE | Noted: 2023-04-24

## 2023-04-27 ENCOUNTER — APPOINTMENT (OUTPATIENT)
Dept: UROLOGY | Facility: CLINIC | Age: 58
End: 2023-04-27

## 2023-04-27 LAB — SURGICAL PATHOLOGY STUDY: SIGNIFICANT CHANGE UP

## 2023-05-11 ENCOUNTER — APPOINTMENT (OUTPATIENT)
Dept: SURGERY | Facility: CLINIC | Age: 58
End: 2023-05-11
Payer: MEDICAID

## 2023-05-11 VITALS
DIASTOLIC BLOOD PRESSURE: 78 MMHG | TEMPERATURE: 97.2 F | OXYGEN SATURATION: 98 % | HEART RATE: 89 BPM | SYSTOLIC BLOOD PRESSURE: 138 MMHG | WEIGHT: 230 LBS | HEIGHT: 65 IN | BODY MASS INDEX: 38.32 KG/M2

## 2023-05-11 PROCEDURE — 99024 POSTOP FOLLOW-UP VISIT: CPT

## 2023-05-11 NOTE — HISTORY OF PRESENT ILLNESS
[de-identified] : doing well\par mild lower abd pain\par no N/V\par no constipation or diarrhea\par ambulating\par not on pain meds

## 2023-05-11 NOTE — PHYSICAL EXAM
[JVD] : no jugular venous distention  [Carotid Bruits] : no carotid bruits [Normal Breath Sounds] : Normal breath sounds [Normal Heart Sounds] : normal heart sounds [Abdominal Masses] : No abdominal masses [Abdomen Tenderness] : ~T ~M No abdominal tenderness [Tender] : nontender [Enlarged] : not enlarged [de-identified] : well [de-identified] : incisions are c/d/ i

## 2023-05-11 NOTE — REASON FOR VISIT
[Follow-Up] : a follow-up visit [Family Member] : family member [FreeTextEntry1] : s/p lap b/l inguinal hernia repair w mesh and open umbilical hernia repair no mesh

## 2023-06-07 ENCOUNTER — APPOINTMENT (OUTPATIENT)
Dept: SURGERY | Facility: CLINIC | Age: 58
End: 2023-06-07

## 2023-06-15 ENCOUNTER — APPOINTMENT (OUTPATIENT)
Dept: SURGERY | Facility: CLINIC | Age: 58
End: 2023-06-15
Payer: MEDICAID

## 2023-06-15 VITALS
BODY MASS INDEX: 37.65 KG/M2 | OXYGEN SATURATION: 98 % | SYSTOLIC BLOOD PRESSURE: 156 MMHG | HEIGHT: 65 IN | DIASTOLIC BLOOD PRESSURE: 84 MMHG | HEART RATE: 69 BPM | WEIGHT: 226 LBS

## 2023-06-15 PROCEDURE — 99024 POSTOP FOLLOW-UP VISIT: CPT

## 2023-06-15 NOTE — HISTORY OF PRESENT ILLNESS
[de-identified] : s/p lap b/l inguinal hernia repair w mesh and open umbilical hernia repair\par complains of throbbing pain at one of the incision site\par no pressure like pain\par no swelling, tolerating regular diet

## 2023-06-15 NOTE — PHYSICAL EXAM
[Abdominal Masses] : No abdominal masses [Abdomen Tenderness] : ~T ~M No abdominal tenderness [Tender] : nontender [Enlarged] : not enlarged [de-identified] : well appearing [de-identified] : soft and nontender\par incisions healed, no signs of infection\par no hernias

## 2023-06-15 NOTE — REASON FOR VISIT
[Follow-Up: _____] : a [unfilled] follow-up visit [Family Member] : family member [FreeTextEntry1] : s/p lap b/l inguinal hernia repair w mesh and open umbilical hernia

## 2023-06-21 ENCOUNTER — EMERGENCY (EMERGENCY)
Facility: HOSPITAL | Age: 58
LOS: 0 days | Discharge: HOME | End: 2023-06-21
Attending: EMERGENCY MEDICINE
Payer: MEDICAID

## 2023-06-21 VITALS — SYSTOLIC BLOOD PRESSURE: 114 MMHG | HEART RATE: 98 BPM | DIASTOLIC BLOOD PRESSURE: 104 MMHG

## 2023-06-21 VITALS — HEIGHT: 65 IN | WEIGHT: 227.08 LBS

## 2023-06-21 DIAGNOSIS — R06.09 OTHER FORMS OF DYSPNEA: ICD-10-CM

## 2023-06-21 DIAGNOSIS — I48.91 UNSPECIFIED ATRIAL FIBRILLATION: ICD-10-CM

## 2023-06-21 DIAGNOSIS — Z98.890 OTHER SPECIFIED POSTPROCEDURAL STATES: Chronic | ICD-10-CM

## 2023-06-21 DIAGNOSIS — R07.89 OTHER CHEST PAIN: ICD-10-CM

## 2023-06-21 DIAGNOSIS — Z86.16 PERSONAL HISTORY OF COVID-19: ICD-10-CM

## 2023-06-21 DIAGNOSIS — I10 ESSENTIAL (PRIMARY) HYPERTENSION: ICD-10-CM

## 2023-06-21 DIAGNOSIS — Z79.84 LONG TERM (CURRENT) USE OF ORAL HYPOGLYCEMIC DRUGS: ICD-10-CM

## 2023-06-21 DIAGNOSIS — Z87.01 PERSONAL HISTORY OF PNEUMONIA (RECURRENT): ICD-10-CM

## 2023-06-21 DIAGNOSIS — Z79.01 LONG TERM (CURRENT) USE OF ANTICOAGULANTS: ICD-10-CM

## 2023-06-21 DIAGNOSIS — E78.5 HYPERLIPIDEMIA, UNSPECIFIED: ICD-10-CM

## 2023-06-21 DIAGNOSIS — J45.909 UNSPECIFIED ASTHMA, UNCOMPLICATED: ICD-10-CM

## 2023-06-21 LAB
ALBUMIN SERPL ELPH-MCNC: 3.6 G/DL — SIGNIFICANT CHANGE UP (ref 3.3–5)
ALP SERPL-CCNC: 83 U/L — SIGNIFICANT CHANGE UP (ref 40–120)
ALT FLD-CCNC: 23 U/L — SIGNIFICANT CHANGE UP (ref 12–78)
ANION GAP SERPL CALC-SCNC: 4 MMOL/L — LOW (ref 5–17)
APTT BLD: 38.5 SEC — HIGH (ref 27.5–35.5)
AST SERPL-CCNC: 11 U/L — LOW (ref 15–37)
BASOPHILS # BLD AUTO: 0.03 K/UL — SIGNIFICANT CHANGE UP (ref 0–0.2)
BASOPHILS NFR BLD AUTO: 0.3 % — SIGNIFICANT CHANGE UP (ref 0–2)
BILIRUB SERPL-MCNC: 1.5 MG/DL — HIGH (ref 0.2–1.2)
BUN SERPL-MCNC: 8 MG/DL — SIGNIFICANT CHANGE UP (ref 7–23)
CALCIUM SERPL-MCNC: 9 MG/DL — SIGNIFICANT CHANGE UP (ref 8.5–10.1)
CHLORIDE SERPL-SCNC: 108 MMOL/L — SIGNIFICANT CHANGE UP (ref 96–108)
CO2 SERPL-SCNC: 28 MMOL/L — SIGNIFICANT CHANGE UP (ref 22–31)
CREAT SERPL-MCNC: 0.89 MG/DL — SIGNIFICANT CHANGE UP (ref 0.5–1.3)
D DIMER BLD IA.RAPID-MCNC: 221 NG/ML DDU — SIGNIFICANT CHANGE UP
EGFR: 99 ML/MIN/1.73M2 — SIGNIFICANT CHANGE UP
EOSINOPHIL # BLD AUTO: 0.1 K/UL — SIGNIFICANT CHANGE UP (ref 0–0.5)
EOSINOPHIL NFR BLD AUTO: 1.1 % — SIGNIFICANT CHANGE UP (ref 0–6)
GLUCOSE SERPL-MCNC: 138 MG/DL — HIGH (ref 70–99)
HCT VFR BLD CALC: 43.9 % — SIGNIFICANT CHANGE UP (ref 39–50)
HGB BLD-MCNC: 15 G/DL — SIGNIFICANT CHANGE UP (ref 13–17)
IMM GRANULOCYTES NFR BLD AUTO: 0.9 % — SIGNIFICANT CHANGE UP (ref 0–0.9)
INR BLD: 1.22 RATIO — HIGH (ref 0.88–1.16)
LYMPHOCYTES # BLD AUTO: 1.56 K/UL — SIGNIFICANT CHANGE UP (ref 1–3.3)
LYMPHOCYTES # BLD AUTO: 17.7 % — SIGNIFICANT CHANGE UP (ref 13–44)
MCHC RBC-ENTMCNC: 28.6 PG — SIGNIFICANT CHANGE UP (ref 27–34)
MCHC RBC-ENTMCNC: 34.2 GM/DL — SIGNIFICANT CHANGE UP (ref 32–36)
MCV RBC AUTO: 83.8 FL — SIGNIFICANT CHANGE UP (ref 80–100)
MONOCYTES # BLD AUTO: 0.79 K/UL — SIGNIFICANT CHANGE UP (ref 0–0.9)
MONOCYTES NFR BLD AUTO: 9 % — SIGNIFICANT CHANGE UP (ref 2–14)
NEUTROPHILS # BLD AUTO: 6.25 K/UL — SIGNIFICANT CHANGE UP (ref 1.8–7.4)
NEUTROPHILS NFR BLD AUTO: 71 % — SIGNIFICANT CHANGE UP (ref 43–77)
NT-PROBNP SERPL-SCNC: 1105 PG/ML — HIGH (ref 0–125)
PLATELET # BLD AUTO: 212 K/UL — SIGNIFICANT CHANGE UP (ref 150–400)
POTASSIUM SERPL-MCNC: 3.4 MMOL/L — LOW (ref 3.5–5.3)
POTASSIUM SERPL-SCNC: 3.4 MMOL/L — LOW (ref 3.5–5.3)
PROT SERPL-MCNC: 7 GM/DL — SIGNIFICANT CHANGE UP (ref 6–8.3)
PROTHROM AB SERPL-ACNC: 14.2 SEC — HIGH (ref 10.5–13.4)
RBC # BLD: 5.24 M/UL — SIGNIFICANT CHANGE UP (ref 4.2–5.8)
RBC # FLD: 14.7 % — HIGH (ref 10.3–14.5)
SODIUM SERPL-SCNC: 140 MMOL/L — SIGNIFICANT CHANGE UP (ref 135–145)
TROPONIN I, HIGH SENSITIVITY RESULT: 9.27 NG/L — SIGNIFICANT CHANGE UP
WBC # BLD: 8.81 K/UL — SIGNIFICANT CHANGE UP (ref 3.8–10.5)
WBC # FLD AUTO: 8.81 K/UL — SIGNIFICANT CHANGE UP (ref 3.8–10.5)

## 2023-06-21 PROCEDURE — 80053 COMPREHEN METABOLIC PANEL: CPT

## 2023-06-21 PROCEDURE — 93005 ELECTROCARDIOGRAM TRACING: CPT

## 2023-06-21 PROCEDURE — 85610 PROTHROMBIN TIME: CPT

## 2023-06-21 PROCEDURE — 71045 X-RAY EXAM CHEST 1 VIEW: CPT

## 2023-06-21 PROCEDURE — 85379 FIBRIN DEGRADATION QUANT: CPT

## 2023-06-21 PROCEDURE — 36415 COLL VENOUS BLD VENIPUNCTURE: CPT

## 2023-06-21 PROCEDURE — 99285 EMERGENCY DEPT VISIT HI MDM: CPT | Mod: 25

## 2023-06-21 PROCEDURE — 83880 ASSAY OF NATRIURETIC PEPTIDE: CPT

## 2023-06-21 PROCEDURE — 84484 ASSAY OF TROPONIN QUANT: CPT

## 2023-06-21 PROCEDURE — 99285 EMERGENCY DEPT VISIT HI MDM: CPT

## 2023-06-21 PROCEDURE — 85025 COMPLETE CBC W/AUTO DIFF WBC: CPT

## 2023-06-21 PROCEDURE — 93010 ELECTROCARDIOGRAM REPORT: CPT

## 2023-06-21 PROCEDURE — 71045 X-RAY EXAM CHEST 1 VIEW: CPT | Mod: 26

## 2023-06-21 PROCEDURE — 85730 THROMBOPLASTIN TIME PARTIAL: CPT

## 2023-06-21 RX ORDER — FUROSEMIDE 40 MG
20 TABLET ORAL ONCE
Refills: 0 | Status: COMPLETED | OUTPATIENT
Start: 2023-06-21 | End: 2023-06-21

## 2023-06-21 RX ORDER — POTASSIUM CHLORIDE 20 MEQ
40 PACKET (EA) ORAL ONCE
Refills: 0 | Status: COMPLETED | OUTPATIENT
Start: 2023-06-21 | End: 2023-06-21

## 2023-06-21 RX ADMIN — Medication 40 MILLIEQUIVALENT(S): at 17:29

## 2023-06-21 RX ADMIN — Medication 20 MILLIGRAM(S): at 17:29

## 2023-06-21 NOTE — ED ADULT TRIAGE NOTE - IDEAL BODY WEIGHT(KG)
The [address] [place] [address],[address], [address], [address] [place] [address]  Phone: [phone]   Fax:     [phone]      Physical Therapy Daily Treatment Note  Date:  6/3/2021    Patient Name:  Rafaela Rosas    :  1980  MRN: [id number]  English       []other:    Pain level:  1/10 6/3/21    SUBJECTIVE:  6/3 62

## 2023-06-21 NOTE — ED STATDOCS - PATIENT PORTAL LINK FT
You can access the FollowMyHealth Patient Portal offered by [facility] by registering at the following website: [email or web address]. By joining [employer]’s FollowMyHealth portal, you will also be able to view your health information using other applications (apps) compatible with our system.

## 2023-06-21 NOTE — ED ADULT TRIAGE NOTE - CHIEF COMPLAINT QUOTE
pt complaining of chest pain and excessive coughing since yesterday.  pt has possible hx of cardiac problems from having covid.

## 2023-06-21 NOTE — ED ADULT NURSE NOTE - NSFALLUNIVINTERV_ED_ALL_ED
Bed/Stretcher in lowest position, wheels locked, appropriate side rails in place/Call bell, personal items and telephone in reach/Instruct patient to call for assistance before getting out of bed/chair/stretcher/Non-slip footwear applied when patient is off stretcher/[place] to call system/Physically safe environment - no spills, clutter or unnecessary equipment/Purposeful proactive rounding/Room/bathroom lighting operational, light cord in reach

## 2023-06-21 NOTE — ED STATDOCS - OBJECTIVE STATEMENT
58 y/o M with a PMhx of afib, intermittent pedal edema, asthma, hernia repairs x 3, HLD, PNA in September 2022, and HTN presents to the ED c/o CP. pt states he was admitted to  in 2021 for 13 days without intubation but required admission due to heart valves not closing properly. States chest pain began 2 the mid-sternum radiating to the L chest and worsening URENA. States SOB feels similar to how he felt after discharge from hospital 2 years ago, but CP feels different. States b/l LE feel cold. Has intermittent pedal edema tx with lasix by Dr. Lainez in the [facility] for a short period. Cardio: Dr. Samuel.

## 2023-06-21 NOTE — ED STATDOCS - CARE PROVIDER_API CALL
Joceline Samuel  Cardiovascular Disease  [address] [address]  [place] [address]  Phone: [phone]  Fax: [phone]  Follow Up Time:

## 2023-06-21 NOTE — ED STATDOCS - NS ED MD DISPO DISCHARGE
9222  Assumed care of pt at this time. Assessment complete. Pt alert and oriented x 4. Denies SOB and chest pain. Pt lungs clear bilaterally. Cap refill  less than 3 seconds. Pt denies numbness and tingling to all extremities. Stated pain 8/10. Pt has 20 G IV to R hand. Pt has ACE bandage dressing to right knee CDI . Plexis bilaterally and TEDs in place to LLE. Pt encouraged to continue use of IS. Pt verbalized understanding. Ice pack applied. Call light and possessions within reach. Bed in low position. Will continue to monitor.  Fall risk arm band in place Home

## 2023-06-21 NOTE — ED STATDOCS - NSFOLLOWUPINSTRUCTIONS_ED_ALL_ED_FT
Disnea en los adultos  Shortness of Breath, Adult  Opal persona tiene disnea cuando tiene dificultad para respirar o cuando siente que tiene problemas para inhalar suficiente aire. La disnea puede ser un signo de un problema médico.    Siga estas indicaciones en tai casa:  A sign showing that a person should not smoke.  Sustancias contaminantes    No consuma ningún producto que contenga nicotina o tabaco. Estos productos incluyen cigarrillos, tabaco para mascar y aparatos de vapeo, tressa los cigarrillos electrónicos. [place] también incluye cigarros y pipas. Si necesita ayuda para dejar de consumir estos productos, consulte al médico.  Evite cosas que pueden irritar las vías respiratorias, entre ellas:  Humo. [place] incluye el humo de las fogatas, el humo de los incendios forestales y el humo ambiental de los productos que contienen tabaco. No fume ni permita que otras personas fumen en tai casa.  Moho.  Polvo.  Contaminación del aire.  Vapores de productos químicos.  Cosas que le pueden producir opal reacción alérgica (alérgenos) si tiene alergias. Los alérgenos frecuentes incluyen el polen de pasto o árboles y la caspa de los animales.  Mantenga tai casa limpia y sin moho ni polvo.  Indicaciones generales    Esté atento a cualquier cambio en los síntomas.  Use los medicamentos de venta chayito y los recetados solamente tressa se lo haya indicado el médico. [place] incluye la oxigenoterapia y los medicamentos inhalados.  Descanse todo lo que sea necesario.  Retome bernardo actividades normales según lo indicado por el médico. Pregúntele al médico qué actividades son seguras para usted.  Concurra a todas las visitas de seguimiento. [place] es importante.  Comuníquese con un médico si:  Tai afección no mejora tan pronto tressa se espera.  Le ashlee hacer las actividades cotidianas, incluso después de descansar.  Aparecen nuevos síntomas.  No puede subir escaleras o realizar ejercicio del modo en que lo hacía habitualmente.  Solicite ayuda de inmediato si:  La disnea empeora.  Tiene dificultad para respirar cuando está en reposo.  Se siente mareado o se desmaya.  Tiene tos que no puede controlar con la medicación.  Tose y escupe bar.  Siente dolor al respirar.  Tiene dolor en el pecho, los brazos, los hombros o el abdomen.  Tiene fiebre.  Estos síntomas pueden indicar opal emergencia. Solicite ayuda de inmediato. Llame al 911.  No espere a justus si los síntomas desaparecen.  No conduzca por bernardo propios medios hasta el hospital.  Resumen  Opal persona tiene disnea cuando tiene dificultad para inhalar la cantidad suficiente de aire. Puede ser signo de un problema médico.  Evite las cosas que irritan los pulmones, tressa el hábito de fumar, la contaminación, el moho y el polvo.  Preste atención a los cambios en los síntomas y comuníquese con el médico si le resulta difícil realizar bernardo actividades cotidianas debido a la disnea.  Esta información no tiene tressa fin reemplazar el consejo del médico. Asegúrese de hacerle al médico cualquier pregunta que tenga.

## 2023-06-21 NOTE — ED STATDOCS - ATTENDING APP SHARED VISIT CONTRIBUTION OF CARE
I,Michael Cobos MD,  performed the initial face to face bedside interview with this patient regarding history of present illness, review of symptoms and relevant past medical, social and family history.  I completed an independent physical examination.  I was the initial provider who evaluated this patient. I have signed out the follow up of any pending tests (i.e. labs, radiological studies) to the ACP.  I have communicated the patient’s plan of care and disposition with the ACP.  The history, relevant review of systems, past medical and surgical history, medical decision making, and physical examination was documented by the scribe in my presence and I attest to the accuracy of the documentation.

## 2023-06-21 NOTE — ED STATDOCS - CLINICAL SUMMARY MEDICAL DECISION MAKING FREE TEXT BOX
pt with known afib with 2 day of sharp CP and URENA. not hypoxic or tachycardic. will check lab work including bnp, trop, cxr, and re-eval.

## 2023-06-21 NOTE — ED STATDOCS - MUSCULOSKELETAL, MLM
range of motion is not limited and there is no muscle tenderness. No calf swelling or t tp, 2+ pulses DP

## 2023-07-13 ENCOUNTER — APPOINTMENT (OUTPATIENT)
Dept: UROLOGY | Facility: CLINIC | Age: 58
End: 2023-07-13
Payer: MEDICAID

## 2023-07-13 ENCOUNTER — APPOINTMENT (OUTPATIENT)
Dept: SURGERY | Facility: CLINIC | Age: 58
End: 2023-07-13
Payer: MEDICAID

## 2023-07-13 VITALS
SYSTOLIC BLOOD PRESSURE: 142 MMHG | TEMPERATURE: 98.6 F | HEIGHT: 65 IN | WEIGHT: 239 LBS | BODY MASS INDEX: 39.82 KG/M2 | OXYGEN SATURATION: 98 % | DIASTOLIC BLOOD PRESSURE: 82 MMHG | HEART RATE: 72 BPM

## 2023-07-13 DIAGNOSIS — N50.3 CYST OF EPIDIDYMIS: ICD-10-CM

## 2023-07-13 PROCEDURE — 52000 CYSTOURETHROSCOPY: CPT

## 2023-07-13 PROCEDURE — 99024 POSTOP FOLLOW-UP VISIT: CPT

## 2023-07-17 NOTE — REASON FOR VISIT
[Follow-Up: _____] : a [unfilled] follow-up visit [FreeTextEntry1] : 3 months follow up after lap b/l inguinal hernia repair w mesh and open umbilical hernia primary repair

## 2023-07-17 NOTE — HISTORY OF PRESENT ILLNESS
[de-identified] : doing well\par no pain\par asked for letter for work\par instructed to lose weight\par and limit heavy lifting to prevent hernia recurrence

## 2023-07-17 NOTE — PHYSICAL EXAM
[de-identified] : soft non tender\par no umbilical or inguinal hernias recurrence\par incisions healed

## 2023-08-14 ENCOUNTER — NON-APPOINTMENT (OUTPATIENT)
Age: 58
End: 2023-08-14

## 2023-08-24 RX ORDER — DIAZEPAM 5 MG/1
5 TABLET ORAL
Qty: 2 | Refills: 0 | Status: ACTIVE | COMMUNITY
Start: 2023-08-24 | End: 1900-01-01

## 2023-09-05 ENCOUNTER — APPOINTMENT (OUTPATIENT)
Dept: UROLOGY | Facility: CLINIC | Age: 58
End: 2023-09-05
Payer: MEDICAID

## 2023-09-05 VITALS
DIASTOLIC BLOOD PRESSURE: 72 MMHG | SYSTOLIC BLOOD PRESSURE: 127 MMHG | HEIGHT: 65 IN | WEIGHT: 239 LBS | BODY MASS INDEX: 39.82 KG/M2

## 2023-09-05 DIAGNOSIS — N40.1 BENIGN PROSTATIC HYPERPLASIA WITH LOWER URINARY TRACT SYMPMS: ICD-10-CM

## 2023-09-05 DIAGNOSIS — N13.8 BENIGN PROSTATIC HYPERPLASIA WITH LOWER URINARY TRACT SYMPMS: ICD-10-CM

## 2023-09-05 PROCEDURE — 52442Z: CUSTOM

## 2023-09-05 PROCEDURE — 52441Z: CUSTOM

## 2023-09-05 RX ORDER — PHENAZOPYRIDINE HYDROCHLORIDE 200 MG/1
200 TABLET ORAL 3 TIMES DAILY
Qty: 21 | Refills: 0 | Status: ACTIVE | COMMUNITY
Start: 2023-09-05 | End: 1900-01-01

## 2023-09-05 RX ORDER — OXYBUTYNIN CHLORIDE 5 MG/1
5 TABLET ORAL
Qty: 30 | Refills: 3 | Status: ACTIVE | COMMUNITY
Start: 2023-09-05 | End: 1900-01-01

## 2023-09-05 RX ORDER — TRAMADOL HYDROCHLORIDE 50 MG/1
50 TABLET, COATED ORAL
Qty: 12 | Refills: 0 | Status: ACTIVE | COMMUNITY
Start: 2023-09-05 | End: 1900-01-01

## 2023-09-13 ENCOUNTER — APPOINTMENT (OUTPATIENT)
Dept: SURGERY | Facility: CLINIC | Age: 58
End: 2023-09-13
Payer: MEDICAID

## 2023-09-13 VITALS
SYSTOLIC BLOOD PRESSURE: 126 MMHG | OXYGEN SATURATION: 98 % | HEART RATE: 75 BPM | HEIGHT: 65 IN | WEIGHT: 240 LBS | BODY MASS INDEX: 39.99 KG/M2 | TEMPERATURE: 98.5 F | DIASTOLIC BLOOD PRESSURE: 32 MMHG

## 2023-09-13 DIAGNOSIS — Z87.898 PERSONAL HISTORY OF OTHER SPECIFIED CONDITIONS: ICD-10-CM

## 2023-09-13 PROCEDURE — 99212 OFFICE O/P EST SF 10 MIN: CPT

## 2023-09-17 NOTE — PATIENT PROFILE ADULT - FALL HARM RISK - FALLEN IN PAST
pt a&ox3, vss, c/o vaginal bleeding that started today, pt is aprox. 7 weeks pregnant pt has not seen obgyn yet, pt in NAD @ this time, POC discussed, respirations even and unlabored. meds gvn per rx. will continue to reassess.
No

## 2023-10-31 NOTE — ED ADULT NURSE NOTE - OBJECTIVE STATEMENT
Pediatric Sick Visit        Subjective   Patient ID: Ena is a 8 year old 2014 female   Accompanied by:Pediatric Historian: mother    Chief Complaint   Patient presents with   • Office Visit     Ear pain, cough        Visit Vitals  Temp 97 °F (36.1 °C) (Temporal)   Wt 37.8 kg (83 lb 6.4 oz)        HISTORY:  No birth history on file.     Current Outpatient Medications   Medication Sig Dispense Refill          • ondansetron (Zofran ODT) 4 MG disintegrating tablet Place 1 tablet onto the tongue every 8 hours as needed for Nausea. 4 tablet 0     No current facility-administered medications for this visit.       ALLERGIES:  No Known Allergies      HPI:  8 yr old with illness for a week . Started w vomiting one day 7 d ago. Stayed home 2 d. Then developed cough. Cough for 7 d. Mom heard her up coughing in night at 5 am. This am she had ear ache on left. Felt ear popping last night after shower, felt like water in ear. No fever. No chest pain or shortness of breath. Blowing nose today.     Review of Systems   Constitutional: Negative for appetite change, chills and fever.   HENT: Negative for ear discharge, rhinorrhea and sore throat.    Respiratory: Negative for shortness of breath and wheezing.    Gastrointestinal: Negative for nausea and vomiting.   Neurological: Negative for headaches.     All other systems reviewed and are negative.    Objective   Vitals:Temp 97 °F (36.1 °C) (Temporal)   Wt 37.8 kg (83 lb 6.4 oz)   Physical Exam  Vitals reviewed.   Constitutional:       General: She is not in acute distress.  HENT:      Right Ear: Tympanic membrane normal.      Left Ear: Tympanic membrane is erythematous and bulging.      Nose: Congestion present.      Mouth/Throat:      Mouth: Mucous membranes are moist.      Pharynx: Oropharynx is clear. No posterior oropharyngeal erythema.      Neck: Normal range of motion and neck supple.   Eyes:      Conjunctiva/sclera: Conjunctivae normal.      Pupils: Pupils are equal,  round, and reactive to light.   Cardiovascular:      Rate and Rhythm: Normal rate and regular rhythm.      Heart sounds: Normal heart sounds.   Pulmonary:      Effort: Pulmonary effort is normal. No respiratory distress.      Breath sounds: Normal breath sounds. No wheezing or rales.   Lymphadenopathy:      Cervical: No cervical adenopathy.   Skin:     General: Skin is warm and dry.      Capillary Refill: Capillary refill takes less than 2 seconds.      Findings: No rash.   Neurological:      General: No focal deficit present.      Mental Status: She is alert.   Psychiatric:         Mood and Affect: Mood normal.         Assessment   Problem List Items Addressed This Visit    None  Visit Diagnoses     Left acute suppurative otitis media    -  Primary  URI    Relevant Medications    amoxicillin (AMOXIL) 875 MG tablet BID 10 days.  Ok to hold off on treatment as no fever and normal lung exam.  Mom will fill later this week if not improving or fever develops.          Orders Placed This Encounter   • amoxicillin (AMOXIL) 875 MG tablet       There are no Patient Instructions on file for this visit.   Return if symptoms worsen or fail to improve.   Anisa Yanez MD  [address] [facility]  [address]  [place] [address] [phone]        Continue Supportive care, maintain hydration. Call the office for new/worsening symptoms including but not limited to fever >/= 101F , decreasing po or uo, resp distress. Parents educated on signs/symptoms requiring immediate medical attention.       The patient and parent indicated understanding of the diagnosis and agreed with the plan of care. All questions answered.            B/L knee pain x 3 weeks, worse while climbing ladder. States B/L knee pain since fall 4 years ago

## 2023-12-20 ENCOUNTER — EMERGENCY (EMERGENCY)
Facility: HOSPITAL | Age: 58
LOS: 0 days | Discharge: HOME | End: 2023-12-20
Attending: EMERGENCY MEDICINE
Payer: MEDICAID

## 2023-12-20 VITALS
RESPIRATION RATE: 17 BRPM | TEMPERATURE: 98 F | SYSTOLIC BLOOD PRESSURE: 132 MMHG | OXYGEN SATURATION: 98 % | DIASTOLIC BLOOD PRESSURE: 78 MMHG | HEART RATE: 76 BPM

## 2023-12-20 VITALS
TEMPERATURE: 98 F | SYSTOLIC BLOOD PRESSURE: 127 MMHG | RESPIRATION RATE: 18 BRPM | DIASTOLIC BLOOD PRESSURE: 78 MMHG | OXYGEN SATURATION: 99 % | HEART RATE: 81 BPM

## 2023-12-20 DIAGNOSIS — R07.89 OTHER CHEST PAIN: ICD-10-CM

## 2023-12-20 DIAGNOSIS — I48.91 UNSPECIFIED ATRIAL FIBRILLATION: ICD-10-CM

## 2023-12-20 DIAGNOSIS — I49.3 VENTRICULAR PREMATURE DEPOLARIZATION: ICD-10-CM

## 2023-12-20 DIAGNOSIS — I10 ESSENTIAL (PRIMARY) HYPERTENSION: ICD-10-CM

## 2023-12-20 DIAGNOSIS — Z79.01 LONG TERM (CURRENT) USE OF ANTICOAGULANTS: ICD-10-CM

## 2023-12-20 DIAGNOSIS — Z98.890 OTHER SPECIFIED POSTPROCEDURAL STATES: Chronic | ICD-10-CM

## 2023-12-20 DIAGNOSIS — R10.13 EPIGASTRIC PAIN: ICD-10-CM

## 2023-12-20 DIAGNOSIS — J45.909 UNSPECIFIED ASTHMA, UNCOMPLICATED: ICD-10-CM

## 2023-12-20 DIAGNOSIS — R10.31 RIGHT LOWER QUADRANT PAIN: ICD-10-CM

## 2023-12-20 DIAGNOSIS — E78.5 HYPERLIPIDEMIA, UNSPECIFIED: ICD-10-CM

## 2023-12-20 LAB
ALBUMIN SERPL ELPH-MCNC: 3.6 G/DL — SIGNIFICANT CHANGE UP (ref 3.3–5)
ALBUMIN SERPL ELPH-MCNC: 3.6 G/DL — SIGNIFICANT CHANGE UP (ref 3.3–5)
ALP SERPL-CCNC: 77 U/L — SIGNIFICANT CHANGE UP (ref 40–120)
ALP SERPL-CCNC: 77 U/L — SIGNIFICANT CHANGE UP (ref 40–120)
ALT FLD-CCNC: 55 U/L — SIGNIFICANT CHANGE UP (ref 12–78)
ALT FLD-CCNC: 55 U/L — SIGNIFICANT CHANGE UP (ref 12–78)
ANION GAP SERPL CALC-SCNC: 5 MMOL/L — SIGNIFICANT CHANGE UP (ref 5–17)
ANION GAP SERPL CALC-SCNC: 5 MMOL/L — SIGNIFICANT CHANGE UP (ref 5–17)
APPEARANCE UR: CLEAR — SIGNIFICANT CHANGE UP
APPEARANCE UR: CLEAR — SIGNIFICANT CHANGE UP
APTT BLD: 39.4 SEC — HIGH (ref 24.5–35.6)
APTT BLD: 39.4 SEC — HIGH (ref 24.5–35.6)
AST SERPL-CCNC: 34 U/L — SIGNIFICANT CHANGE UP (ref 15–37)
AST SERPL-CCNC: 34 U/L — SIGNIFICANT CHANGE UP (ref 15–37)
BASOPHILS # BLD AUTO: 0.03 K/UL — SIGNIFICANT CHANGE UP (ref 0–0.2)
BASOPHILS # BLD AUTO: 0.03 K/UL — SIGNIFICANT CHANGE UP (ref 0–0.2)
BASOPHILS NFR BLD AUTO: 0.3 % — SIGNIFICANT CHANGE UP (ref 0–2)
BASOPHILS NFR BLD AUTO: 0.3 % — SIGNIFICANT CHANGE UP (ref 0–2)
BILIRUB SERPL-MCNC: 1 MG/DL — SIGNIFICANT CHANGE UP (ref 0.2–1.2)
BILIRUB SERPL-MCNC: 1 MG/DL — SIGNIFICANT CHANGE UP (ref 0.2–1.2)
BILIRUB UR-MCNC: NEGATIVE — SIGNIFICANT CHANGE UP
BILIRUB UR-MCNC: NEGATIVE — SIGNIFICANT CHANGE UP
BUN SERPL-MCNC: 10 MG/DL — SIGNIFICANT CHANGE UP (ref 7–23)
BUN SERPL-MCNC: 10 MG/DL — SIGNIFICANT CHANGE UP (ref 7–23)
CALCIUM SERPL-MCNC: 8.6 MG/DL — SIGNIFICANT CHANGE UP (ref 8.5–10.1)
CALCIUM SERPL-MCNC: 8.6 MG/DL — SIGNIFICANT CHANGE UP (ref 8.5–10.1)
CHLORIDE SERPL-SCNC: 111 MMOL/L — HIGH (ref 96–108)
CHLORIDE SERPL-SCNC: 111 MMOL/L — HIGH (ref 96–108)
CO2 SERPL-SCNC: 26 MMOL/L — SIGNIFICANT CHANGE UP (ref 22–31)
CO2 SERPL-SCNC: 26 MMOL/L — SIGNIFICANT CHANGE UP (ref 22–31)
COLOR SPEC: YELLOW — SIGNIFICANT CHANGE UP
COLOR SPEC: YELLOW — SIGNIFICANT CHANGE UP
CREAT SERPL-MCNC: 0.94 MG/DL — SIGNIFICANT CHANGE UP (ref 0.5–1.3)
CREAT SERPL-MCNC: 0.94 MG/DL — SIGNIFICANT CHANGE UP (ref 0.5–1.3)
DIFF PNL FLD: NEGATIVE — SIGNIFICANT CHANGE UP
DIFF PNL FLD: NEGATIVE — SIGNIFICANT CHANGE UP
EGFR: 93 ML/MIN/1.73M2 — SIGNIFICANT CHANGE UP
EGFR: 93 ML/MIN/1.73M2 — SIGNIFICANT CHANGE UP
EOSINOPHIL # BLD AUTO: 0.06 K/UL — SIGNIFICANT CHANGE UP (ref 0–0.5)
EOSINOPHIL # BLD AUTO: 0.06 K/UL — SIGNIFICANT CHANGE UP (ref 0–0.5)
EOSINOPHIL NFR BLD AUTO: 0.6 % — SIGNIFICANT CHANGE UP (ref 0–6)
EOSINOPHIL NFR BLD AUTO: 0.6 % — SIGNIFICANT CHANGE UP (ref 0–6)
GLUCOSE SERPL-MCNC: 100 MG/DL — HIGH (ref 70–99)
GLUCOSE SERPL-MCNC: 100 MG/DL — HIGH (ref 70–99)
GLUCOSE UR QL: NEGATIVE MG/DL — SIGNIFICANT CHANGE UP
GLUCOSE UR QL: NEGATIVE MG/DL — SIGNIFICANT CHANGE UP
HCT VFR BLD CALC: 49.7 % — SIGNIFICANT CHANGE UP (ref 39–50)
HCT VFR BLD CALC: 49.7 % — SIGNIFICANT CHANGE UP (ref 39–50)
HGB BLD-MCNC: 16.4 G/DL — SIGNIFICANT CHANGE UP (ref 13–17)
HGB BLD-MCNC: 16.4 G/DL — SIGNIFICANT CHANGE UP (ref 13–17)
IMM GRANULOCYTES NFR BLD AUTO: 1.3 % — HIGH (ref 0–0.9)
IMM GRANULOCYTES NFR BLD AUTO: 1.3 % — HIGH (ref 0–0.9)
INR BLD: 1.08 RATIO — SIGNIFICANT CHANGE UP (ref 0.85–1.18)
INR BLD: 1.08 RATIO — SIGNIFICANT CHANGE UP (ref 0.85–1.18)
KETONES UR-MCNC: NEGATIVE MG/DL — SIGNIFICANT CHANGE UP
KETONES UR-MCNC: NEGATIVE MG/DL — SIGNIFICANT CHANGE UP
LEUKOCYTE ESTERASE UR-ACNC: NEGATIVE — SIGNIFICANT CHANGE UP
LEUKOCYTE ESTERASE UR-ACNC: NEGATIVE — SIGNIFICANT CHANGE UP
LIDOCAIN IGE QN: 19 U/L — SIGNIFICANT CHANGE UP (ref 13–75)
LIDOCAIN IGE QN: 19 U/L — SIGNIFICANT CHANGE UP (ref 13–75)
LYMPHOCYTES # BLD AUTO: 1.61 K/UL — SIGNIFICANT CHANGE UP (ref 1–3.3)
LYMPHOCYTES # BLD AUTO: 1.61 K/UL — SIGNIFICANT CHANGE UP (ref 1–3.3)
LYMPHOCYTES # BLD AUTO: 17.3 % — SIGNIFICANT CHANGE UP (ref 13–44)
LYMPHOCYTES # BLD AUTO: 17.3 % — SIGNIFICANT CHANGE UP (ref 13–44)
MCHC RBC-ENTMCNC: 29.3 PG — SIGNIFICANT CHANGE UP (ref 27–34)
MCHC RBC-ENTMCNC: 29.3 PG — SIGNIFICANT CHANGE UP (ref 27–34)
MCHC RBC-ENTMCNC: 33 GM/DL — SIGNIFICANT CHANGE UP (ref 32–36)
MCHC RBC-ENTMCNC: 33 GM/DL — SIGNIFICANT CHANGE UP (ref 32–36)
MCV RBC AUTO: 88.9 FL — SIGNIFICANT CHANGE UP (ref 80–100)
MCV RBC AUTO: 88.9 FL — SIGNIFICANT CHANGE UP (ref 80–100)
MONOCYTES # BLD AUTO: 0.63 K/UL — SIGNIFICANT CHANGE UP (ref 0–0.9)
MONOCYTES # BLD AUTO: 0.63 K/UL — SIGNIFICANT CHANGE UP (ref 0–0.9)
MONOCYTES NFR BLD AUTO: 6.8 % — SIGNIFICANT CHANGE UP (ref 2–14)
MONOCYTES NFR BLD AUTO: 6.8 % — SIGNIFICANT CHANGE UP (ref 2–14)
NEUTROPHILS # BLD AUTO: 6.84 K/UL — SIGNIFICANT CHANGE UP (ref 1.8–7.4)
NEUTROPHILS # BLD AUTO: 6.84 K/UL — SIGNIFICANT CHANGE UP (ref 1.8–7.4)
NEUTROPHILS NFR BLD AUTO: 73.7 % — SIGNIFICANT CHANGE UP (ref 43–77)
NEUTROPHILS NFR BLD AUTO: 73.7 % — SIGNIFICANT CHANGE UP (ref 43–77)
NITRITE UR-MCNC: NEGATIVE — SIGNIFICANT CHANGE UP
NITRITE UR-MCNC: NEGATIVE — SIGNIFICANT CHANGE UP
NT-PROBNP SERPL-SCNC: 1452 PG/ML — HIGH (ref 0–125)
NT-PROBNP SERPL-SCNC: 1452 PG/ML — HIGH (ref 0–125)
PH UR: 6 — SIGNIFICANT CHANGE UP (ref 5–8)
PH UR: 6 — SIGNIFICANT CHANGE UP (ref 5–8)
PLATELET # BLD AUTO: 183 K/UL — SIGNIFICANT CHANGE UP (ref 150–400)
PLATELET # BLD AUTO: 183 K/UL — SIGNIFICANT CHANGE UP (ref 150–400)
POTASSIUM SERPL-MCNC: 3.7 MMOL/L — SIGNIFICANT CHANGE UP (ref 3.5–5.3)
POTASSIUM SERPL-MCNC: 3.7 MMOL/L — SIGNIFICANT CHANGE UP (ref 3.5–5.3)
POTASSIUM SERPL-SCNC: 3.7 MMOL/L — SIGNIFICANT CHANGE UP (ref 3.5–5.3)
POTASSIUM SERPL-SCNC: 3.7 MMOL/L — SIGNIFICANT CHANGE UP (ref 3.5–5.3)
PROT SERPL-MCNC: 6.8 GM/DL — SIGNIFICANT CHANGE UP (ref 6–8.3)
PROT SERPL-MCNC: 6.8 GM/DL — SIGNIFICANT CHANGE UP (ref 6–8.3)
PROT UR-MCNC: NEGATIVE MG/DL — SIGNIFICANT CHANGE UP
PROT UR-MCNC: NEGATIVE MG/DL — SIGNIFICANT CHANGE UP
PROTHROM AB SERPL-ACNC: 12.2 SEC — SIGNIFICANT CHANGE UP (ref 9.5–13)
PROTHROM AB SERPL-ACNC: 12.2 SEC — SIGNIFICANT CHANGE UP (ref 9.5–13)
RBC # BLD: 5.59 M/UL — SIGNIFICANT CHANGE UP (ref 4.2–5.8)
RBC # BLD: 5.59 M/UL — SIGNIFICANT CHANGE UP (ref 4.2–5.8)
RBC # FLD: 14.9 % — HIGH (ref 10.3–14.5)
RBC # FLD: 14.9 % — HIGH (ref 10.3–14.5)
SODIUM SERPL-SCNC: 142 MMOL/L — SIGNIFICANT CHANGE UP (ref 135–145)
SODIUM SERPL-SCNC: 142 MMOL/L — SIGNIFICANT CHANGE UP (ref 135–145)
SP GR SPEC: 1.01 — SIGNIFICANT CHANGE UP (ref 1–1.03)
SP GR SPEC: 1.01 — SIGNIFICANT CHANGE UP (ref 1–1.03)
TROPONIN I, HIGH SENSITIVITY RESULT: 13.73 NG/L — SIGNIFICANT CHANGE UP
TROPONIN I, HIGH SENSITIVITY RESULT: 13.73 NG/L — SIGNIFICANT CHANGE UP
TROPONIN I, HIGH SENSITIVITY RESULT: 16.74 NG/L — SIGNIFICANT CHANGE UP
TROPONIN I, HIGH SENSITIVITY RESULT: 16.74 NG/L — SIGNIFICANT CHANGE UP
UROBILINOGEN FLD QL: 0.2 MG/DL — SIGNIFICANT CHANGE UP (ref 0.2–1)
UROBILINOGEN FLD QL: 0.2 MG/DL — SIGNIFICANT CHANGE UP (ref 0.2–1)
WBC # BLD: 9.29 K/UL — SIGNIFICANT CHANGE UP (ref 3.8–10.5)
WBC # BLD: 9.29 K/UL — SIGNIFICANT CHANGE UP (ref 3.8–10.5)
WBC # FLD AUTO: 9.29 K/UL — SIGNIFICANT CHANGE UP (ref 3.8–10.5)
WBC # FLD AUTO: 9.29 K/UL — SIGNIFICANT CHANGE UP (ref 3.8–10.5)

## 2023-12-20 PROCEDURE — 83880 ASSAY OF NATRIURETIC PEPTIDE: CPT

## 2023-12-20 PROCEDURE — 80053 COMPREHEN METABOLIC PANEL: CPT

## 2023-12-20 PROCEDURE — 81003 URINALYSIS AUTO W/O SCOPE: CPT

## 2023-12-20 PROCEDURE — 74177 CT ABD & PELVIS W/CONTRAST: CPT | Mod: 26,MA

## 2023-12-20 PROCEDURE — 93010 ELECTROCARDIOGRAM REPORT: CPT

## 2023-12-20 PROCEDURE — 74177 CT ABD & PELVIS W/CONTRAST: CPT | Mod: MA

## 2023-12-20 PROCEDURE — 85610 PROTHROMBIN TIME: CPT

## 2023-12-20 PROCEDURE — 99285 EMERGENCY DEPT VISIT HI MDM: CPT | Mod: 25

## 2023-12-20 PROCEDURE — 84484 ASSAY OF TROPONIN QUANT: CPT

## 2023-12-20 PROCEDURE — 36415 COLL VENOUS BLD VENIPUNCTURE: CPT

## 2023-12-20 PROCEDURE — 85025 COMPLETE CBC W/AUTO DIFF WBC: CPT

## 2023-12-20 PROCEDURE — 83690 ASSAY OF LIPASE: CPT

## 2023-12-20 PROCEDURE — 99285 EMERGENCY DEPT VISIT HI MDM: CPT

## 2023-12-20 PROCEDURE — 93005 ELECTROCARDIOGRAM TRACING: CPT

## 2023-12-20 PROCEDURE — 71275 CT ANGIOGRAPHY CHEST: CPT | Mod: MA

## 2023-12-20 PROCEDURE — 71275 CT ANGIOGRAPHY CHEST: CPT | Mod: 26,MA

## 2023-12-20 PROCEDURE — 85730 THROMBOPLASTIN TIME PARTIAL: CPT

## 2023-12-20 NOTE — ED STATDOCS - NS_ATTENDINGSCRIBE_ED_ALL_ED
How Severe Are Your Spot(S)?: mild Have Your Spot(S) Been Treated In The Past?: has not been treated Hpi Title: Evaluation of Skin Lesions Additional History: Patient is here for evaluation of rosacea and lesion on her nose. Patient is also doing FBSE. I personally performed the service described in the documentation recorded by the scribe in my presence, and it accurately and completely records my words and actions.

## 2023-12-20 NOTE — ED STATDOCS - PATIENT PORTAL LINK FT
You can access the FollowMyHealth Patient Portal offered by [facility] by registering at the following website: [email or web address]. By joining [employer]’s FollowMyHealth portal, you will also be able to view your health information using other applications (apps) compatible with our system. You can access the FollowMyHealth Patient Portal offered by [facility] by registering at the following website: [email or web address]. By joining [employer]’s FollowMyHealth portal, you will also be able to view your health information using other applications (apps) compatible with our system.

## 2023-12-20 NOTE — ED STATDOCS - PROGRESS NOTE DETAILS
58 year old male presents to ED c/o chest/epigastric pain and RLQ abdominal pain that radiates into back since yesterday a/w orthopnea last night. No other complaints at this time. Vitals are stable on arrival, PE is nonfocal. Will follow labs, trop, CT chest/abdomen/pelvis, urine, reassess. - Sisi Ryan PA-C Labs and imaging reviewed. BNP 1400, at baseline compared to old BNP levels. Trop negative, rest of labs within normal limits. CT chest and a/p no acute abnormalities known, no PE or CHF. Discussed findings with patient. Will trend 2nd troponin and reeval.  ID# [id number]. - Sisi Ryan PA-C Labs and imaging reviewed. BNP 1400, at baseline compared to old BNP levels. Trop negative, rest of labs within normal limits. CT chest and a/p no acute abnormalities known, no PE or CHF. Discussed findings with patient. Will trend 2nd troponin and reeval.  ID# [id number]. - Sisi Ryan PA-C 2nd trop negative. Stable for dc home with PCP and cardiology f/u. Strict return precautions were given. All questions and concerns were addressed. - LINDA ValleC

## 2023-12-20 NOTE — ED ADULT TRIAGE NOTE - CHIEF COMPLAINT QUOTE
pt c/o cp/ abdominal pain/epigastric pain , sweating x 1 day pt states " I feel like I am going to die". pmh: HTN, DMII, high ldl. ekg in triage.

## 2023-12-20 NOTE — ED ADULT NURSE NOTE - OBJECTIVE STATEMENT
pt presenting to the ed c/o epigastric pain x last night. pt states he has had increasing chest discomfort and tightness with sob. pt denies n/v/d, recent fevers. pt seen and treated by cardiologist, pt on eliquis.

## 2023-12-20 NOTE — ED STATDOCS - OBJECTIVE STATEMENT
59 y/o male with PMHx of Afib on Eliquis, intermittent pedal edema, asthma, hernia repairs x3, HLD, PNA in September 2022, and HTN presents to ED c/o lower chest pain/epigastric abdominal pain that travels to Q and then to his back since yesterday. Noted last night he had orthopnea, reports he was also having chest pain. No coughs, fever, or vomiting. Nonsmoker. No EtOH use.  used, id# [id number]. 59 y/o male with PMHx of Afib on Eliquis, intermittent pedal edema, asthma, hernia repairs x3, HLD, PNA in September 2022, and HTN presents to ED c/o lower chest pain/epigastric abdominal pain that travels to Q and then to his back since yesterday. Noted last night he had orthopnea, reports he was also having chest pain. No coughs, fever, or vomiting. Nonsmoker. No EtOH use.  used, id# [id number].

## 2023-12-20 NOTE — ED STATDOCS - CLINICAL SUMMARY MEDICAL DECISION MAKING FREE TEXT BOX
Pt with multiple comorbidities and multiple complaints including chest pain, abdominal pain and orthopnea. Will do cardiac eval, CT chest/abd/pelv, and reeval.

## 2023-12-20 NOTE — ED STATDOCS - PHYSICAL EXAMINATION
Constitutional: NAD AOx3. +obese  Eyes: PERRL EOMI  Head: Normocephalic atraumatic  Mouth: MMM  Cardiac: irregularly irregular rate and rhythm  Resp: Lungs CTAB  GI: Abd s/nd/nt  Neuro: CN2-12 grossly intact, MANZANARES x 4  Skin: No visible rashes

## 2023-12-20 NOTE — ED ADULT NURSE NOTE - IS THE PATIENT ABLE TO BE SCREENED?
Stop senna liquid - not taking secondary to taste  Try pericolcae pill once daily for senna dosing  + stool softener    Miralax 1 cap every 1-2 days to keep stools soft and daily
Yes

## 2023-12-20 NOTE — ED ADULT NURSE NOTE - NSFALLUNIVINTERV_ED_ALL_ED
Bed/Stretcher in lowest position, wheels locked, appropriate side rails in place/Call bell, personal items and telephone in reach/Instruct patient to call for assistance before getting out of bed/chair/stretcher/Non-slip footwear applied when patient is off stretcher/[place] to call system/Physically safe environment - no spills, clutter or unnecessary equipment/Purposeful proactive rounding/Room/bathroom lighting operational, light cord in reach Bed/Stretcher in lowest position, wheels locked, appropriate side rails in place/Call bell, personal items and telephone in reach/Instruct patient to call for assistance before getting out of bed/chair/stretcher/Non-slip footwear applied when patient is off stretcher/[place] to call system/Physically safe environment - no spills, clutter or unnecessary equipment/Purposeful proactive rounding/Room/bathroom lighting operational, light cord in reach

## 2023-12-20 NOTE — ED STATDOCS - NSFOLLOWUPINSTRUCTIONS_ED_ALL_ED_FT
Amaury un seguimiento con el PCP y el cardiólogo. Regrese al servicio de urgencias si los síntomas son nuevos o empeoran.    Dolor de pecho no específico en los adultos  Nonspecific Chest Pain, Adult  El dolor de pecho es opal sensación molesta, opresiva o dolorosa en el pecho. El dolor se siente tressa opal aplastamiento, torsión u opresión en el pecho. Opal persona puede sentir opal sensación de ardor u hormigueo. El dolor de pecho también se puede sentir en la espalda, el lexi, la mandíbula, el hombro o el brazo. Aliyah dolor puede empeorar al moverse, estornudar o respirar profundamente.    El dolor de pecho puede deberse a opal afección potencialmente mortal. Se debe tratar de inmediato. También puede ser provocado por algo que no es potencialmente mortal. Si tiene dolor de pecho, puede ser difícil saber la diferencia, por lo tanto es importante que obtenga ayuda de inmediato para asegurarse de que no tiene opal afección grave.    Algunas causas potencialmente mortales del dolor de pecho incluyen:  Infarto de miocardio.  Un desgarro en el vaso sanguíneo principal del cuerpo (disección aórtica).  Inflamación alrededor del corazón (pericarditis).  Un problema en los pulmones, tressa un coágulo de bar (embolia pulmonar) o un pulmón colapsado (neumotórax).  Algunas causas de dolor de pecho que no son potencialmente mortales incluyen:  Acidez estomacal.  Ansiedad o estrés.  Daño de los huesos, los músculos y los cartílagos que conforman la pared torácica.  Neumonía o bronquitis.  Culebrilla (virus de la varicela zóster).  El dolor de pecho puede aparecer y desaparecer. También puede ser rena. Tai médico le hará pruebas clínicas y otros estudios para tratar de determinar la causa del dolor. El tratamiento dependerá de la causa del dolor de pecho.    Siga estas instrucciones en tai casa:  Medicamentos    Use los medicamentos de venta chayito y los recetados solamente tressa se lo haya indicado el médico.  Si le recetaron un antibiótico, tómelo tressa se lo haya indicado el médico. No deje de porfirio el antibiótico aunque comience a sentirse mejor.  Actividad    Evite las actividades que le causen dolor de pecho.  No levante ningún objeto que pese más de 10 libras (4,5 kg) o que supere el límite de peso que le hayan indicado, hasta que el médico le diga que puede hacerlo.  Amaury reposo tressa se lo haya indicado el médico.  Retome bernardo actividades normales solo tressa se lo haya indicado el médico. Pregúntele al médico qué actividades son seguras para usted.  Estilo de yudy    A plate along with examples of foods in a healthy diet.  Silhouette of a person sitting on the floor doing yoga.  No consuma ningún producto que contenga nicotina o tabaco, tressa cigarrillos, cigarrillos electrónicos y tabaco de mascar. Si necesita ayuda para dejar de fumar, consulte al médico.  No yesenia alcohol.  Opte por un estilo de yudy saludable tressa se lo hayan recomendado. Puede incluir:  Practicar actividad física con regularidad. Pídale al médico que le sugiera ejercicios que marisa seguros para usted.  Seguir opal dieta cardiosaludable. Esta debe incluir muchas frutas y verduras frescas, cereales integrales, proteínas (magras) con bajo contenido de grasa y productos lácteos bajos en grasa. Un nutricionista podrá ayudarlo a hacer elecciones de alimentación saludables.  Mantener un peso saludable.  Controlar cualquier otra afección que tenga, tressa presión arterial piter (hipertensión) o diabetes.  Disminuir el nivel de estrés; por ejemplo, con yoga o técnicas de relajación.  Instrucciones generales    Esté atento a cualquier cambio en los síntomas.  Es tai responsabilidad obtener los resultados de cualquier prueba que se haya realizado. Consulte al médico o pregunte en el departamento donde se realizan las pruebas cuándo estarán listos los resultados.  Concurra a todas las visitas de seguimiento tressa se lo haya indicado el médico. [place] es importante.  Es posible que le pidan que se someta a más pruebas si el dolor de pecho no desaparece.  Comuníquese con un médico si:  El dolor de pecho no desaparece.  Se siente deprimido.  Tiene fiebre.  Nota cambios en los síntomas o desarrolla síntomas nuevos.  Solicite ayuda de inmediato si:  El dolor en el pecho empeora.  Tiene tos que empeora o tose con bar.  Siente un dolor intenso en el abdomen.  Se desmaya.  Tiene opal molestia repentina e inexplicable en el pecho.  Tiene molestias repentinas e inexplicables en los brazos, la espalda, el lexi o la mandíbula.  Le falta el aire en cualquier momento.  Comienza a sudar de manera repentina o la piel se le humedece.  Siente náuseas o vomita.  Se siente repentinamente mareado o se desmaya.  Tiene debilidad intensa, o debilidad o fatiga sin explicación.  Siente que el corazón comienza a latir rápidamente o que se saltea latidos.  Estos síntomas pueden representar un problema grave que constituye opal emergencia. No espere a justus si los síntomas desaparecen. Solicite atención médica de inmediato. Comuníquese con el servicio de emergencias de tai localidad (911 en los [place]). No conduzca por bernardo propios medios hasta el hospital.    Resumen  El dolor de pecho puede ser provocado por opal afección que es grave y requiere tratamiento urgente. También puede ser provocado por algo que no es potencialmente mortal.  El médico puede hacerle pruebas clínicas y otros estudios para tratar de determinar la causa del dolor.  Siga las instrucciones de tai médico sobre porfirio medicamentos, hacer cambios en tai estilo de yudy y recibir tratamiento de urgencia si los síntomas empeoran.  Concurra a todas las visitas de seguimiento tressa se lo haya indicado el médico. [place] incluye las visitas para realizarle otras pruebas si el dolor de pecho no desaparece.  Esta información no tiene tressa fin reemplazar el consejo del médico. Asegúrese de hacerle al médico cualquier pregunta que tenga. Amaury un seguimiento con el PCP y el cardiólogo. Regrese al servicio de urgencias si los síntomas son nuevos o empeoran.    Dolor de pecho no específico en los adultos  Nonspecific Chest Pain, Adult  El dolor de pecho es opal sensación molesta, opresiva o dolorosa en el pecho. El dolor se siente tressa opal aplastamiento, torsión u opresión en el pecho. Opal persona puede sentir opal sensación de ardor u hormigueo. El dolor de pecho también se puede sentir en la espalda, el lexi, la mandíbula, el hombro o el brazo. Aliyah dolor puede empeorar al moverse, estornudar o respirar profundamente.    El dolor de pecho puede deberse a opal afección potencialmente mortal. Se debe tratar de inmediato. También puede ser provocado por algo que no es potencialmente mortal. Si tiene dolor de pecho, puede ser difícil saber la diferencia, por lo tanto es importante que obtenga ayuda de inmediato para asegurarse de que no tiene opal afección grave.    Algunas causas potencialmente mortales del dolor de pecho incluyen:  Infarto de miocardio.  Un desgarro en el vaso sanguíneo principal del cuerpo (disección aórtica).  Inflamación alrededor del corazón (pericarditis).  Un problema en los pulmones, tressa un coágulo de bar (embolia pulmonar) o un pulmón colapsado (neumotórax).  Algunas causas de dolor de pecho que no son potencialmente mortales incluyen:  Acidez estomacal.  Ansiedad o estrés.  Daño de los huesos, los músculos y los cartílagos que conforman la pared torácica.  Neumonía o bronquitis.  Culebrilla (virus de la varicela zóster).  El dolor de pecho puede aparecer y desaparecer. También puede ser rena. Tai médico le hará pruebas clínicas y otros estudios para tratar de determinar la causa del dolor. El tratamiento dependerá de la causa del dolor de pecho.    Siga estas instrucciones en tai casa:  Medicamentos    Use los medicamentos de venta chayito y los recetados solamente tressa se lo haya indicado el médico.  Si le recetaron un antibiótico, tómelo tressa se lo haya indicado el médico. No deje de porfirio el antibiótico aunque comience a sentirse mejor.  Actividad    Evite las actividades que le causen dolor de pecho.  No levante ningún objeto que pese más de 10 libras (4,5 kg) o que supere el límite de peso que le hayan indicado, hasta que el médico le diga que puede hacerlo.  Amaury reposo tressa se lo haya indicado el médico.  Retome bernardo actividades normales solo tressa se lo haya indicado el médico. Pregúntele al médico qué actividades son seguras para usted.  Estilo de yudy    A plate along with examples of foods in a healthy diet.  Silhouette of a person sitting on the floor doing yoga.  No consuma ningún producto que contenga nicotina o tabaco, tressa cigarrillos, cigarrillos electrónicos y tabaco de mascar. Si necesita ayuda para dejar de fumar, consulte al médico.  No yesenia alcohol.  Opte por un estilo de yudy saludable tressa se lo hayan recomendado. Puede incluir:  Practicar actividad física con regularidad. Pídale al médico que le sugiera ejercicios que marisa seguros para usted.  Seguir opal dieta cardiosaludable. Esta debe incluir muchas frutas y verduras frescas, cereales integrales, proteínas (magras) con bajo contenido de grasa y productos lácteos bajos en grasa. Un nutricionista podrá ayudarlo a hacer elecciones de alimentación saludables.  Mantener un peso saludable.  Controlar cualquier otra afección que tenga, tressa presión arterial piter (hipertensión) o diabetes.  Disminuir el nivel de estrés; por ejemplo, con yoga o técnicas de relajación.  Instrucciones generales    Esté atento a cualquier cambio en los síntomas.  Es tai responsabilidad obtener los resultados de cualquier prueba que se haya realizado. Consulte al médico o pregunte en el departamento donde se realizan las pruebas cuándo estarán listos los resultados.  Concurra a todas las visitas de seguimiento tressa se lo haya indicado el médico. [place] es importante.  Es posible que le pidan que se someta a más pruebas si el dolor de pecho no desaparece.  Comuníquese con un médico si:  El dolor de pecho no desaparece.  Se siente deprimido.  Tiene fiebre.  Nota cambios en los síntomas o desarrolla síntomas nuevos.  Solicite ayuda de inmediato si:  El dolor en el pecho empeora.  Tiene tos que empeora o tose con bar.  Siente un dolor intenso en el abdomen.  Se desmaya.  Tiene opal molestia repentina e inexplicable en el pecho.  Tiene molestias repentinas e inexplicables en los brazos, la espalda, el lexi o la mandíbula.  Le falta el aire en cualquier momento.  Comienza a sudar de manera repentina o la piel se le humedece.  Siente náuseas o vomita.  Se siente repentinamente mareado o se desmaya.  Tiene debilidad intensa, o debilidad o fatiga sin explicación.  Siente que el corazón comienza a latir rápidamente o que se saltea latidos.  Estos síntomas pueden representar un problema grave que constituye opal emergencia. No espere a justus si los síntomas desaparecen. Solicite atención médica de inmediato. Comuníquese con el servicio de emergencias de tai localidad (911 en [place]). No conduzca por bernardo propios medios hasta el hospital.    Resumen  El dolor de pecho puede ser provocado por opal afección que es grave y requiere tratamiento urgente. También puede ser provocado por algo que no es potencialmente mortal.  El médico puede hacerle pruebas clínicas y otros estudios para tratar de determinar la causa del dolor.  Siga las instrucciones de tai médico sobre porfirio medicamentos, hacer cambios en tai estilo de yudy y recibir tratamiento de urgencia si los síntomas empeoran.  Concurra a todas las visitas de seguimiento tressa se lo haya indicado el médico. [place] incluye las visitas para realizarle otras pruebas si el dolor de pecho no desaparece.  Esta información no tiene tressa fin reemplazar el consejo del médico. Asegúrese de hacerle al médico cualquier pregunta que tenga.

## 2024-01-24 NOTE — ED STATDOCS - NS ED MD DISPO DISCHARGE CCDA
PLAN: 19 y.o. male in the ICU due to: intubation: intubated/ sedated, MAP goals, trickle feeds through NG, trend labs, ENT following, skin, line, ICU tlc.     PAYOR: [employer]     DISPO: home     SUPPORT/CONTACT: Ashley [phone]  Met with patient and mother to complete assessment. Confirmed address on file, patient lives with mother. IPTA, confirmed insurance with mom, no PCP. Anticipate home with HH vs home with outpatient follow-up. Available to assist as needed.      Patient/Caregiver provided printed discharge information.

## 2024-04-02 ENCOUNTER — APPOINTMENT (OUTPATIENT)
Dept: ELECTROPHYSIOLOGY | Facility: CLINIC | Age: 59
End: 2024-04-02
Payer: COMMERCIAL

## 2024-04-02 VITALS
WEIGHT: 240 LBS | RESPIRATION RATE: 14 BRPM | BODY MASS INDEX: 38.57 KG/M2 | HEIGHT: 66 IN | HEART RATE: 84 BPM | OXYGEN SATURATION: 96 % | SYSTOLIC BLOOD PRESSURE: 123 MMHG | DIASTOLIC BLOOD PRESSURE: 81 MMHG

## 2024-04-02 DIAGNOSIS — I48.0 PAROXYSMAL ATRIAL FIBRILLATION: ICD-10-CM

## 2024-04-02 PROCEDURE — 93000 ELECTROCARDIOGRAM COMPLETE: CPT

## 2024-04-02 PROCEDURE — 99205 OFFICE O/P NEW HI 60 MIN: CPT | Mod: 25

## 2024-04-02 NOTE — REASON FOR VISIT
[Arrhythmia/ECG Abnorrmalities] : arrhythmia/ECG abnormalities [[employer] ] : provided by [employer]   [Time Spent: ____ minutes] : Total time spent using  services: [unfilled] minutes. The patient's primary language is not English thus required  services. [FreeTextEntry3] : Dr. Samuel [Interpreters_IDNumber] : [id number] [TWNoteComboBox1] : [place]oc[place] [Interpreters_FullName] : Josue

## 2024-04-02 NOTE — PHYSICAL EXAM
[Well Nourished] : well nourished [Well Developed] : well developed [No Acute Distress] : no acute distress [Normal Conjunctiva] : normal conjunctiva [Normal Venous Pressure] : normal venous pressure [Normal S1, S2] : normal S1, S2 [No Murmur] : no murmur [No Rub] : no rub [No Gallop] : no gallop [Clear Lung Fields] : clear lung fields [Good Air Entry] : good air entry [No Respiratory Distress] : no respiratory distress  [Soft] : abdomen soft [Non Tender] : non-tender [Normal Bowel Sounds] : normal bowel sounds [No Edema] : no edema [Normal Gait] : normal gait [No Cyanosis] : no cyanosis [No Rash] : no rash [Moves all extremities] : moves all extremities [No Focal Deficits] : no focal deficits [Normal Speech] : normal speech [Alert and Oriented] : alert and oriented [Normal memory] : normal memory

## 2024-04-02 NOTE — CARDIOLOGY SUMMARY
[de-identified] : today: MICHELLE @ 80bpm [de-identified] : 9/2022: Severely dilated LA, mild pHTN, moderate MR, EF 50-55%, mildly dilated RV, moderate-severely dilated RA [de-identified] : NST 10/4/22: EF 57%, small fixed perfusion defect in the apical segment of the inferior wall of the LV.  No scan evidence of reversible perfusion defects.  No regional wall motion abnormalities

## 2024-04-02 NOTE — HISTORY OF PRESENT ILLNESS
[FreeTextEntry1] : I had the pleasure of seeing Renan Snyder today for consultation for AF management in the arrhythmia clinic at [facility]. As you well know, he is a pleasant 58 year old gentleman with a history of hypertension, diabetes, and persistent atrial fibrillation that was diagnosed at the end of September 2022 in the setting of COVID.  At that time, he was hospitalized with viral symptoms and diagnosed with COVID.  His ECG at that time showed atrial fibrillation.  His AF persisted throughout his hospitalization and no intervention has been done in regard to his atrial fibrillation.  All subsequent EKGs since the hospitalization has shown atrial fibrillation.  Patient does report feeling fatigued with a decline in his exercise tolerance.  He reports some dyspnea on exertion.  He recently went to [facility] for 2 weeks in March and when he returned, he reports that he actually has felt better.  He is able to walk without any limitations and denies currently any shortness of breath.  He denies any chest pain, palpitations, near-syncope, or syncope.  He does not have his list of medications with him although he does report that he is on Eliquis 5 mg twice daily which she has been compliant with.

## 2024-04-02 NOTE — DISCUSSION/SUMMARY
[EKG obtained to assist in diagnosis and management of assessed problem(s)] : EKG obtained to assist in diagnosis and management of assessed problem(s) [FreeTextEntry1] : In summary, Mr. Snyder is a 58-year-old gentleman with a history of hypertension, hyperlipidemia, diabetes, and persistent atrial fibrillation since September 2022. We discussed the pathophysiology of atrial fibrillation including management strategies. We discussed rhythm control versus rate control strategies. We discussed options of medication management versus ablation. The options of a cardioversion along with an antiarrhythmic medication for maintenance of sinus rhythm as an initial step to see how he feels in sinus rhythm and a catheter ablation were discussed. Risks and benefits were discussed with each option. The procedures, outcomes and risks of ablation were reviewed. Risks discussed included but were not limited to bleeding, vascular damage, cardiac perforation, tamponade, phrenic nerve injury, stroke, pulmonary vein stenosis and atrial esophageal fistula.  After all questions were answered, it was a shared decision to proceed with an ablation.  He will continue his Eliquis including the morning of the procedure.  We will make arrangements to schedule his procedure.
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
## 2024-05-05 NOTE — ED STATDOCS - PROGRESS NOTE DETAILS
Yes 56 y/o M with PMH of a-fib, HLD, PNA, HTN, covid-19 presents with intermittent CP x 2 days, worse with minimal exertion such as going up stairs. +LE swelling which he states has been treated with diuretic. Denies fever, chills, cough, travel. States he had inguinal hernia repair apx 2 months ago. PE: Well appearing. Cardiac: irregularly irregular. Lungs: Decreased right base. PV: NO LE edema, calf tenderness. A/P: r/o PE, ACS, plan for labs, EKG, CXR, reassess. - Pierce Blandon PA-C Findings reviewed with patient. Dimer and troponin negative. Elevation in Pro-BNP, increased markings on CXR. Has cardiology follow up next week. WIll provide dose of lasix in ED and K+ (level 3.4 today). Plan for dc. - Pierce Blandon PA-C

## 2024-05-06 ENCOUNTER — INPATIENT (INPATIENT)
Facility: HOSPITAL | Age: 59
LOS: 3 days | Discharge: HOME | DRG: 113 | End: 2024-05-10
Attending: INTERNAL MEDICINE | Admitting: INTERNAL MEDICINE
Payer: COMMERCIAL

## 2024-05-06 VITALS — TEMPERATURE: 101 F | WEIGHT: 244.27 LBS | HEIGHT: 64 IN

## 2024-05-06 DIAGNOSIS — Z98.890 OTHER SPECIFIED POSTPROCEDURAL STATES: Chronic | ICD-10-CM

## 2024-05-06 DIAGNOSIS — J10.1 INFLUENZA DUE TO OTHER IDENTIFIED INFLUENZA VIRUS WITH OTHER RESPIRATORY MANIFESTATIONS: ICD-10-CM

## 2024-05-06 LAB
ALBUMIN SERPL ELPH-MCNC: 3.7 G/DL — SIGNIFICANT CHANGE UP (ref 3.3–5)
ALP SERPL-CCNC: 74 U/L — SIGNIFICANT CHANGE UP (ref 40–120)
ALT FLD-CCNC: 62 U/L — SIGNIFICANT CHANGE UP (ref 12–78)
ANION GAP SERPL CALC-SCNC: 7 MMOL/L — SIGNIFICANT CHANGE UP (ref 5–17)
APTT BLD: 35.3 SEC — SIGNIFICANT CHANGE UP (ref 24.5–35.6)
AST SERPL-CCNC: 36 U/L — SIGNIFICANT CHANGE UP (ref 15–37)
BASOPHILS # BLD AUTO: 0.02 K/UL — SIGNIFICANT CHANGE UP (ref 0–0.2)
BASOPHILS NFR BLD AUTO: 0.2 % — SIGNIFICANT CHANGE UP (ref 0–2)
BILIRUB SERPL-MCNC: 1.8 MG/DL — HIGH (ref 0.2–1.2)
BUN SERPL-MCNC: 15 MG/DL — SIGNIFICANT CHANGE UP (ref 7–23)
CALCIUM SERPL-MCNC: 9.1 MG/DL — SIGNIFICANT CHANGE UP (ref 8.5–10.1)
CHLORIDE SERPL-SCNC: 106 MMOL/L — SIGNIFICANT CHANGE UP (ref 96–108)
CO2 SERPL-SCNC: 24 MMOL/L — SIGNIFICANT CHANGE UP (ref 22–31)
CREAT SERPL-MCNC: 1.2 MG/DL — SIGNIFICANT CHANGE UP (ref 0.5–1.3)
EGFR: 69 ML/MIN/1.73M2 — SIGNIFICANT CHANGE UP
EOSINOPHIL # BLD AUTO: 0.07 K/UL — SIGNIFICANT CHANGE UP (ref 0–0.5)
EOSINOPHIL NFR BLD AUTO: 0.6 % — SIGNIFICANT CHANGE UP (ref 0–6)
FLUAV AG NPH QL: DETECTED
FLUBV AG NPH QL: SIGNIFICANT CHANGE UP
GLUCOSE BLDC GLUCOMTR-MCNC: 152 MG/DL — HIGH (ref 70–99)
GLUCOSE SERPL-MCNC: 112 MG/DL — HIGH (ref 70–99)
HCT VFR BLD CALC: 41.2 % — SIGNIFICANT CHANGE UP (ref 39–50)
HGB BLD-MCNC: 13.5 G/DL — SIGNIFICANT CHANGE UP (ref 13–17)
IMM GRANULOCYTES NFR BLD AUTO: 0.5 % — SIGNIFICANT CHANGE UP (ref 0–0.9)
INR BLD: 1.47 RATIO — HIGH (ref 0.85–1.18)
LACTATE SERPL-SCNC: 1.8 MMOL/L — SIGNIFICANT CHANGE UP (ref 0.7–2)
LYMPHOCYTES # BLD AUTO: 0.43 K/UL — LOW (ref 1–3.3)
LYMPHOCYTES # BLD AUTO: 3.4 % — LOW (ref 13–44)
MANUAL SMEAR VERIFICATION: SIGNIFICANT CHANGE UP
MCHC RBC-ENTMCNC: 29.2 PG — SIGNIFICANT CHANGE UP (ref 27–34)
MCHC RBC-ENTMCNC: 32.8 GM/DL — SIGNIFICANT CHANGE UP (ref 32–36)
MCV RBC AUTO: 89.2 FL — SIGNIFICANT CHANGE UP (ref 80–100)
MONOCYTES # BLD AUTO: 0.89 K/UL — SIGNIFICANT CHANGE UP (ref 0–0.9)
MONOCYTES NFR BLD AUTO: 7.1 % — SIGNIFICANT CHANGE UP (ref 2–14)
NEUTROPHILS # BLD AUTO: 11.09 K/UL — HIGH (ref 1.8–7.4)
NEUTROPHILS NFR BLD AUTO: 88.2 % — HIGH (ref 43–77)
PLAT MORPH BLD: NORMAL — SIGNIFICANT CHANGE UP
PLATELET # BLD AUTO: 186 K/UL — SIGNIFICANT CHANGE UP (ref 150–400)
POTASSIUM SERPL-MCNC: 3.4 MMOL/L — LOW (ref 3.5–5.3)
POTASSIUM SERPL-SCNC: 3.4 MMOL/L — LOW (ref 3.5–5.3)
PROT SERPL-MCNC: 6.3 GM/DL — SIGNIFICANT CHANGE UP (ref 6–8.3)
PROTHROM AB SERPL-ACNC: 16.4 SEC — HIGH (ref 9.5–13)
RBC # BLD: 4.62 M/UL — SIGNIFICANT CHANGE UP (ref 4.2–5.8)
RBC # FLD: 14.6 % — HIGH (ref 10.3–14.5)
RBC BLD AUTO: NORMAL — SIGNIFICANT CHANGE UP
RSV RNA NPH QL NAA+NON-PROBE: SIGNIFICANT CHANGE UP
SARS-COV-2 RNA SPEC QL NAA+PROBE: SIGNIFICANT CHANGE UP
SODIUM SERPL-SCNC: 137 MMOL/L — SIGNIFICANT CHANGE UP (ref 135–145)
TROPONIN I, HIGH SENSITIVITY RESULT: 19.87 NG/L — SIGNIFICANT CHANGE UP
WBC # BLD: 12.56 K/UL — HIGH (ref 3.8–10.5)
WBC # FLD AUTO: 12.56 K/UL — HIGH (ref 3.8–10.5)

## 2024-05-06 PROCEDURE — 99285 EMERGENCY DEPT VISIT HI MDM: CPT

## 2024-05-06 PROCEDURE — 80048 BASIC METABOLIC PNL TOTAL CA: CPT

## 2024-05-06 PROCEDURE — 83036 HEMOGLOBIN GLYCOSYLATED A1C: CPT

## 2024-05-06 PROCEDURE — 99223 1ST HOSP IP/OBS HIGH 75: CPT

## 2024-05-06 PROCEDURE — 36415 COLL VENOUS BLD VENIPUNCTURE: CPT

## 2024-05-06 PROCEDURE — 84484 ASSAY OF TROPONIN QUANT: CPT

## 2024-05-06 PROCEDURE — 83735 ASSAY OF MAGNESIUM: CPT

## 2024-05-06 PROCEDURE — 82962 GLUCOSE BLOOD TEST: CPT

## 2024-05-06 PROCEDURE — 71250 CT THORAX DX C-: CPT | Mod: 26,MC

## 2024-05-06 PROCEDURE — 85027 COMPLETE CBC AUTOMATED: CPT

## 2024-05-06 PROCEDURE — 93005 ELECTROCARDIOGRAM TRACING: CPT

## 2024-05-06 PROCEDURE — 71045 X-RAY EXAM CHEST 1 VIEW: CPT | Mod: 26

## 2024-05-06 PROCEDURE — 94640 AIRWAY INHALATION TREATMENT: CPT

## 2024-05-06 RX ORDER — ATORVASTATIN CALCIUM 80 MG/1
1 TABLET, FILM COATED ORAL
Refills: 0 | DISCHARGE

## 2024-05-06 RX ORDER — DEXTROSE 50 % IN WATER 50 %
25 SYRINGE (ML) INTRAVENOUS ONCE
Refills: 0 | Status: DISCONTINUED | OUTPATIENT
Start: 2024-05-06 | End: 2024-05-10

## 2024-05-06 RX ORDER — SODIUM CHLORIDE 9 MG/ML
1000 INJECTION, SOLUTION INTRAVENOUS
Refills: 0 | Status: DISCONTINUED | OUTPATIENT
Start: 2024-05-06 | End: 2024-05-10

## 2024-05-06 RX ORDER — BETHANECHOL CHLORIDE 25 MG
50 TABLET ORAL
Refills: 0 | Status: DISCONTINUED | OUTPATIENT
Start: 2024-05-06 | End: 2024-05-10

## 2024-05-06 RX ORDER — DEXTROSE 50 % IN WATER 50 %
12.5 SYRINGE (ML) INTRAVENOUS ONCE
Refills: 0 | Status: DISCONTINUED | OUTPATIENT
Start: 2024-05-06 | End: 2024-05-10

## 2024-05-06 RX ORDER — TAMSULOSIN HYDROCHLORIDE 0.4 MG/1
0.4 CAPSULE ORAL AT BEDTIME
Refills: 0 | Status: DISCONTINUED | OUTPATIENT
Start: 2024-05-06 | End: 2024-05-10

## 2024-05-06 RX ORDER — SODIUM CHLORIDE 9 MG/ML
1850 INJECTION INTRAMUSCULAR; INTRAVENOUS; SUBCUTANEOUS ONCE
Refills: 0 | Status: COMPLETED | OUTPATIENT
Start: 2024-05-06 | End: 2024-05-06

## 2024-05-06 RX ORDER — APIXABAN 2.5 MG/1
5 TABLET, FILM COATED ORAL EVERY 12 HOURS
Refills: 0 | Status: DISCONTINUED | OUTPATIENT
Start: 2024-05-06 | End: 2024-05-10

## 2024-05-06 RX ORDER — TAMSULOSIN HYDROCHLORIDE 0.4 MG/1
1 CAPSULE ORAL
Refills: 0 | DISCHARGE

## 2024-05-06 RX ORDER — ACETAMINOPHEN 500 MG
1000 TABLET ORAL ONCE
Refills: 0 | Status: COMPLETED | OUTPATIENT
Start: 2024-05-06 | End: 2024-05-06

## 2024-05-06 RX ORDER — IPRATROPIUM/ALBUTEROL SULFATE 18-103MCG
3 AEROSOL WITH ADAPTER (GRAM) INHALATION ONCE
Refills: 0 | Status: COMPLETED | OUTPATIENT
Start: 2024-05-06 | End: 2024-05-06

## 2024-05-06 RX ORDER — APIXABAN 2.5 MG/1
1 TABLET, FILM COATED ORAL
Refills: 0 | DISCHARGE

## 2024-05-06 RX ORDER — GABAPENTIN 400 MG/1
1 CAPSULE ORAL
Refills: 0 | DISCHARGE

## 2024-05-06 RX ORDER — SACUBITRIL AND VALSARTAN 24; 26 MG/1; MG/1
1 TABLET, FILM COATED ORAL
Refills: 0 | Status: DISCONTINUED | OUTPATIENT
Start: 2024-05-06 | End: 2024-05-10

## 2024-05-06 RX ORDER — BETHANECHOL CHLORIDE 25 MG
1 TABLET ORAL
Refills: 0 | DISCHARGE

## 2024-05-06 RX ORDER — DEXTROSE 50 % IN WATER 50 %
15 SYRINGE (ML) INTRAVENOUS ONCE
Refills: 0 | Status: DISCONTINUED | OUTPATIENT
Start: 2024-05-06 | End: 2024-05-10

## 2024-05-06 RX ORDER — INSULIN LISPRO 100/ML
VIAL (ML) SUBCUTANEOUS
Refills: 0 | Status: DISCONTINUED | OUTPATIENT
Start: 2024-05-06 | End: 2024-05-10

## 2024-05-06 RX ORDER — SPIRONOLACTONE 25 MG/1
25 TABLET, FILM COATED ORAL DAILY
Refills: 0 | Status: DISCONTINUED | OUTPATIENT
Start: 2024-05-06 | End: 2024-05-10

## 2024-05-06 RX ORDER — FLUTICASONE PROPIONATE AND SALMETEROL 50; 250 UG/1; UG/1
1 POWDER ORAL; RESPIRATORY (INHALATION)
Refills: 0 | DISCHARGE

## 2024-05-06 RX ORDER — ALBUTEROL 90 UG/1
2 AEROSOL, METERED ORAL EVERY 6 HOURS
Refills: 0 | Status: DISCONTINUED | OUTPATIENT
Start: 2024-05-06 | End: 2024-05-10

## 2024-05-06 RX ORDER — DILTIAZEM HCL 120 MG
240 CAPSULE, EXT RELEASE 24 HR ORAL DAILY
Refills: 0 | Status: DISCONTINUED | OUTPATIENT
Start: 2024-05-06 | End: 2024-05-07

## 2024-05-06 RX ORDER — BUDESONIDE AND FORMOTEROL FUMARATE DIHYDRATE 160; 4.5 UG/1; UG/1
2 AEROSOL RESPIRATORY (INHALATION)
Refills: 0 | Status: DISCONTINUED | OUTPATIENT
Start: 2024-05-06 | End: 2024-05-10

## 2024-05-06 RX ORDER — POTASSIUM CHLORIDE 20 MEQ
40 PACKET (EA) ORAL EVERY 4 HOURS
Refills: 0 | Status: COMPLETED | OUTPATIENT
Start: 2024-05-06 | End: 2024-05-06

## 2024-05-06 RX ORDER — GLUCAGON INJECTION, SOLUTION 0.5 MG/.1ML
1 INJECTION, SOLUTION SUBCUTANEOUS ONCE
Refills: 0 | Status: DISCONTINUED | OUTPATIENT
Start: 2024-05-06 | End: 2024-05-10

## 2024-05-06 RX ORDER — DEXTROSE 10 % IN WATER 10 %
125 INTRAVENOUS SOLUTION INTRAVENOUS ONCE
Refills: 0 | Status: DISCONTINUED | OUTPATIENT
Start: 2024-05-06 | End: 2024-05-10

## 2024-05-06 RX ORDER — METFORMIN HYDROCHLORIDE 850 MG/1
1 TABLET ORAL
Refills: 0 | DISCHARGE

## 2024-05-06 RX ORDER — DILTIAZEM HCL 120 MG
1 CAPSULE, EXT RELEASE 24 HR ORAL
Refills: 0 | DISCHARGE

## 2024-05-06 RX ORDER — METOPROLOL TARTRATE 50 MG
50 TABLET ORAL ONCE
Refills: 0 | Status: COMPLETED | OUTPATIENT
Start: 2024-05-06 | End: 2024-05-06

## 2024-05-06 RX ORDER — DILTIAZEM HCL 120 MG
10 CAPSULE, EXT RELEASE 24 HR ORAL ONCE
Refills: 0 | Status: COMPLETED | OUTPATIENT
Start: 2024-05-06 | End: 2024-05-06

## 2024-05-06 RX ORDER — SACUBITRIL AND VALSARTAN 24; 26 MG/1; MG/1
1 TABLET, FILM COATED ORAL
Refills: 0 | DISCHARGE

## 2024-05-06 RX ORDER — ONDANSETRON 8 MG/1
4 TABLET, FILM COATED ORAL EVERY 6 HOURS
Refills: 0 | Status: DISCONTINUED | OUTPATIENT
Start: 2024-05-06 | End: 2024-05-10

## 2024-05-06 RX ORDER — ACETAMINOPHEN 500 MG
650 TABLET ORAL EVERY 6 HOURS
Refills: 0 | Status: DISCONTINUED | OUTPATIENT
Start: 2024-05-06 | End: 2024-05-10

## 2024-05-06 RX ORDER — SPIRONOLACTONE 25 MG/1
1 TABLET, FILM COATED ORAL
Refills: 0 | DISCHARGE

## 2024-05-06 RX ADMIN — Medication 75 MILLIGRAM(S): at 15:19

## 2024-05-06 RX ADMIN — Medication 1000 MILLIGRAM(S): at 10:05

## 2024-05-06 RX ADMIN — Medication 125 MILLIGRAM(S): at 13:37

## 2024-05-06 RX ADMIN — Medication 3 MILLILITER(S): at 13:37

## 2024-05-06 RX ADMIN — Medication 40 MILLIEQUIVALENT(S): at 18:11

## 2024-05-06 RX ADMIN — Medication 2: at 18:11

## 2024-05-06 RX ADMIN — SODIUM CHLORIDE 1850 MILLILITER(S): 9 INJECTION INTRAMUSCULAR; INTRAVENOUS; SUBCUTANEOUS at 09:39

## 2024-05-06 RX ADMIN — Medication 400 MILLIGRAM(S): at 09:48

## 2024-05-06 RX ADMIN — Medication 50 MILLIGRAM(S): at 09:48

## 2024-05-06 NOTE — ED ADULT NURSE REASSESSMENT NOTE - NS ED NURSE REASSESS COMMENT FT1
Assumed care of patient, AxOx4, Syriac speaking only,  iPad used. Patient denies pain at present, VS stable, cardiac monitoring maintained. HR fluctuating from 80's-130's a-fib, not sustaining. Frequent nonproductive cough observed, afebrile. Shortness of breath on exertion persists. Oxygen saturation 95% or greater on room air. Patient and son, Khadar, updated on plan of care, all questions and concerns addressed, stretcher locked and in lowest position, call bell within reach. Will continue to monitor.

## 2024-05-06 NOTE — PATIENT PROFILE ADULT - FALL HARM RISK - HARM RISK INTERVENTIONS
Communicate Risk of Fall with Harm to all staff/Reinforce activity limits and safety measures with patient and family/Tailored Fall Risk Interventions/Visual Cue: Yellow wristband and red socks/Other:/Bed in lowest position, wheels locked, appropriate side rails in place/Call bell, personal items and telephone in reach/Instruct patient to call for assistance before getting out of bed or chair/Non-slip footwear when patient is out of bed/[place] to call system/Physically safe environment - no spills, clutter or unnecessary equipment/Purposeful Proactive Rounding/Room/bathroom lighting operational, light cord in reach

## 2024-05-06 NOTE — ED ADULT NURSE REASSESSMENT NOTE - NS ED NURSE REASSESS COMMENT FT1
Purposeful proactive rounding completed at this time, all needs addressed, no change from previous assessment. VS stable. Cardiac monitoring maintained. Patient remains in a-fib with occasional short bursts to the 140's, not sustaining. Voiding per urinal. Afebrile, resting comfortably in stretcher. Awaiting bed placement. Patient updated on admission status and plan of care. All questions addressed and answered. Comfort and safety measures maintained. Will continue to monitor.

## 2024-05-06 NOTE — ED PROVIDER NOTE - WR ORDER STATUS 1
[de-identified] : Constitutional: Well-developed, in no acute distress  Psychiatric: Appropriate mood and affect, oriented to time, place, person, and situation  Resulted

## 2024-05-06 NOTE — ED PROVIDER NOTE - CLINICAL SUMMARY MEDICAL DECISION MAKING FREE TEXT BOX
Plan: Septic workup. Reviewed pt's echo from 2022 prior to administering fluids. Will give 30CC per kilo, IV Acetaminophen, beta blocker.

## 2024-05-06 NOTE — ED PROVIDER NOTE - PHYSICAL EXAMINATION
CONSTITUTIONAL: Well appearing, awake, alert, oriented to person, place, time/situation and in no apparent distress.  · ENMT: Airway patent, Nasal mucosa clear. Mouth with normal mucosa. Throat has no vesicles, no oropharyngeal exudates and uvula is midline.  · EYES: Clear bilaterally, pupils equal, round and reactive to light.  · CARDIAC: tachycardic, irregularly irregular Heart sounds S1, S2.  No murmurs, rubs or gallops.  · RESPIRATORY: diminished breath sounds diffusely  · GASTROINTESTINAL: Abdomen soft, non-tender, no guarding.  · MUSCULOSKELETAL: Spine appears normal, range of motion is not limited, no muscle or joint tenderness  · NEUROLOGICAL: Alert and oriented, no focal deficits, no motor or sensory deficits.  · SKIN: Skin normal color for race, dry and intact. No evidence of rash. Skin hot to touch.

## 2024-05-06 NOTE — ED PROVIDER NOTE - OBJECTIVE STATEMENT
59 y/o male with a PMHx of Afib on Eliquis, asthma, DM, HTN, HLD presents to the ED c/o cough, fevers, chills, fatigue, SOB and right sided CP when coughing and breathing deeply x3 to 4 days. Symptoms progressively worsening. Pt travelled to [facility] 1 month ago. Denies LE swelling. No known sick contacts. No other complaints at this time.

## 2024-05-06 NOTE — H&P ADULT - HISTORY OF PRESENT ILLNESS
· 57 y/o male with a PMHx of Afib on Eliquis, Asthma, DM, HTN, HLD presents to the ED c/o cough, fevers, chills, fatigue, SOB and right sided CP when coughing and breathing deeply x3 to 4 days. Symptoms progressively worsening. Pt travelled to [facility] 1 month ago. Denies LE swelling. No known sick contacts. No other complaints at this time.  -found to have rapid AFib     PAST MEDICAL HISTORY:  A-fib   Asthma   Diabetes   High cholesterol   HTN (hypertension).     PAST SURGICAL HISTORY:  H/O colonoscopy.    Social Hx:   no smoking, no Etoh    Family Hx:   mother CVA         REVIEW OF SYSTEMS:    CONSTITUTIONAL: No weakness, No fevers or chills  ENT: No ear ache, No sorethroat  NECK: No pain, No stiffness  RESPIRATORY: + cough, + wheezing, No hemoptysis; + dyspnea  CARDIOVASCULAR: No chest pain, No palpitations  GASTROINTESTINAL: No abd pain, No nausea, No vomiting, No hematemesis, No diarrhea or constipation. No melena, No hematochezia.  GENITOURINARY: No dysuria, No  hematuria  NEUROLOGICAL: No diplopia, No paresthesia, No motor dysfunction  MUSCULOSKELETAL: No arthralgia, No myalgia  SKIN: No rashes, or lesions   PSYCH: no anxiety, no suicidal ideation    All other review of systems is negative unless indicated above    Vital Signs Last 24 Hrs  T(C): 37.7 (06 May 2024 11:27), Max: 38.4 (06 May 2024 08:35)  T(F): 99.8 (06 May 2024 11:27), Max: 101.2 (06 May 2024 08:35)  HR: 99 (06 May 2024 15:30) (79 - 122)  BP: 135/90 (06 May 2024 15:30) (94/63 - 135/90)  BP(mean): 73 (06 May 2024 11:27) (73 - 82)  RR: 18 (06 May 2024 15:30) (16 - 18)  SpO2: 96% (06 May 2024 15:30) (96% - 100%)    Parameters below as of 06 May 2024 15:30  Patient On (Oxygen Delivery Method): room air        PHYSICAL EXAM:    GENERAL: NAD  HEENT:  NC/AT, EOMI, PERRLA, No scleral icterus, Moist mucous membranes  NECK: Supple, No JVD  CNS:  Alert & Oriented X3, Motor Strength 5/5 B/L upper and lower extremities; DTRs 2+ intact   LUNG: Normal Breath sounds, Clear to auscultation bilaterally, + rales, No rhonchi, + wheezing  HEART: RRR; No murmurs, No rubs  ABDOMEN: +BS, ST/ND/NT  GENITOURINARY: Voiding, Bladder not distended  EXTREMITIES:  2+ Peripheral Pulses, No clubbing, No cyanosis, No tibial edema  MUSCULOSKELTAL: Joints normal ROM, No TTP, No effusion  SKIN: no rashes  RECTAL: deferred, not indicated  BREAST: deferred                          13.5   12.56 )-----------( 186      ( 06 May 2024 09:20 )             41.2     05-06    137  |  106  |  15  ----------------------------<  112<H>  3.4<L>   |  24  |  1.20    Ca    9.1      06 May 2024 09:20    TPro  6.3  /  Alb  3.7  /  TBili  1.8<H>  /  DBili  x   /  AST  36  /  ALT  62  /  AlkPhos  74  05-06    Vancomycin levels:   Cultures:     MEDICATIONS  (STANDING):  apixaban 5 milliGRAM(s) Oral every 12 hours  bethanechol 50 milliGRAM(s) Oral two times a day  budesonide 160 MICROgram(s)/formoterol 4.5 MICROgram(s) Inhaler 2 Puff(s) Inhalation two times a day  diltiazem    milliGRAM(s) Oral daily  glucagon  Injectable 1 milliGRAM(s) IntraMuscular once  insulin lispro (ADMELOG) corrective regimen sliding scale   SubCutaneous three times a day before meals  methylPREDNISolone sodium succinate Injectable 40 milliGRAM(s) IV Push two times a day  oseltamivir 75 milliGRAM(s) Oral two times a day  potassium chloride    Tablet ER 40 milliEquivalent(s) Oral every 4 hours  sacubitril 24 mG/valsartan 26 mG 1 Tablet(s) Oral two times a day  spironolactone 25 milliGRAM(s) Oral daily  tamsulosin 0.4 milliGRAM(s) Oral at bedtime    MEDICATIONS  (PRN):  acetaminophen     Tablet .. 650 milliGRAM(s) Oral every 6 hours PRN Mild Pain (1 - 3)  albuterol    90 MICROgram(s) HFA Inhaler 2 Puff(s) Inhalation every 6 hours PRN Shortness of Breath and/or Wheezing  dextrose Oral Gel 15 Gram(s) Oral once PRN Blood Glucose LESS THAN 70 milliGRAM(s)/deciliter  ondansetron Injectable 4 milliGRAM(s) IV Push every 6 hours PRN Nausea and/or Vomiting      all labs reviewed  all imaging reviewed    a/p:    1. Asthma exacerbation due to Influenza A URI:  Solumedrol IV , Albuterol , ICS  Tamiflu     2. Rapid Afib:  Admit to telemetry  Trops x 3  c/w Cardize   Cardiology evaluation   Replenish K

## 2024-05-06 NOTE — PATIENT PROFILE ADULT - NSPROPTRIGHTREPPHONE_GEN_A_NUR
Can you check to see if his eyemed plan has medically necessary cls and then let me know    thanks   [phone] (Charline); [phone] (Khadar)

## 2024-05-06 NOTE — ED ADULT NURSE NOTE - OBJECTIVE STATEMENT
Ambulatory to ER with c/o chest pain, cough, and fever x 1 day. Patient a & ox4, respirations even and unlabored on room air. Await MD bae.

## 2024-05-07 LAB
A1C WITH ESTIMATED AVERAGE GLUCOSE RESULT: 5.9 % — HIGH (ref 4–5.6)
ANION GAP SERPL CALC-SCNC: 6 MMOL/L — SIGNIFICANT CHANGE UP (ref 5–17)
BUN SERPL-MCNC: 23 MG/DL — SIGNIFICANT CHANGE UP (ref 7–23)
CALCIUM SERPL-MCNC: 8.8 MG/DL — SIGNIFICANT CHANGE UP (ref 8.5–10.1)
CHLORIDE SERPL-SCNC: 110 MMOL/L — HIGH (ref 96–108)
CO2 SERPL-SCNC: 19 MMOL/L — LOW (ref 22–31)
CREAT SERPL-MCNC: 1.12 MG/DL — SIGNIFICANT CHANGE UP (ref 0.5–1.3)
CULTURE RESULTS: NO GROWTH — SIGNIFICANT CHANGE UP
EGFR: 75 ML/MIN/1.73M2 — SIGNIFICANT CHANGE UP
ESTIMATED AVERAGE GLUCOSE: 123 MG/DL — HIGH (ref 68–114)
GLUCOSE BLDC GLUCOMTR-MCNC: 144 MG/DL — HIGH (ref 70–99)
GLUCOSE BLDC GLUCOMTR-MCNC: 172 MG/DL — HIGH (ref 70–99)
GLUCOSE BLDC GLUCOMTR-MCNC: 217 MG/DL — HIGH (ref 70–99)
GLUCOSE SERPL-MCNC: 184 MG/DL — HIGH (ref 70–99)
POTASSIUM SERPL-MCNC: 4.7 MMOL/L — SIGNIFICANT CHANGE UP (ref 3.5–5.3)
POTASSIUM SERPL-SCNC: 4.7 MMOL/L — SIGNIFICANT CHANGE UP (ref 3.5–5.3)
SODIUM SERPL-SCNC: 135 MMOL/L — SIGNIFICANT CHANGE UP (ref 135–145)
SPECIMEN SOURCE: SIGNIFICANT CHANGE UP
TROPONIN I, HIGH SENSITIVITY RESULT: 16.43 NG/L — SIGNIFICANT CHANGE UP

## 2024-05-07 PROCEDURE — 99233 SBSQ HOSP IP/OBS HIGH 50: CPT

## 2024-05-07 RX ORDER — DILTIAZEM HCL 120 MG
10 CAPSULE, EXT RELEASE 24 HR ORAL ONCE
Refills: 0 | Status: COMPLETED | OUTPATIENT
Start: 2024-05-07 | End: 2024-05-07

## 2024-05-07 RX ORDER — IPRATROPIUM/ALBUTEROL SULFATE 18-103MCG
3 AEROSOL WITH ADAPTER (GRAM) INHALATION EVERY 6 HOURS
Refills: 0 | Status: DISCONTINUED | OUTPATIENT
Start: 2024-05-07 | End: 2024-05-10

## 2024-05-07 RX ORDER — DILTIAZEM HCL 120 MG
5 CAPSULE, EXT RELEASE 24 HR ORAL
Qty: 125 | Refills: 0 | Status: DISCONTINUED | OUTPATIENT
Start: 2024-05-07 | End: 2024-05-09

## 2024-05-07 RX ORDER — METOPROLOL TARTRATE 50 MG
25 TABLET ORAL
Refills: 0 | Status: DISCONTINUED | OUTPATIENT
Start: 2024-05-07 | End: 2024-05-07

## 2024-05-07 RX ORDER — METOPROLOL TARTRATE 50 MG
5 TABLET ORAL EVERY 6 HOURS
Refills: 0 | Status: DISCONTINUED | OUTPATIENT
Start: 2024-05-07 | End: 2024-05-07

## 2024-05-07 RX ORDER — CHLORHEXIDINE GLUCONATE 213 G/1000ML
1 SOLUTION TOPICAL
Refills: 0 | Status: DISCONTINUED | OUTPATIENT
Start: 2024-05-07 | End: 2024-05-10

## 2024-05-07 RX ORDER — DILTIAZEM HCL 120 MG
300 CAPSULE, EXT RELEASE 24 HR ORAL DAILY
Refills: 0 | Status: DISCONTINUED | OUTPATIENT
Start: 2024-05-08 | End: 2024-05-09

## 2024-05-07 RX ADMIN — Medication 50 MILLIGRAM(S): at 00:24

## 2024-05-07 RX ADMIN — Medication 40 MILLIGRAM(S): at 00:23

## 2024-05-07 RX ADMIN — Medication 3 MILLILITER(S): at 21:02

## 2024-05-07 RX ADMIN — APIXABAN 5 MILLIGRAM(S): 2.5 TABLET, FILM COATED ORAL at 11:43

## 2024-05-07 RX ADMIN — Medication 40 MILLIEQUIVALENT(S): at 00:24

## 2024-05-07 RX ADMIN — BUDESONIDE AND FORMOTEROL FUMARATE DIHYDRATE 2 PUFF(S): 160; 4.5 AEROSOL RESPIRATORY (INHALATION) at 21:03

## 2024-05-07 RX ADMIN — Medication 75 MILLIGRAM(S): at 00:25

## 2024-05-07 RX ADMIN — Medication 10 MILLIGRAM(S): at 00:17

## 2024-05-07 RX ADMIN — APIXABAN 5 MILLIGRAM(S): 2.5 TABLET, FILM COATED ORAL at 23:07

## 2024-05-07 RX ADMIN — Medication 1200 MILLIGRAM(S): at 23:07

## 2024-05-07 RX ADMIN — Medication 1200 MILLIGRAM(S): at 00:24

## 2024-05-07 RX ADMIN — Medication 4: at 12:46

## 2024-05-07 RX ADMIN — SACUBITRIL AND VALSARTAN 1 TABLET(S): 24; 26 TABLET, FILM COATED ORAL at 02:55

## 2024-05-07 RX ADMIN — Medication 5 MILLIGRAM(S): at 11:41

## 2024-05-07 RX ADMIN — SPIRONOLACTONE 25 MILLIGRAM(S): 25 TABLET, FILM COATED ORAL at 09:49

## 2024-05-07 RX ADMIN — Medication 50 MILLIGRAM(S): at 10:27

## 2024-05-07 RX ADMIN — Medication 50 MILLIGRAM(S): at 23:08

## 2024-05-07 RX ADMIN — SACUBITRIL AND VALSARTAN 1 TABLET(S): 24; 26 TABLET, FILM COATED ORAL at 23:08

## 2024-05-07 RX ADMIN — Medication 75 MILLIGRAM(S): at 11:44

## 2024-05-07 RX ADMIN — Medication 3 MILLILITER(S): at 16:06

## 2024-05-07 RX ADMIN — Medication 40 MILLIGRAM(S): at 09:55

## 2024-05-07 RX ADMIN — TAMSULOSIN HYDROCHLORIDE 0.4 MILLIGRAM(S): 0.4 CAPSULE ORAL at 23:08

## 2024-05-07 RX ADMIN — APIXABAN 5 MILLIGRAM(S): 2.5 TABLET, FILM COATED ORAL at 00:25

## 2024-05-07 RX ADMIN — Medication 25 MILLIGRAM(S): at 09:49

## 2024-05-07 RX ADMIN — SACUBITRIL AND VALSARTAN 1 TABLET(S): 24; 26 TABLET, FILM COATED ORAL at 12:48

## 2024-05-07 RX ADMIN — Medication 240 MILLIGRAM(S): at 10:25

## 2024-05-07 RX ADMIN — Medication 1200 MILLIGRAM(S): at 09:50

## 2024-05-07 RX ADMIN — Medication 5 MG/HR: at 14:48

## 2024-05-07 RX ADMIN — Medication 75 MILLIGRAM(S): at 23:07

## 2024-05-07 RX ADMIN — Medication 40 MILLIGRAM(S): at 23:07

## 2024-05-07 RX ADMIN — Medication 10 MILLIGRAM(S): at 05:04

## 2024-05-07 RX ADMIN — TAMSULOSIN HYDROCHLORIDE 0.4 MILLIGRAM(S): 0.4 CAPSULE ORAL at 00:31

## 2024-05-07 NOTE — ED ADULT NURSE REASSESSMENT NOTE - NS ED NURSE REASSESS COMMENT FT1
Pt received from WHITLEY Laird 02:30. Pt on droplet precautions. Pt offers no complaints at this time. Safety and comfort measures maintained.

## 2024-05-07 NOTE — ED ADULT NURSE REASSESSMENT NOTE - NS ED NURSE REASSESS COMMENT FT1
MD Chacon made aware of patient' HR in the 120's-140's. All other VS stable. New orders received and carried out. Cardiac monitoring maintained,  phone utilized to update patient on plan of care.  ID: [id number]  Name: Octavio. Stretcher locked and in lowest position, patient voiding per urinal, commode at bedside, BM x 1. comfort and safety measures maintained, will continue to monitor.

## 2024-05-08 LAB
ANION GAP SERPL CALC-SCNC: 7 MMOL/L — SIGNIFICANT CHANGE UP (ref 5–17)
BUN SERPL-MCNC: 26 MG/DL — HIGH (ref 7–23)
CALCIUM SERPL-MCNC: 9.2 MG/DL — SIGNIFICANT CHANGE UP (ref 8.5–10.1)
CHLORIDE SERPL-SCNC: 113 MMOL/L — HIGH (ref 96–108)
CO2 SERPL-SCNC: 21 MMOL/L — LOW (ref 22–31)
CREAT SERPL-MCNC: 1.1 MG/DL — SIGNIFICANT CHANGE UP (ref 0.5–1.3)
EGFR: 77 ML/MIN/1.73M2 — SIGNIFICANT CHANGE UP
GLUCOSE BLDC GLUCOMTR-MCNC: 152 MG/DL — HIGH (ref 70–99)
GLUCOSE BLDC GLUCOMTR-MCNC: 161 MG/DL — HIGH (ref 70–99)
GLUCOSE BLDC GLUCOMTR-MCNC: 179 MG/DL — HIGH (ref 70–99)
GLUCOSE BLDC GLUCOMTR-MCNC: 216 MG/DL — HIGH (ref 70–99)
GLUCOSE SERPL-MCNC: 171 MG/DL — HIGH (ref 70–99)
HCT VFR BLD CALC: 41.1 % — SIGNIFICANT CHANGE UP (ref 39–50)
HGB BLD-MCNC: 13.2 G/DL — SIGNIFICANT CHANGE UP (ref 13–17)
MCHC RBC-ENTMCNC: 28.6 PG — SIGNIFICANT CHANGE UP (ref 27–34)
MCHC RBC-ENTMCNC: 32.1 GM/DL — SIGNIFICANT CHANGE UP (ref 32–36)
MCV RBC AUTO: 89 FL — SIGNIFICANT CHANGE UP (ref 80–100)
PLATELET # BLD AUTO: 222 K/UL — SIGNIFICANT CHANGE UP (ref 150–400)
POTASSIUM SERPL-MCNC: 4.2 MMOL/L — SIGNIFICANT CHANGE UP (ref 3.5–5.3)
POTASSIUM SERPL-SCNC: 4.2 MMOL/L — SIGNIFICANT CHANGE UP (ref 3.5–5.3)
RBC # BLD: 4.62 M/UL — SIGNIFICANT CHANGE UP (ref 4.2–5.8)
RBC # FLD: 15 % — HIGH (ref 10.3–14.5)
SODIUM SERPL-SCNC: 141 MMOL/L — SIGNIFICANT CHANGE UP (ref 135–145)
WBC # BLD: 23.53 K/UL — HIGH (ref 3.8–10.5)
WBC # FLD AUTO: 23.53 K/UL — HIGH (ref 3.8–10.5)

## 2024-05-08 PROCEDURE — 99233 SBSQ HOSP IP/OBS HIGH 50: CPT

## 2024-05-08 RX ADMIN — SPIRONOLACTONE 25 MILLIGRAM(S): 25 TABLET, FILM COATED ORAL at 10:16

## 2024-05-08 RX ADMIN — Medication 40 MILLIGRAM(S): at 10:15

## 2024-05-08 RX ADMIN — Medication 5 MG/HR: at 05:50

## 2024-05-08 RX ADMIN — SACUBITRIL AND VALSARTAN 1 TABLET(S): 24; 26 TABLET, FILM COATED ORAL at 22:19

## 2024-05-08 RX ADMIN — Medication 75 MILLIGRAM(S): at 22:19

## 2024-05-08 RX ADMIN — Medication 3 MILLILITER(S): at 09:26

## 2024-05-08 RX ADMIN — BUDESONIDE AND FORMOTEROL FUMARATE DIHYDRATE 2 PUFF(S): 160; 4.5 AEROSOL RESPIRATORY (INHALATION) at 09:31

## 2024-05-08 RX ADMIN — Medication 3 MILLILITER(S): at 14:56

## 2024-05-08 RX ADMIN — BUDESONIDE AND FORMOTEROL FUMARATE DIHYDRATE 2 PUFF(S): 160; 4.5 AEROSOL RESPIRATORY (INHALATION) at 21:06

## 2024-05-08 RX ADMIN — Medication 40 MILLIGRAM(S): at 22:19

## 2024-05-08 RX ADMIN — Medication 75 MILLIGRAM(S): at 10:16

## 2024-05-08 RX ADMIN — Medication 50 MILLIGRAM(S): at 10:16

## 2024-05-08 RX ADMIN — Medication 1200 MILLIGRAM(S): at 22:19

## 2024-05-08 RX ADMIN — Medication 3 MILLILITER(S): at 21:06

## 2024-05-08 RX ADMIN — Medication 50 MILLIGRAM(S): at 22:19

## 2024-05-08 RX ADMIN — Medication 300 MILLIGRAM(S): at 10:16

## 2024-05-08 RX ADMIN — APIXABAN 5 MILLIGRAM(S): 2.5 TABLET, FILM COATED ORAL at 10:16

## 2024-05-08 RX ADMIN — SACUBITRIL AND VALSARTAN 1 TABLET(S): 24; 26 TABLET, FILM COATED ORAL at 10:16

## 2024-05-08 RX ADMIN — Medication 2: at 17:53

## 2024-05-08 RX ADMIN — APIXABAN 5 MILLIGRAM(S): 2.5 TABLET, FILM COATED ORAL at 22:19

## 2024-05-08 RX ADMIN — TAMSULOSIN HYDROCHLORIDE 0.4 MILLIGRAM(S): 0.4 CAPSULE ORAL at 22:18

## 2024-05-08 RX ADMIN — Medication 1200 MILLIGRAM(S): at 10:16

## 2024-05-08 NOTE — CONSULT NOTE ADULT - SUBJECTIVE AND OBJECTIVE BOX
HPI:  · 59 y/o male with a PMHx of Afib on Eliquis, Asthma, DM, HTN, HLD presents to the ED c/o cough, fevers, chills, fatigue, SOB and right sided CP when coughing and breathing deeply x3 to 4 days. Symptoms progressively worsening. Pt travelled to [facility] 1 month ago. Denies LE swelling. No known sick contacts. No other complaints at this time.  -found to have rapid AFib     PAST MEDICAL HISTORY:  A-fib   Asthma   Diabetes   High cholesterol   HTN (hypertension).     PAST SURGICAL HISTORY:  H/O colonoscopy.    Social Hx:   no smoking, no Etoh    Family Hx:   mother CVA         REVIEW OF SYSTEMS:    CONSTITUTIONAL: No weakness, No fevers or chills  ENT: No ear ache, No sorethroat  NECK: No pain, No stiffness  RESPIRATORY: + cough, + wheezing, No hemoptysis; + dyspnea  CARDIOVASCULAR: No chest pain, No palpitations  GASTROINTESTINAL: No abd pain, No nausea, No vomiting, No hematemesis, No diarrhea or constipation. No melena, No hematochezia.  GENITOURINARY: No dysuria, No  hematuria  NEUROLOGICAL: No diplopia, No paresthesia, No motor dysfunction  MUSCULOSKELETAL: No arthralgia, No myalgia  SKIN: No rashes, or lesions   PSYCH: no anxiety, no suicidal ideation    All other review of systems is negative unless indicated above    Vital Signs Last 24 Hrs  T(C): 37.7 (06 May 2024 11:27), Max: 38.4 (06 May 2024 08:35)  T(F): 99.8 (06 May 2024 11:27), Max: 101.2 (06 May 2024 08:35)  HR: 99 (06 May 2024 15:30) (79 - 122)  BP: 135/90 (06 May 2024 15:30) (94/63 - 135/90)  BP(mean): 73 (06 May 2024 11:27) (73 - 82)  RR: 18 (06 May 2024 15:30) (16 - 18)  SpO2: 96% (06 May 2024 15:30) (96% - 100%)    Parameters below as of 06 May 2024 15:30  Patient On (Oxygen Delivery Method): room air        PHYSICAL EXAM:    GENERAL: NAD  HEENT:  NC/AT, EOMI, PERRLA, No scleral icterus, Moist mucous membranes  NECK: Supple, No JVD  CNS:  Alert & Oriented X3, Motor Strength 5/5 B/L upper and lower extremities; DTRs 2+ intact   LUNG: Normal Breath sounds, Clear to auscultation bilaterally, + rales, No rhonchi, + wheezing  HEART: RRR; No murmurs, No rubs  ABDOMEN: +BS, ST/ND/NT  GENITOURINARY: Voiding, Bladder not distended  EXTREMITIES:  2+ Peripheral Pulses, No clubbing, No cyanosis, No tibial edema  MUSCULOSKELTAL: Joints normal ROM, No TTP, No effusion  SKIN: no rashes  RECTAL: deferred, not indicated  BREAST: deferred                          13.5   12.56 )-----------( 186      ( 06 May 2024 09:20 )             41.2     05-06    137  |  106  |  15  ----------------------------<  112<H>  3.4<L>   |  24  |  1.20    Ca    9.1      06 May 2024 09:20    TPro  6.3  /  Alb  3.7  /  TBili  1.8<H>  /  DBili  x   /  AST  36  /  ALT  62  /  AlkPhos  74  05-06    Vancomycin levels:   Cultures:     MEDICATIONS  (STANDING):  apixaban 5 milliGRAM(s) Oral every 12 hours  bethanechol 50 milliGRAM(s) Oral two times a day  budesonide 160 MICROgram(s)/formoterol 4.5 MICROgram(s) Inhaler 2 Puff(s) Inhalation two times a day  diltiazem    milliGRAM(s) Oral daily  glucagon  Injectable 1 milliGRAM(s) IntraMuscular once  insulin lispro (ADMELOG) corrective regimen sliding scale   SubCutaneous three times a day before meals  methylPREDNISolone sodium succinate Injectable 40 milliGRAM(s) IV Push two times a day  oseltamivir 75 milliGRAM(s) Oral two times a day  potassium chloride    Tablet ER 40 milliEquivalent(s) Oral every 4 hours  sacubitril 24 mG/valsartan 26 mG 1 Tablet(s) Oral two times a day  spironolactone 25 milliGRAM(s) Oral daily  tamsulosin 0.4 milliGRAM(s) Oral at bedtime    MEDICATIONS  (PRN):  acetaminophen     Tablet .. 650 milliGRAM(s) Oral every 6 hours PRN Mild Pain (1 - 3)  albuterol    90 MICROgram(s) HFA Inhaler 2 Puff(s) Inhalation every 6 hours PRN Shortness of Breath and/or Wheezing  dextrose Oral Gel 15 Gram(s) Oral once PRN Blood Glucose LESS THAN 70 milliGRAM(s)/deciliter  ondansetron Injectable 4 milliGRAM(s) IV Push every 6 hours PRN Nausea and/or Vomiting      all labs reviewed  all imaging reviewed    a/p:    1. Asthma exacerbation due to Influenza A URI:  Solumedrol IV , Albuterol , ICS  Tamiflu     2. Rapid Afib:  Admit to telemetry  [facility] x 3  c/w [facility]   Cardiology evaluation   Jimi REAGAN (06 May 2024 16:07)      PAST MEDICAL & SURGICAL HISTORY:  HTN (hypertension)      High cholesterol      Asthma      A-fib      Diabetes      H/O colonoscopy          Home Medications:  Advair Diskus 250 mcg-50 mcg inhalation powder: 1 inhaled 2 times a day (06 May 2024 15:00)  atorvastatin 40 mg oral tablet: 1 tab(s) orally once a day (06 May 2024 15:00)  bethanechol 50 mg oral tablet: 1 tab(s) orally 2 times a day (06 May 2024 15:00)  DilTIAZem ([facility]-Cardizem CD) 240 mg/24 hours oral capsule, extended release: 1 cap(s) orally once a day (06 May 2024 15:00)  Eliquis 5 mg oral tablet: 1 tab(s) orally 2 times a day (06 May 2024 15:00)  metFORMIN 500 mg oral tablet: 1 tab(s) orally once a day (06 May 2024 15:00)  sacubitril-valsartan 24 mg-26 mg oral tablet: 1 tab(s) orally 2 times a day (06 May 2024 15:00)  spironolactone 25 mg oral tablet: 1 tab(s) orally once a day (06 May 2024 15:00)  tamsulosin 0.4 mg oral capsule: 1 cap(s) orally once a day (06 May 2024 15:00)      MEDICATIONS  (STANDING):  albuterol/ipratropium for Nebulization 3 milliLiter(s) Nebulizer every 6 hours  apixaban 5 milliGRAM(s) Oral every 12 hours  bethanechol 50 milliGRAM(s) Oral two times a day  budesonide 160 MICROgram(s)/formoterol 4.5 MICROgram(s) Inhaler 2 Puff(s) Inhalation two times a day  chlorhexidine 4% Liquid 1 Application(s) Topical <User Schedule>  dextrose 10% Bolus 125 milliLiter(s) IV Bolus once  dextrose 5%. 1000 milliLiter(s) (50 mL/Hr) IV Continuous <Continuous>  dextrose 5%. 1000 milliLiter(s) (100 mL/Hr) IV Continuous <Continuous>  dextrose 50% Injectable 12.5 Gram(s) IV Push once  dextrose 50% Injectable 25 Gram(s) IV Push once  diltiazem    milliGRAM(s) Oral daily  diltiazem Infusion 5 mG/Hr (5 mL/Hr) IV Continuous <Continuous>  glucagon  Injectable 1 milliGRAM(s) IntraMuscular once  guaiFENesin ER 1200 milliGRAM(s) Oral every 12 hours  insulin lispro (ADMELOG) corrective regimen sliding scale   SubCutaneous three times a day before meals  methylPREDNISolone sodium succinate Injectable 40 milliGRAM(s) IV Push two times a day  oseltamivir 75 milliGRAM(s) Oral two times a day  sacubitril 24 mG/valsartan 26 mG 1 Tablet(s) Oral two times a day  spironolactone 25 milliGRAM(s) Oral daily  tamsulosin 0.4 milliGRAM(s) Oral at bedtime    MEDICATIONS  (PRN):  acetaminophen     Tablet .. 650 milliGRAM(s) Oral every 6 hours PRN Mild Pain (1 - 3)  albuterol    90 MICROgram(s) HFA Inhaler 2 Puff(s) Inhalation every 6 hours PRN Shortness of Breath and/or Wheezing  dextrose Oral Gel 15 Gram(s) Oral once PRN Blood Glucose LESS THAN 70 milliGRAM(s)/deciliter  ondansetron Injectable 4 milliGRAM(s) IV Push every 6 hours PRN Nausea and/or Vomiting      Allergies    No Known Allergies    Intolerances        SOCIAL HISTORY: Denies tobacco, etoh abuse or illicit drug use    FAMILY HISTORY:      Vital Signs Last 24 Hrs  T(C): 36.6 (08 May 2024 02:05), Max: 36.9 (07 May 2024 16:51)  T(F): 97.8 (08 May 2024 02:05), Max: 98.4 (07 May 2024 16:51)  HR: 107 (08 May 2024 05:55) (65 - 107)  BP: 113/82 (08 May 2024 05:55) (103/64 - 137/84)  BP(mean): --  RR: 18 (07 May 2024 21:48) (18 - 18)  SpO2: 97% (08 May 2024 02:05) (97% - 100%)    Parameters below as of 08 May 2024 02:05  Patient On (Oxygen Delivery Method): room air            REVIEW OF SYSTEMS:    CONSTITUTIONAL:  As per HPI.  HEENT:  Eyes:  No diplopia or blurred vision. ENT:  No earache, sore throat or runny nose.  CARDIOVASCULAR:  No pressure, squeezing, tightness, heaviness or aching about the chest, neck, axilla or epigastrium.  RESPIRATORY:  No cough, shortness of breath, PND or orthopnea.  GASTROINTESTINAL:  No nausea, vomiting or diarrhea.  GENITOURINARY:  No dysuria, frequency or urgency.  MUSCULOSKELETAL:  As per HPI.  SKIN:  No change in skin, hair or nails.  NEUROLOGIC:  No paresthesias, fasciculations, seizures or weakness.  PSYCHIATRIC:  No disorder of thought or mood.  ENDOCRINE:  No heat or cold intolerance, polyuria or polydipsia.  HEMATOLOGICAL:  No easy bruising or bleedings:  .     PHYSICAL EXAMINATION:    GENERAL APPEARANCE:  Pt. is not currently dyspneic, in no distress. Pt. is alert, oriented, and pleasant.  HEENT:  Pupils are normal and react normally. No icterus. Mucous membranes well colored.  NECK:  Supple. No lymphadenopathy. Jugular venous pressure not elevated. Carotids equal.   HEART:   The cardiac impulse has a normal quality. Regular. Normal S1 and S2. There are no murmurs, rubs or gallops noted  CHEST:  Chest is clear to auscultation. Normal respiratory effort.  ABDOMEN:  Soft and nontender.   EXTREMITIES:  There is no cyanosis, clubbing or edema.   SKIN:  No rash or significant lesions are noted.    LABS:                        13.2   23.53 )-----------( 222      ( 08 May 2024 08:08 )             41.1     05-08    141  |  113<H>  |  26<H>  ----------------------------<  171<H>  4.2   |  21<L>  |  1.10    Ca    9.2      08 May 2024 08:08    TPro  6.3  /  Alb  3.7  /  TBili  1.8<H>  /  DBili  x   /  AST  36  /  ALT  62  /  AlkPhos  74  05-06    LIVER FUNCTIONS - ( 06 May 2024 09:20 )  Alb: 3.7 g/dL / Pro: 6.3 gm/dL / ALK PHOS: 74 U/L / ALT: 62 U/L / AST: 36 U/L / GGT: x           PT/INR - ( 06 May 2024 09:20 )   PT: 16.4 sec;   INR: 1.47 ratio         PTT - ( 06 May 2024 09:20 )  PTT:35.3 sec      Urinalysis Basic - ( 08 May 2024 08:08 )    Color: x / Appearance: x / SG: x / pH: x  Gluc: 171 mg/dL / Ketone: x  / Bili: x / Urobili: x   Blood: x / Protein: x / Nitrite: x   Leuk Esterase: x / RBC: x / WBC x   Sq Epi: x / Non Sq Epi: x / Bacteria: x          Culture - Urine (collected 05-06-24 @ 12:24)  Source: Clean Catch Clean Catch (Midstream)  Final Report (05-07-24 @ 16:22):    No growth    Culture - Blood (collected 05-06-24 @ 09:20)  Source: .Blood Blood-Peripheral  Preliminary Report (05-07-24 @ 16:02):    No growth at 24 hours    Culture - Blood (collected 05-06-24 @ 09:20)  Source: .Blood Blood-Peripheral  Preliminary Report (05-07-24 @ 16:02):    No growth at 24 hours        RADIOLOGY & ADDITIONAL STUDIES:        HPI:    57 y/o male with a PMHx of Afib on Eliquis, Asthma, DM, HTN, HLD admitted c/o cough, fevers, chills, fatigue, SOB and right sided CP when coughing and breathing deeply x3 to 4 days. Symptoms progressively worsening. Pt travelled to [facility] 1 month ago. Denies LE swelling. No known sick contacts. No other complaints at this time. pat found to have rapid AFib, pat also +flu, seen for pulmonary for flare of asthma. pat sitting in bed, no respiratory distress.     PAST MEDICAL HISTORY:  A-fib   Asthma   Diabetes   High cholesterol   HTN (hypertension).     PAST SURGICAL HISTORY:  H/O colonoscopy.    Social Hx:   no smoking, no Etoh    Family Hx:   mother CVA       PAST MEDICAL & SURGICAL HISTORY:  HTN (hypertension)      High cholesterol      Asthma      A-fib      Diabetes      H/O colonoscopy          Home Medications:  Advair Diskus 250 mcg-50 mcg inhalation powder: 1 inhaled 2 times a day (06 May 2024 15:00)  atorvastatin 40 mg oral tablet: 1 tab(s) orally once a day (06 May 2024 15:00)  bethanechol 50 mg oral tablet: 1 tab(s) orally 2 times a day (06 May 2024 15:00)  DilTIAZem (Eqv-Cardizem CD) 240 mg/24 hours oral capsule, extended release: 1 cap(s) orally once a day (06 May 2024 15:00)  Eliquis 5 mg oral tablet: 1 tab(s) orally 2 times a day (06 May 2024 15:00)  metFORMIN 500 mg oral tablet: 1 tab(s) orally once a day (06 May 2024 15:00)  sacubitril-valsartan 24 mg-26 mg oral tablet: 1 tab(s) orally 2 times a day (06 May 2024 15:00)  spironolactone 25 mg oral tablet: 1 tab(s) orally once a day (06 May 2024 15:00)  tamsulosin 0.4 mg oral capsule: 1 cap(s) orally once a day (06 May 2024 15:00)      MEDICATIONS  (STANDING):  albuterol/ipratropium for Nebulization 3 milliLiter(s) Nebulizer every 6 hours  apixaban 5 milliGRAM(s) Oral every 12 hours  bethanechol 50 milliGRAM(s) Oral two times a day  budesonide 160 MICROgram(s)/formoterol 4.5 MICROgram(s) Inhaler 2 Puff(s) Inhalation two times a day  chlorhexidine 4% Liquid 1 Application(s) Topical <User Schedule>  dextrose 10% Bolus 125 milliLiter(s) IV Bolus once  dextrose 5%. 1000 milliLiter(s) (50 mL/Hr) IV Continuous <Continuous>  dextrose 5%. 1000 milliLiter(s) (100 mL/Hr) IV Continuous <Continuous>  dextrose 50% Injectable 12.5 Gram(s) IV Push once  dextrose 50% Injectable 25 Gram(s) IV Push once  diltiazem    milliGRAM(s) Oral daily  diltiazem Infusion 5 mG/Hr (5 mL/Hr) IV Continuous <Continuous>  glucagon  Injectable 1 milliGRAM(s) IntraMuscular once  guaiFENesin ER 1200 milliGRAM(s) Oral every 12 hours  insulin lispro (ADMELOG) corrective regimen sliding scale   SubCutaneous three times a day before meals  methylPREDNISolone sodium succinate Injectable 40 milliGRAM(s) IV Push two times a day  oseltamivir 75 milliGRAM(s) Oral two times a day  sacubitril 24 mG/valsartan 26 mG 1 Tablet(s) Oral two times a day  spironolactone 25 milliGRAM(s) Oral daily  tamsulosin 0.4 milliGRAM(s) Oral at bedtime    MEDICATIONS  (PRN):  acetaminophen     Tablet .. 650 milliGRAM(s) Oral every 6 hours PRN Mild Pain (1 - 3)  albuterol    90 MICROgram(s) HFA Inhaler 2 Puff(s) Inhalation every 6 hours PRN Shortness of Breath and/or Wheezing  dextrose Oral Gel 15 Gram(s) Oral once PRN Blood Glucose LESS THAN 70 milliGRAM(s)/deciliter  ondansetron Injectable 4 milliGRAM(s) IV Push every 6 hours PRN Nausea and/or Vomiting      Allergies    No Known Allergies    Intolerances        SOCIAL HISTORY: Denies tobacco, etoh abuse or illicit drug use    FAMILY HISTORY:      Vital Signs Last 24 Hrs  T(C): 36.6 (08 May 2024 02:05), Max: 36.9 (07 May 2024 16:51)  T(F): 97.8 (08 May 2024 02:05), Max: 98.4 (07 May 2024 16:51)  HR: 107 (08 May 2024 05:55) (65 - 107)  BP: 113/82 (08 May 2024 05:55) (103/64 - 137/84)  BP(mean): --  RR: 18 (07 May 2024 21:48) (18 - 18)  SpO2: 97% (08 May 2024 02:05) (97% - 100%)    Parameters below as of 08 May 2024 02:05  Patient On (Oxygen Delivery Method): room air            REVIEW OF SYSTEMS:    CONSTITUTIONAL:  As per HPI.  HEENT:  Eyes:  No diplopia or blurred vision. ENT:  No earache, sore throat or runny nose.  CARDIOVASCULAR:  No pressure, squeezing, tightness, heaviness or aching about the chest, neck, axilla or epigastrium.  RESPIRATORY:  No cough, +shortness of breath, PND or orthopnea.  GASTROINTESTINAL:  No nausea, vomiting or diarrhea.  GENITOURINARY:  No dysuria, frequency or urgency.  MUSCULOSKELETAL:  As per HPI.  SKIN:  No change in skin, hair or nails.  NEUROLOGIC:  No paresthesias, fasciculations, seizures or weakness.  PSYCHIATRIC:  No disorder of thought or mood.  ENDOCRINE:  No heat or cold intolerance, polyuria or polydipsia.  HEMATOLOGICAL:  No easy bruising or bleedings:  .     PHYSICAL EXAMINATION:    GENERAL APPEARANCE:  Pt. is not currently dyspneic, in no distress. Pt. is alert, oriented, and pleasant.  HEENT:  Pupils are normal and react normally. No icterus. Mucous membranes well colored.  NECK:  Supple. No lymphadenopathy. Jugular venous pressure not elevated. Carotids equal.   HEART:   The cardiac impulse has a normal quality. Regular. Normal S1 and S2. There are no murmurs, rubs or gallops noted  CHEST:  Chest rhonchi to auscultation. Normal respiratory effort.  ABDOMEN:  Soft and nontender.   EXTREMITIES:  There is no cyanosis, clubbing or edema.   SKIN:  No rash or significant lesions are noted.    LABS:                        13.2   23.53 )-----------( 222      ( 08 May 2024 08:08 )             41.1     05-08    141  |  113<H>  |  26<H>  ----------------------------<  171<H>  4.2   |  21<L>  |  1.10    Ca    9.2      08 May 2024 08:08    TPro  6.3  /  Alb  3.7  /  TBili  1.8<H>  /  DBili  x   /  AST  36  /  ALT  62  /  AlkPhos  74  05-06    LIVER FUNCTIONS - ( 06 May 2024 09:20 )  Alb: 3.7 g/dL / Pro: 6.3 gm/dL / ALK PHOS: 74 U/L / ALT: 62 U/L / AST: 36 U/L / GGT: x           PT/INR - ( 06 May 2024 09:20 )   PT: 16.4 sec;   INR: 1.47 ratio         PTT - ( 06 May 2024 09:20 )  PTT:35.3 sec      Urinalysis Basic - ( 08 May 2024 08:08 )    Color: x / Appearance: x / SG: x / pH: x  Gluc: 171 mg/dL / Ketone: x  / Bili: x / Urobili: x   Blood: x / Protein: x / Nitrite: x   Leuk Esterase: x / RBC: x / WBC x   Sq Epi: x / Non Sq Epi: x / Bacteria: x          Culture - Urine (collected 05-06-24 @ 12:24)  Source: Clean Catch Clean Catch (Midstream)  Final Report (05-07-24 @ 16:22):    No growth    Culture - Blood (collected 05-06-24 @ 09:20)  Source: .Blood Blood-Peripheral  Preliminary Report (05-07-24 @ 16:02):    No growth at 24 hours    Culture - Blood (collected 05-06-24 @ 09:20)  Source: .Blood Blood-Peripheral  Preliminary Report (05-07-24 @ 16:02):    No growth at 24 hours        RADIOLOGY & ADDITIONAL STUDIES:     CT Chest No Cont (05.06.24 @ 10:58) >  IMPRESSION:  No acute pulmonary findings.    Bilateral calcified pleural plaques suggest prior asbestos exposure.    Enlarged pulmonary artery, a nonspecific finding which can be seen in the   setting of pulmonary hypertension.

## 2024-05-08 NOTE — CONSULT NOTE ADULT - ASSESSMENT
PROBLEMS:    Asthma exacerbation due to Influenza A URI:  Rapid Afib:  GABRIELE  Chronic diastolic dysfunction:    PLAN:    IV Solumedrol Q12HR  Albuterol/ICS  Tamiflu  Rapid Afib-c/w  telemetry  Cardizem to 300mg/day/c/w Xarelto   Sleep study as outpat  Chronic diastolic dysfunction-c/w Entresto/Aldactone   Dvt prophylasix

## 2024-05-09 LAB
GLUCOSE BLDC GLUCOMTR-MCNC: 157 MG/DL — HIGH (ref 70–99)
GLUCOSE BLDC GLUCOMTR-MCNC: 164 MG/DL — HIGH (ref 70–99)
GLUCOSE BLDC GLUCOMTR-MCNC: 168 MG/DL — HIGH (ref 70–99)
GLUCOSE BLDC GLUCOMTR-MCNC: 203 MG/DL — HIGH (ref 70–99)

## 2024-05-09 PROCEDURE — 99232 SBSQ HOSP IP/OBS MODERATE 35: CPT

## 2024-05-09 RX ORDER — DILTIAZEM HCL 120 MG
360 CAPSULE, EXT RELEASE 24 HR ORAL DAILY
Refills: 0 | Status: DISCONTINUED | OUTPATIENT
Start: 2024-05-10 | End: 2024-05-10

## 2024-05-09 RX ORDER — POTASSIUM CHLORIDE 20 MEQ
40 PACKET (EA) ORAL ONCE
Refills: 0 | Status: COMPLETED | OUTPATIENT
Start: 2024-05-09 | End: 2024-05-09

## 2024-05-09 RX ORDER — FUROSEMIDE 40 MG
40 TABLET ORAL ONCE
Refills: 0 | Status: COMPLETED | OUTPATIENT
Start: 2024-05-09 | End: 2024-05-09

## 2024-05-09 RX ORDER — DILTIAZEM HCL 120 MG
60 CAPSULE, EXT RELEASE 24 HR ORAL ONCE
Refills: 0 | Status: COMPLETED | OUTPATIENT
Start: 2024-05-09 | End: 2024-05-09

## 2024-05-09 RX ADMIN — Medication 75 MILLIGRAM(S): at 09:58

## 2024-05-09 RX ADMIN — BUDESONIDE AND FORMOTEROL FUMARATE DIHYDRATE 2 PUFF(S): 160; 4.5 AEROSOL RESPIRATORY (INHALATION) at 08:42

## 2024-05-09 RX ADMIN — Medication 75 MILLIGRAM(S): at 22:03

## 2024-05-09 RX ADMIN — Medication 300 MILLIGRAM(S): at 09:58

## 2024-05-09 RX ADMIN — Medication 2: at 17:36

## 2024-05-09 RX ADMIN — SACUBITRIL AND VALSARTAN 1 TABLET(S): 24; 26 TABLET, FILM COATED ORAL at 09:58

## 2024-05-09 RX ADMIN — Medication 1200 MILLIGRAM(S): at 09:58

## 2024-05-09 RX ADMIN — Medication 1200 MILLIGRAM(S): at 22:03

## 2024-05-09 RX ADMIN — Medication 60 MILLIGRAM(S): at 17:37

## 2024-05-09 RX ADMIN — SACUBITRIL AND VALSARTAN 1 TABLET(S): 24; 26 TABLET, FILM COATED ORAL at 22:02

## 2024-05-09 RX ADMIN — APIXABAN 5 MILLIGRAM(S): 2.5 TABLET, FILM COATED ORAL at 09:58

## 2024-05-09 RX ADMIN — Medication 2: at 12:37

## 2024-05-09 RX ADMIN — Medication 3 MILLILITER(S): at 20:30

## 2024-05-09 RX ADMIN — SPIRONOLACTONE 25 MILLIGRAM(S): 25 TABLET, FILM COATED ORAL at 09:58

## 2024-05-09 RX ADMIN — CHLORHEXIDINE GLUCONATE 1 APPLICATION(S): 213 SOLUTION TOPICAL at 09:59

## 2024-05-09 RX ADMIN — TAMSULOSIN HYDROCHLORIDE 0.4 MILLIGRAM(S): 0.4 CAPSULE ORAL at 22:03

## 2024-05-09 RX ADMIN — Medication 3 MILLILITER(S): at 14:55

## 2024-05-09 RX ADMIN — BUDESONIDE AND FORMOTEROL FUMARATE DIHYDRATE 2 PUFF(S): 160; 4.5 AEROSOL RESPIRATORY (INHALATION) at 20:32

## 2024-05-09 RX ADMIN — Medication 50 MILLIGRAM(S): at 09:58

## 2024-05-09 RX ADMIN — Medication 3 MILLILITER(S): at 08:41

## 2024-05-09 RX ADMIN — Medication 40 MILLIEQUIVALENT(S): at 17:37

## 2024-05-09 RX ADMIN — Medication 40 MILLIGRAM(S): at 17:38

## 2024-05-09 RX ADMIN — APIXABAN 5 MILLIGRAM(S): 2.5 TABLET, FILM COATED ORAL at 22:02

## 2024-05-09 RX ADMIN — Medication 2: at 08:55

## 2024-05-09 RX ADMIN — Medication 40 MILLIGRAM(S): at 10:04

## 2024-05-09 RX ADMIN — Medication 50 MILLIGRAM(S): at 22:04

## 2024-05-10 ENCOUNTER — TRANSCRIPTION ENCOUNTER (OUTPATIENT)
Age: 59
End: 2024-05-10

## 2024-05-10 VITALS
DIASTOLIC BLOOD PRESSURE: 60 MMHG | OXYGEN SATURATION: 98 % | HEART RATE: 85 BPM | RESPIRATION RATE: 18 BRPM | TEMPERATURE: 99 F | SYSTOLIC BLOOD PRESSURE: 108 MMHG

## 2024-05-10 LAB
ANION GAP SERPL CALC-SCNC: 6 MMOL/L — SIGNIFICANT CHANGE UP (ref 5–17)
BUN SERPL-MCNC: 26 MG/DL — HIGH (ref 7–23)
CALCIUM SERPL-MCNC: 9.2 MG/DL — SIGNIFICANT CHANGE UP (ref 8.5–10.1)
CHLORIDE SERPL-SCNC: 109 MMOL/L — HIGH (ref 96–108)
CO2 SERPL-SCNC: 24 MMOL/L — SIGNIFICANT CHANGE UP (ref 22–31)
CREAT SERPL-MCNC: 1.04 MG/DL — SIGNIFICANT CHANGE UP (ref 0.5–1.3)
EGFR: 82 ML/MIN/1.73M2 — SIGNIFICANT CHANGE UP
GLUCOSE BLDC GLUCOMTR-MCNC: 145 MG/DL — HIGH (ref 70–99)
GLUCOSE BLDC GLUCOMTR-MCNC: 157 MG/DL — HIGH (ref 70–99)
GLUCOSE BLDC GLUCOMTR-MCNC: 270 MG/DL — HIGH (ref 70–99)
GLUCOSE SERPL-MCNC: 189 MG/DL — HIGH (ref 70–99)
HCT VFR BLD CALC: 43.6 % — SIGNIFICANT CHANGE UP (ref 39–50)
HGB BLD-MCNC: 14.5 G/DL — SIGNIFICANT CHANGE UP (ref 13–17)
MAGNESIUM SERPL-MCNC: 2.1 MG/DL — SIGNIFICANT CHANGE UP (ref 1.6–2.6)
MCHC RBC-ENTMCNC: 29.2 PG — SIGNIFICANT CHANGE UP (ref 27–34)
MCHC RBC-ENTMCNC: 33.3 GM/DL — SIGNIFICANT CHANGE UP (ref 32–36)
MCV RBC AUTO: 87.9 FL — SIGNIFICANT CHANGE UP (ref 80–100)
PLATELET # BLD AUTO: 264 K/UL — SIGNIFICANT CHANGE UP (ref 150–400)
POTASSIUM SERPL-MCNC: 4.3 MMOL/L — SIGNIFICANT CHANGE UP (ref 3.5–5.3)
POTASSIUM SERPL-SCNC: 4.3 MMOL/L — SIGNIFICANT CHANGE UP (ref 3.5–5.3)
RBC # BLD: 4.96 M/UL — SIGNIFICANT CHANGE UP (ref 4.2–5.8)
RBC # FLD: 14.6 % — HIGH (ref 10.3–14.5)
SODIUM SERPL-SCNC: 139 MMOL/L — SIGNIFICANT CHANGE UP (ref 135–145)
TROPONIN I, HIGH SENSITIVITY RESULT: 5.77 NG/L — SIGNIFICANT CHANGE UP
WBC # BLD: 16.14 K/UL — HIGH (ref 3.8–10.5)
WBC # FLD AUTO: 16.14 K/UL — HIGH (ref 3.8–10.5)

## 2024-05-10 PROCEDURE — 99239 HOSP IP/OBS DSCHRG MGMT >30: CPT

## 2024-05-10 PROCEDURE — 93010 ELECTROCARDIOGRAM REPORT: CPT

## 2024-05-10 RX ORDER — FAMOTIDINE 10 MG/ML
20 INJECTION INTRAVENOUS ONCE
Refills: 0 | Status: DISCONTINUED | OUTPATIENT
Start: 2024-05-10 | End: 2024-05-10

## 2024-05-10 RX ORDER — DILTIAZEM HCL 120 MG
1 CAPSULE, EXT RELEASE 24 HR ORAL
Refills: 0 | DISCHARGE

## 2024-05-10 RX ORDER — DILTIAZEM HCL 120 MG
1 CAPSULE, EXT RELEASE 24 HR ORAL
Qty: 30 | Refills: 0
Start: 2024-05-10

## 2024-05-10 RX ADMIN — SPIRONOLACTONE 25 MILLIGRAM(S): 25 TABLET, FILM COATED ORAL at 10:01

## 2024-05-10 RX ADMIN — Medication 3 MILLILITER(S): at 08:24

## 2024-05-10 RX ADMIN — Medication 3 MILLILITER(S): at 14:04

## 2024-05-10 RX ADMIN — Medication 40 MILLIGRAM(S): at 10:01

## 2024-05-10 RX ADMIN — Medication 75 MILLIGRAM(S): at 10:01

## 2024-05-10 RX ADMIN — Medication 50 MILLIGRAM(S): at 10:01

## 2024-05-10 RX ADMIN — Medication 6: at 12:33

## 2024-05-10 RX ADMIN — Medication 360 MILLIGRAM(S): at 10:01

## 2024-05-10 RX ADMIN — Medication 1200 MILLIGRAM(S): at 10:01

## 2024-05-10 RX ADMIN — BUDESONIDE AND FORMOTEROL FUMARATE DIHYDRATE 2 PUFF(S): 160; 4.5 AEROSOL RESPIRATORY (INHALATION) at 08:24

## 2024-05-10 RX ADMIN — APIXABAN 5 MILLIGRAM(S): 2.5 TABLET, FILM COATED ORAL at 10:01

## 2024-05-10 RX ADMIN — SACUBITRIL AND VALSARTAN 1 TABLET(S): 24; 26 TABLET, FILM COATED ORAL at 10:01

## 2024-05-10 NOTE — DISCHARGE NOTE PROVIDER - POSTFACE STATEMENT FOR MINUTES SPENT
minutes on the discharge service. Non-Graft Cartilage Fenestration Text: The cartilage was fenestrated with a 2mm punch biopsy to help facilitate healing.

## 2024-05-10 NOTE — DISCHARGE NOTE PROVIDER - NSDCMRMEDTOKEN_GEN_ALL_CORE_FT
Advair Diskus 250 mcg-50 mcg inhalation powder: 1 inhaled 2 times a day  atorvastatin 40 mg oral tablet: 1 tab(s) orally once a day  bethanechol 50 mg oral tablet: 1 tab(s) orally 2 times a day  dilTIAZem 360 mg/24 hours oral capsule, extended release: 1 cap(s) orally once a day  Eliquis 5 mg oral tablet: 1 tab(s) orally 2 times a day  metFORMIN 500 mg oral tablet: 1 tab(s) orally once a day  predniSONE 10 mg oral tablet: 3 tab(s) orally once a day 3tb po daily x 2d, 2tb po daily x 2d, 1tb po daily x 2d  ProAir HFA 90 mcg/inh inhalation aerosol: 2 puff(s) inhaled every 4 hours, As Needed -for bronchospasm   sacubitril-valsartan 24 mg-26 mg oral tablet: 1 tab(s) orally 2 times a day  spironolactone 25 mg oral tablet: 1 tab(s) orally once a day  tamsulosin 0.4 mg oral capsule: 1 cap(s) orally once a day

## 2024-05-10 NOTE — DISCHARGE NOTE PROVIDER - CARE PROVIDER_API CALL
Sid Doll  Pulmonary Disease  [address] [place] [address]  Phone: [phone]  Fax: [phone]  Follow Up Time: 1 week    Joceline Samuel  Cardiovascular Disease  [address], [address]  [place] [address]  Phone: [phone]  Fax: [phone]  Follow Up Time: 2 weeks

## 2024-05-10 NOTE — DISCHARGE NOTE PROVIDER - NSDCCPCAREPLAN_GEN_ALL_CORE_FT
PRINCIPAL DISCHARGE DIAGNOSIS  Diagnosis: Influenza A  Assessment and Plan of Treatment:       SECONDARY DISCHARGE DIAGNOSES  Diagnosis: Chronic atrial fibrillation  Assessment and Plan of Treatment: RVR

## 2024-05-10 NOTE — CHART NOTE - NSCHARTNOTEFT_GEN_A_CORE
Was notified by the nurse that pt was c/o chest pain. Upon my assessment, describes it more like burning and radiating across his chest, 2/10. Denies any shortness of breath, dizziness, syncope. Ordered CBC, BMP, mag, trop, EKG. Will give tylenol and IV pepcid. Was notified by the nurse that pt was c/o chest pain. Upon my assessment, describes it more like burning and radiating across his chest, 2/10. Denies any shortness of breath, dizziness, syncope. On tele, controlled Afib HR 80s. Ordered CBC, BMP, mag, trop, EKG. Will give tylenol and IV pepcid.

## 2024-05-10 NOTE — DISCHARGE NOTE PROVIDER - PROVIDER TOKENS
PROVIDER:[TOKEN:[[id number]],FOLLOWUP:[1 week]],PROVIDER:[TOKEN:[[id number]],FOLLOWUP:[2 weeks]]

## 2024-05-10 NOTE — PROGRESS NOTE ADULT - SUBJECTIVE AND OBJECTIVE BOX
History of Present Illness:   · 57 y/o male with a PMHx of Afib on Eliquis, Asthma, DM, HTN, HLD presents to the ED c/o cough, fevers, chills, fatigue, SOB and right sided CP when coughing and breathing deeply x3 to 4 days. Symptoms progressively worsening. Pt travelled to [facility] 1 month ago. Denies LE swelling. No known sick contacts. No other complaints at this time.  -found to have rapid AFib     5.7: c/w wheezing but feeling better overall, less coughing   5.8: less wheezing, Afib noted       REVIEW OF SYSTEMS:    CONSTITUTIONAL: No weakness, No fevers or chills  ENT: No ear ache, No sorethroat  NECK: No pain, No stiffness  RESPIRATORY: + cough, + wheezing, No hemoptysis; + dyspnea  CARDIOVASCULAR: No chest pain, No palpitations  GASTROINTESTINAL: No abd pain, No nausea, No vomiting, No hematemesis, No diarrhea or constipation. No melena, No hematochezia.  GENITOURINARY: No dysuria, No  hematuria  NEUROLOGICAL: No diplopia, No paresthesia, No motor dysfunction  MUSCULOSKELETAL: No arthralgia, No myalgia  SKIN: No rashes, or lesions   PSYCH: no anxiety, no suicidal ideation    All other review of systems is negative unless indicated above    Vital Signs Last 24 Hrs  T(C): 37.1 (08 May 2024 10:20), Max: 37.1 (08 May 2024 10:20)  T(F): 98.7 (08 May 2024 10:20), Max: 98.7 (08 May 2024 10:20)  HR: 79 (08 May 2024 10:20) (65 - 107)  BP: 104/60 (08 May 2024 10:20) (103/64 - 121/83)  RR: 18 (08 May 2024 10:20) (18 - 18)  SpO2: 97% (08 May 2024 10:20) (97% - 100%)    Parameters below as of 08 May 2024 10:20  Patient On (Oxygen Delivery Method): room air        PHYSICAL EXAM:    GENERAL: NAD  HEENT:  NC/AT, EOMI, PERRLA, No scleral icterus, Moist mucous membranes  NECK: Supple, No JVD  CNS:  Alert & Oriented X3, Motor Strength 5/5 B/L upper and lower extremities; DTRs 2+ intact   LUNG: Normal Breath sounds, Clear to auscultation bilaterally, + rales, No rhonchi, + wheezing  HEART: RRR; No murmurs, No rubs  ABDOMEN: +BS, ST/ND/NT  GENITOURINARY: Voiding, Bladder not distended  EXTREMITIES:  2+ Peripheral Pulses, No clubbing, No cyanosis, No tibial edema  MUSCULOSKELTAL: Joints normal ROM, No TTP, No effusion  SKIN: no rashes  RECTAL: deferred, not indicated  BREAST: deferred    Labs:                        13.2   23.53 )-----------( 222      ( 08 May 2024 08:08 )             41.1     05-08    141  |  113<H>  |  26<H>  ----------------------------<  171<H>  4.2   |  21<L>  |  1.10    Ca    9.2      08 May 2024 08:08        MEDICATIONS  (STANDING):  albuterol/ipratropium for Nebulization 3 milliLiter(s) Nebulizer every 6 hours  apixaban 5 milliGRAM(s) Oral every 12 hours  bethanechol 50 milliGRAM(s) Oral two times a day  budesonide 160 MICROgram(s)/formoterol 4.5 MICROgram(s) Inhaler 2 Puff(s) Inhalation two times a day  chlorhexidine 4% Liquid 1 Application(s) Topical <User Schedule>  diltiazem    milliGRAM(s) Oral daily  diltiazem Infusion 5 mG/Hr (5 mL/Hr) IV Continuous <Continuous>  glucagon  Injectable 1 milliGRAM(s) IntraMuscular once  guaiFENesin ER 1200 milliGRAM(s) Oral every 12 hours  insulin lispro (ADMELOG) corrective regimen sliding scale   SubCutaneous three times a day before meals  methylPREDNISolone sodium succinate Injectable 40 milliGRAM(s) IV Push two times a day  oseltamivir 75 milliGRAM(s) Oral two times a day  sacubitril 24 mG/valsartan 26 mG 1 Tablet(s) Oral two times a day  spironolactone 25 milliGRAM(s) Oral daily  tamsulosin 0.4 milliGRAM(s) Oral at bedtime      a/p:    1. Asthma exacerbation due to Influenza A URI:  Solumedrol IV , Albuterol , ICS  Tamiflu day#3    2. Rapid Afib: not controlled   c/w  telemetry  Trops x 3: negative   Didn;'t respond to BB  c/w Cardizem IV drip,   Increase Cardizem to 300mg/day  c/w Xarelto   Cardiology evaluation   Replenish K    3. Chronic diastolic dysfunction:  c/w Entresto, Aldactone 
Subjective:    pat better, sitting in bed, no new complaint.    Home Medications:  Advair Diskus 250 mcg-50 mcg inhalation powder: 1 inhaled 2 times a day (06 May 2024 15:00)  atorvastatin 40 mg oral tablet: 1 tab(s) orally once a day (06 May 2024 15:00)  bethanechol 50 mg oral tablet: 1 tab(s) orally 2 times a day (06 May 2024 15:00)  DilTIAZem (Eqv-Cardizem CD) 240 mg/24 hours oral capsule, extended release: 1 cap(s) orally once a day (06 May 2024 15:00)  Eliquis 5 mg oral tablet: 1 tab(s) orally 2 times a day (06 May 2024 15:00)  metFORMIN 500 mg oral tablet: 1 tab(s) orally once a day (06 May 2024 15:00)  sacubitril-valsartan 24 mg-26 mg oral tablet: 1 tab(s) orally 2 times a day (06 May 2024 15:00)  spironolactone 25 mg oral tablet: 1 tab(s) orally once a day (06 May 2024 15:00)  tamsulosin 0.4 mg oral capsule: 1 cap(s) orally once a day (06 May 2024 15:00)    MEDICATIONS  (STANDING):  albuterol/ipratropium for Nebulization 3 milliLiter(s) Nebulizer every 6 hours  apixaban 5 milliGRAM(s) Oral every 12 hours  bethanechol 50 milliGRAM(s) Oral two times a day  budesonide 160 MICROgram(s)/formoterol 4.5 MICROgram(s) Inhaler 2 Puff(s) Inhalation two times a day  chlorhexidine 4% Liquid 1 Application(s) Topical <User Schedule>  dextrose 10% Bolus 125 milliLiter(s) IV Bolus once  dextrose 5%. 1000 milliLiter(s) (100 mL/Hr) IV Continuous <Continuous>  dextrose 5%. 1000 milliLiter(s) (50 mL/Hr) IV Continuous <Continuous>  dextrose 50% Injectable 25 Gram(s) IV Push once  dextrose 50% Injectable 12.5 Gram(s) IV Push once  diltiazem    milliGRAM(s) Oral daily  famotidine Injectable 20 milliGRAM(s) IV Push once  glucagon  Injectable 1 milliGRAM(s) IntraMuscular once  guaiFENesin ER 1200 milliGRAM(s) Oral every 12 hours  insulin lispro (ADMELOG) corrective regimen sliding scale   SubCutaneous three times a day before meals  oseltamivir 75 milliGRAM(s) Oral two times a day  predniSONE   Tablet 40 milliGRAM(s) Oral daily  sacubitril 24 mG/valsartan 26 mG 1 Tablet(s) Oral two times a day  spironolactone 25 milliGRAM(s) Oral daily  tamsulosin 0.4 milliGRAM(s) Oral at bedtime    MEDICATIONS  (PRN):  acetaminophen     Tablet .. 650 milliGRAM(s) Oral every 6 hours PRN Mild Pain (1 - 3)  albuterol    90 MICROgram(s) HFA Inhaler 2 Puff(s) Inhalation every 6 hours PRN Shortness of Breath and/or Wheezing  dextrose Oral Gel 15 Gram(s) Oral once PRN Blood Glucose LESS THAN 70 milliGRAM(s)/deciliter  ondansetron Injectable 4 milliGRAM(s) IV Push every 6 hours PRN Nausea and/or Vomiting      Allergies    No Known Allergies    Intolerances        Vital Signs Last 24 Hrs  T(C): 37.6 (10 May 2024 08:00), Max: 37.6 (10 May 2024 08:00)  T(F): 99.7 (10 May 2024 08:00), Max: 99.7 (10 May 2024 08:00)  HR: 92 (10 May 2024 14:11) (40 - 94)  BP: 129/71 (10 May 2024 08:00) (107/73 - 129/71)  BP(mean): 87 (10 May 2024 08:00) (87 - 87)  RR: 18 (10 May 2024 08:00) (18 - 18)  SpO2: 99% (10 May 2024 14:11) (96% - 99%)    Parameters below as of 10 May 2024 14:11  Patient On (Oxygen Delivery Method): room air          PHYSICAL EXAMINATION:    NECK:  Supple. No lymphadenopathy. Jugular venous pressure not elevated. Carotids equal.   HEART:   The cardiac impulse has a normal quality. Reg., Nl S1 and S2.  There are no murmurs, rubs or gallops noted  CHEST:  Chest rhonchi to auscultation. Normal respiratory effort.  ABDOMEN:  Soft and nontender.   EXTREMITIES:  There is no edema.       LABS:                        14.5   16.14 )-----------( 264      ( 10 May 2024 07:29 )             43.6     05-10    139  |  109<H>  |  26<H>  ----------------------------<  189<H>  4.3   |  24  |  1.04    Ca    9.2      10 May 2024 07:29  Mg     2.1     05-10        Urinalysis Basic - ( 10 May 2024 07:29 )    Color: x / Appearance: x / SG: x / pH: x  Gluc: 189 mg/dL / Ketone: x  / Bili: x / Urobili: x   Blood: x / Protein: x / Nitrite: x   Leuk Esterase: x / RBC: x / WBC x   Sq Epi: x / Non Sq Epi: x / Bacteria: x            
    History of Present Illness:   · 59 y/o male with a PMHx of Afib on Eliquis, Asthma, DM, HTN, HLD presents to the ED c/o cough, fevers, chills, fatigue, SOB and right sided CP when coughing and breathing deeply x3 to 4 days. Symptoms progressively worsening. Pt travelled to [facility] 1 month ago. Denies LE swelling. No known sick contacts. No other complaints at this time.  -found to have rapid AFib     5.7: c/w wheezing but feeling better overall, less coughing       REVIEW OF SYSTEMS:    CONSTITUTIONAL: No weakness, No fevers or chills  ENT: No ear ache, No sorethroat  NECK: No pain, No stiffness  RESPIRATORY: + cough, + wheezing, No hemoptysis; + dyspnea  CARDIOVASCULAR: No chest pain, No palpitations  GASTROINTESTINAL: No abd pain, No nausea, No vomiting, No hematemesis, No diarrhea or constipation. No melena, No hematochezia.  GENITOURINARY: No dysuria, No  hematuria  NEUROLOGICAL: No diplopia, No paresthesia, No motor dysfunction  MUSCULOSKELETAL: No arthralgia, No myalgia  SKIN: No rashes, or lesions   PSYCH: no anxiety, no suicidal ideation    All other review of systems is negative unless indicated above    Vital Signs Last 24 Hrs  T(C): 36.9 (07 May 2024 16:51), Max: 37.1 (07 May 2024 02:58)  T(F): 98.4 (07 May 2024 16:51), Max: 98.8 (07 May 2024 02:58)  HR: 88 (07 May 2024 16:51) (88 - 140)  BP: 113/98 (07 May 2024 16:51) (111/73 - 147/87)  BP(mean): 109 (07 May 2024 04:24) (109 - 109)  RR: 18 (07 May 2024 16:51) (16 - 20)  SpO2: 100% (07 May 2024 16:51) (95% - 100%)    Parameters below as of 07 May 2024 16:51  Patient On (Oxygen Delivery Method): room air          PHYSICAL EXAM:    GENERAL: NAD  HEENT:  NC/AT, EOMI, PERRLA, No scleral icterus, Moist mucous membranes  NECK: Supple, No JVD  CNS:  Alert & Oriented X3, Motor Strength 5/5 B/L upper and lower extremities; DTRs 2+ intact   LUNG: Normal Breath sounds, Clear to auscultation bilaterally, + rales, No rhonchi, + wheezing  HEART: RRR; No murmurs, No rubs  ABDOMEN: +BS, ST/ND/NT  GENITOURINARY: Voiding, Bladder not distended  EXTREMITIES:  2+ Peripheral Pulses, No clubbing, No cyanosis, No tibial edema  MUSCULOSKELTAL: Joints normal ROM, No TTP, No effusion  SKIN: no rashes  RECTAL: deferred, not indicated  BREAST: deferred                          13.5   12.56 )-----------( 186      ( 06 May 2024 09:20 )             41.2     05-06    137  |  106  |  15  ----------------------------<  112<H>  3.4<L>   |  24  |  1.20    Ca    9.1      06 May 2024 09:20    TPro  6.3  /  Alb  3.7  /  TBili  1.8<H>  /  DBili  x   /  AST  36  /  ALT  62  /  AlkPhos  74  05-06    Vancomycin levels:   Cultures:     MEDICATIONS  (STANDING):  apixaban 5 milliGRAM(s) Oral every 12 hours  bethanechol 50 milliGRAM(s) Oral two times a day  budesonide 160 MICROgram(s)/formoterol 4.5 MICROgram(s) Inhaler 2 Puff(s) Inhalation two times a day  diltiazem    milliGRAM(s) Oral daily  glucagon  Injectable 1 milliGRAM(s) IntraMuscular once  insulin lispro (ADMELOG) corrective regimen sliding scale   SubCutaneous three times a day before meals  methylPREDNISolone sodium succinate Injectable 40 milliGRAM(s) IV Push two times a day  oseltamivir 75 milliGRAM(s) Oral two times a day  potassium chloride    Tablet ER 40 milliEquivalent(s) Oral every 4 hours  sacubitril 24 mG/valsartan 26 mG 1 Tablet(s) Oral two times a day  spironolactone 25 milliGRAM(s) Oral daily  tamsulosin 0.4 milliGRAM(s) Oral at bedtime    MEDICATIONS  (PRN):  acetaminophen     Tablet .. 650 milliGRAM(s) Oral every 6 hours PRN Mild Pain (1 - 3)  albuterol    90 MICROgram(s) HFA Inhaler 2 Puff(s) Inhalation every 6 hours PRN Shortness of Breath and/or Wheezing  dextrose Oral Gel 15 Gram(s) Oral once PRN Blood Glucose LESS THAN 70 milliGRAM(s)/deciliter  ondansetron Injectable 4 milliGRAM(s) IV Push every 6 hours PRN Nausea and/or Vomiting      all labs reviewed  all imaging reviewed    a/p:    1. Asthma exacerbation due to Influenza A URI:  Solumedrol IV , Albuterol , ICS  Tamiflu day#2    2. Rapid Afib:  c/w  telemetry  Trops x 3: negative   Didn;'t respond to BB  c/w Cardizem IV drip,   Increase Cardizem to 300mg/day  c/w Xarelto   Cardiology evaluation   Replenish K    3. Chronic diastolic dysfunction:  c/w Entresto, Aldactone 
    History of Present Illness:   · 59 y/o male with a PMHx of Afib on Eliquis, Asthma, DM, HTN, HLD presents to the ED c/o cough, fevers, chills, fatigue, SOB and right sided CP when coughing and breathing deeply x3 to 4 days. Symptoms progressively worsening. Pt travelled to [facility] 1 month ago. Denies LE swelling. No known sick contacts. No other complaints at this time.  -found to have rapid AFib     5.7: c/w wheezing but feeling better overall, less coughing   5.8: less wheezing, Afib noted   5.9: feels better , HR improving , less wheezing, less dyspnea       REVIEW OF SYSTEMS:    CONSTITUTIONAL: No weakness, No fevers or chills  ENT: No ear ache, No sorethroat  NECK: No pain, No stiffness  RESPIRATORY: + cough, + wheezing, No hemoptysis; + dyspnea  CARDIOVASCULAR: No chest pain, No palpitations  GASTROINTESTINAL: No abd pain, No nausea, No vomiting, No hematemesis, No diarrhea or constipation. No melena, No hematochezia.  GENITOURINARY: No dysuria, No  hematuria  NEUROLOGICAL: No diplopia, No paresthesia, No motor dysfunction  MUSCULOSKELETAL: No arthralgia, No myalgia  SKIN: No rashes, or lesions   PSYCH: no anxiety, no suicidal ideation    All other review of systems is negative unless indicated above    Vital Signs Last 24 Hrs  T(C): 36.6 (09 May 2024 08:28), Max: 37.2 (08 May 2024 22:27)  T(F): 97.9 (09 May 2024 08:28), Max: 99 (08 May 2024 22:27)  HR: 82 (09 May 2024 14:55) (79 - 102)  BP: 121/81 (09 May 2024 08:28) (110/57 - 121/81)  BP(mean): 94 (09 May 2024 08:28) (94 - 94)  RR: 18 (09 May 2024 08:28) (18 - 18)  SpO2: 99% (09 May 2024 14:55) (96% - 99%)    Parameters below as of 09 May 2024 14:55  Patient On (Oxygen Delivery Method): room air        PHYSICAL EXAM:    GENERAL: NAD  HEENT:  NC/AT, EOMI, PERRLA, No scleral icterus, Moist mucous membranes  NECK: Supple, No JVD  CNS:  Alert & Oriented X3, Motor Strength 5/5 B/L upper and lower extremities; DTRs 2+ intact   LUNG: Normal Breath sounds, Clear to auscultation bilaterally, + rales, No rhonchi, + wheezing  HEART: RRR; No murmurs, No rubs  ABDOMEN: +BS, ST/ND/NT  GENITOURINARY: Voiding, Bladder not distended  EXTREMITIES:  2+ Peripheral Pulses, No clubbing, No cyanosis, No tibial edema  MUSCULOSKELTAL: Joints normal ROM, No TTP, No effusion  SKIN: no rashes  RECTAL: deferred, not indicated  BREAST: deferred    Labs:                        13.2   23.53 )-----------( 222      ( 08 May 2024 08:08 )             41.1     05-08    141  |  113<H>  |  26<H>  ----------------------------<  171<H>  4.2   |  21<L>  |  1.10    Ca    9.2      08 May 2024 08:08        MEDICATIONS  (STANDING):  albuterol/ipratropium for Nebulization 3 milliLiter(s) Nebulizer every 6 hours  apixaban 5 milliGRAM(s) Oral every 12 hours  bethanechol 50 milliGRAM(s) Oral two times a day  budesonide 160 MICROgram(s)/formoterol 4.5 MICROgram(s) Inhaler 2 Puff(s) Inhalation two times a day  chlorhexidine 4% Liquid 1 Application(s) Topical <User Schedule>  diltiazem    milliGRAM(s) Oral daily  diltiazem Infusion 5 mG/Hr (5 mL/Hr) IV Continuous <Continuous>  glucagon  Injectable 1 milliGRAM(s) IntraMuscular once  guaiFENesin ER 1200 milliGRAM(s) Oral every 12 hours  insulin lispro (ADMELOG) corrective regimen sliding scale   SubCutaneous three times a day before meals  methylPREDNISolone sodium succinate Injectable 40 milliGRAM(s) IV Push two times a day  oseltamivir 75 milliGRAM(s) Oral two times a day  sacubitril 24 mG/valsartan 26 mG 1 Tablet(s) Oral two times a day  spironolactone 25 milliGRAM(s) Oral daily  tamsulosin 0.4 milliGRAM(s) Oral at bedtime      a/p:    1. Asthma exacerbation due to Influenza A URI: improving   Solumedrol IV will change to Prednisone in am , Albuterol , ICS  Tamiflu day#4    2. Rapid Afib: not controlled   c/w  telemetry  Trops x 3: negative   Didn;'t respond to BB  c/w Cardizem IV drip,   Increase Cardizem to 360 mg/day  c/w Xarelto   Cardiology evaluation   Replenish K    3. Chronic diastolic dysfunction:  c/w Entresto, Aldactone   will give one dose of diuretic for possible fluid overload 
Subjective:    pat better, sitting in bed, no new complaint.    Home Medications:  Advair Diskus 250 mcg-50 mcg inhalation powder: 1 inhaled 2 times a day (06 May 2024 15:00)  atorvastatin 40 mg oral tablet: 1 tab(s) orally once a day (06 May 2024 15:00)  bethanechol 50 mg oral tablet: 1 tab(s) orally 2 times a day (06 May 2024 15:00)  DilTIAZem (Eqv-Cardizem CD) 240 mg/24 hours oral capsule, extended release: 1 cap(s) orally once a day (06 May 2024 15:00)  Eliquis 5 mg oral tablet: 1 tab(s) orally 2 times a day (06 May 2024 15:00)  metFORMIN 500 mg oral tablet: 1 tab(s) orally once a day (06 May 2024 15:00)  sacubitril-valsartan 24 mg-26 mg oral tablet: 1 tab(s) orally 2 times a day (06 May 2024 15:00)  spironolactone 25 mg oral tablet: 1 tab(s) orally once a day (06 May 2024 15:00)  tamsulosin 0.4 mg oral capsule: 1 cap(s) orally once a day (06 May 2024 15:00)    MEDICATIONS  (STANDING):  albuterol/ipratropium for Nebulization 3 milliLiter(s) Nebulizer every 6 hours  apixaban 5 milliGRAM(s) Oral every 12 hours  bethanechol 50 milliGRAM(s) Oral two times a day  budesonide 160 MICROgram(s)/formoterol 4.5 MICROgram(s) Inhaler 2 Puff(s) Inhalation two times a day  chlorhexidine 4% Liquid 1 Application(s) Topical <User Schedule>  dextrose 10% Bolus 125 milliLiter(s) IV Bolus once  dextrose 5%. 1000 milliLiter(s) (50 mL/Hr) IV Continuous <Continuous>  dextrose 5%. 1000 milliLiter(s) (100 mL/Hr) IV Continuous <Continuous>  dextrose 50% Injectable 12.5 Gram(s) IV Push once  dextrose 50% Injectable 25 Gram(s) IV Push once  glucagon  Injectable 1 milliGRAM(s) IntraMuscular once  guaiFENesin ER 1200 milliGRAM(s) Oral every 12 hours  insulin lispro (ADMELOG) corrective regimen sliding scale   SubCutaneous three times a day before meals  methylPREDNISolone sodium succinate Injectable 40 milliGRAM(s) IV Push two times a day  oseltamivir 75 milliGRAM(s) Oral two times a day  sacubitril 24 mG/valsartan 26 mG 1 Tablet(s) Oral two times a day  spironolactone 25 milliGRAM(s) Oral daily  tamsulosin 0.4 milliGRAM(s) Oral at bedtime    MEDICATIONS  (PRN):  acetaminophen     Tablet .. 650 milliGRAM(s) Oral every 6 hours PRN Mild Pain (1 - 3)  albuterol    90 MICROgram(s) HFA Inhaler 2 Puff(s) Inhalation every 6 hours PRN Shortness of Breath and/or Wheezing  dextrose Oral Gel 15 Gram(s) Oral once PRN Blood Glucose LESS THAN 70 milliGRAM(s)/deciliter  ondansetron Injectable 4 milliGRAM(s) IV Push every 6 hours PRN Nausea and/or Vomiting      Allergies    No Known Allergies    Intolerances        Vital Signs Last 24 Hrs  T(C): 36.6 (09 May 2024 08:28), Max: 37.2 (08 May 2024 22:27)  T(F): 97.9 (09 May 2024 08:28), Max: 99 (08 May 2024 22:27)  HR: 84 (09 May 2024 08:41) (79 - 102)  BP: 121/81 (09 May 2024 08:28) (110/57 - 121/81)  BP(mean): 94 (09 May 2024 08:28) (94 - 94)  RR: 18 (09 May 2024 08:28) (18 - 18)  SpO2: 98% (09 May 2024 08:41) (96% - 98%)    Parameters below as of 09 May 2024 08:41  Patient On (Oxygen Delivery Method): room air          PHYSICAL EXAMINATION:    NECK:  Supple. No lymphadenopathy. Jugular venous pressure not elevated. Carotids equal.   HEART:   The cardiac impulse has a normal quality. Reg., Nl S1 and S2.  There are no murmurs, rubs or gallops noted  CHEST:  Chest rhonchi to auscultation. Normal respiratory effort.  ABDOMEN:  Soft and nontender.   EXTREMITIES:  There is no edema.       LABS:                        13.2   23.53 )-----------( 222      ( 08 May 2024 08:08 )             41.1     05-08    141  |  113<H>  |  26<H>  ----------------------------<  171<H>  4.2   |  21<L>  |  1.10    Ca    9.2      08 May 2024 08:08        Urinalysis Basic - ( 08 May 2024 08:08 )    Color: x / Appearance: x / SG: x / pH: x  Gluc: 171 mg/dL / Ketone: x  / Bili: x / Urobili: x   Blood: x / Protein: x / Nitrite: x   Leuk Esterase: x / RBC: x / WBC x   Sq Epi: x / Non Sq Epi: x / Bacteria: x

## 2024-05-10 NOTE — PROVIDER CONTACT NOTE (OTHER) - ASSESSMENT
HR: 93, BP: 107/73. Patient complaining of chest pain and tightness, rating pain 2/10. No signs of distress noted. Denies palpitations.

## 2024-05-10 NOTE — PROGRESS NOTE ADULT - ASSESSMENT
PROBLEMS:    Asthma exacerbation due to Influenza A URI:  Rapid Afib:  GABRIELE  Chronic diastolic dysfunction:    PLAN:    pulmonary stable  Taper IV Solumedrol Qdaily  Albuterol/ICS  Tamiflu  Rapid Afib-c/w  telemetry  Cardizem to 300mg/day/c/w Xarelto   Sleep study as outpat  Chronic diastolic dysfunction-c/w Entresto/Aldactone   Dvt prophylasix  
PROBLEMS:    Asthma exacerbation due to Influenza A URI:  Rapid Afib:  GABRIELE  Chronic diastolic dysfunction:    PLAN:    pulmonary stable  Po prednisone 40mg daily  Albuterol/ICS  Tamiflu  Rapid Afib-c/w  telemetry  Cardizem to 300mg/day/c/w Xarelto   Sleep study as outpat  Chronic diastolic dysfunction-c/w Entresto/Aldactone   Dvt prophylasix

## 2024-05-10 NOTE — DISCHARGE NOTE PROVIDER - HOSPITAL COURSE
· 59 y/o male with a PMHx of Afib on Eliquis, Asthma, DM, HTN, HLD presents to the ED c/o cough, fevers, chills, fatigue, SOB and right sided CP when coughing and breathing deeply x3 to 4 days. Symptoms progressively worsening. Pt travelled to [facility] 1 month ago. Denies LE swelling. No known sick contacts. No other complaints at this time.  -found to have rapid AFib     5.7: c/w wheezing but feeling better overall, less coughing   5.8: less wheezing, Afib noted   5.9: feels better , HR improving , less wheezing, less dyspnea       REVIEW OF SYSTEMS:    CONSTITUTIONAL: No weakness, No fevers or chills  ENT: No ear ache, No sorethroat  NECK: No pain, No stiffness  RESPIRATORY: + cough, + wheezing, No hemoptysis; + dyspnea  CARDIOVASCULAR: No chest pain, No palpitations  GASTROINTESTINAL: No abd pain, No nausea, No vomiting, No hematemesis, No diarrhea or constipation. No melena, No hematochezia.  GENITOURINARY: No dysuria, No  hematuria  NEUROLOGICAL: No diplopia, No paresthesia, No motor dysfunction  MUSCULOSKELETAL: No arthralgia, No myalgia  SKIN: No rashes, or lesions   PSYCH: no anxiety, no suicidal ideation    All other review of systems is negative unless indicated above    Vital Signs Last 24 Hrs  T(C): 36.6 (09 May 2024 08:28), Max: 37.2 (08 May 2024 22:27)  T(F): 97.9 (09 May 2024 08:28), Max: 99 (08 May 2024 22:27)  HR: 82 (09 May 2024 14:55) (79 - 102)  BP: 121/81 (09 May 2024 08:28) (110/57 - 121/81)  BP(mean): 94 (09 May 2024 08:28) (94 - 94)  RR: 18 (09 May 2024 08:28) (18 - 18)  SpO2: 99% (09 May 2024 14:55) (96% - 99%)    Parameters below as of 09 May 2024 14:55  Patient On (Oxygen Delivery Method): room air        PHYSICAL EXAM:    GENERAL: NAD  HEENT:  NC/AT, EOMI, PERRLA, No scleral icterus, Moist mucous membranes  NECK: Supple, No JVD  CNS:  Alert & Oriented X3, Motor Strength 5/5 B/L upper and lower extremities; DTRs 2+ intact   LUNG: Normal Breath sounds, Clear to auscultation bilaterally, + rales, No rhonchi, + wheezing  HEART: RRR; No murmurs, No rubs  ABDOMEN: +BS, ST/ND/NT  GENITOURINARY: Voiding, Bladder not distended  EXTREMITIES:  2+ Peripheral Pulses, No clubbing, No cyanosis, No tibial edema  MUSCULOSKELTAL: Joints normal ROM, No TTP, No effusion  SKIN: no rashes  RECTAL: deferred, not indicated  BREAST    a/p:    1. Asthma exacerbation due to Influenza A URI: improving   Solumedrol IV will change to Prednisone in am , Albuterol , ICS  Tamiflu day#5    2. Rapid Afib: not controlled   c/w  telemetry  Trops x 3: negative   Didn;'t respond to BB  Increase Cardizem to 360 mg/day  c/w Xarelto   Cardiology evaluation outpatient   Replenish K    3. Acute on Chronic diastolic dysfunction:  IV Lasix given, symptoms improved   c/w Entresto, Aldactone

## 2024-05-10 NOTE — DISCHARGE NOTE NURSING/CASE MANAGEMENT/SOCIAL WORK - PATIENT PORTAL LINK FT
You can access the FollowMyHealth Patient Portal offered by [facility] by registering at the following website: [email or web address]. By joining [employer]’s FollowMyHealth portal, you will also be able to view your health information using other applications (apps) compatible with our system.

## 2024-05-10 NOTE — DISCHARGE NOTE NURSING/CASE MANAGEMENT/SOCIAL WORK - NSDCPEFALRISK_GEN_ALL_CORE
For information on Fall & Injury Prevention, visit: [email or web address] OR  [email or web address] OR  https://www.cdc.gov/steadi/patient.html

## 2024-07-23 ENCOUNTER — NON-APPOINTMENT (OUTPATIENT)
Age: 59
End: 2024-07-23

## 2024-07-23 ENCOUNTER — APPOINTMENT (OUTPATIENT)
Dept: ELECTROPHYSIOLOGY | Facility: CLINIC | Age: 59
End: 2024-07-23
Payer: COMMERCIAL

## 2024-07-23 VITALS
SYSTOLIC BLOOD PRESSURE: 124 MMHG | BODY MASS INDEX: 37.93 KG/M2 | WEIGHT: 235 LBS | DIASTOLIC BLOOD PRESSURE: 71 MMHG | OXYGEN SATURATION: 98 % | HEART RATE: 72 BPM

## 2024-07-23 DIAGNOSIS — I48.0 PAROXYSMAL ATRIAL FIBRILLATION: ICD-10-CM

## 2024-07-23 PROCEDURE — 93000 ELECTROCARDIOGRAM COMPLETE: CPT

## 2024-07-23 PROCEDURE — 99215 OFFICE O/P EST HI 40 MIN: CPT | Mod: 25

## 2024-07-23 RX ORDER — APIXABAN 5 MG/1
5 TABLET, FILM COATED ORAL
Qty: 90 | Refills: 2 | Status: ACTIVE | COMMUNITY
Start: 2024-07-23

## 2024-07-23 RX ORDER — DILTIAZEM HYDROCHLORIDE 360 MG/1
360 TABLET, EXTENDED RELEASE ORAL
Refills: 0 | Status: DISCONTINUED | COMMUNITY
Start: 2024-07-23 | End: 2024-07-23

## 2024-07-23 RX ORDER — SACUBITRIL AND VALSARTAN 24; 26 MG/1; MG/1
24-26 TABLET, FILM COATED ORAL TWICE DAILY
Qty: 60 | Refills: 6 | Status: ACTIVE | COMMUNITY
Start: 2024-07-23

## 2024-07-23 RX ORDER — SPIRONOLACTONE 25 MG/1
25 TABLET ORAL DAILY
Qty: 30 | Refills: 6 | Status: ACTIVE | COMMUNITY
Start: 2024-07-23

## 2024-07-23 RX ORDER — AMIODARONE HYDROCHLORIDE 200 MG/1
200 TABLET ORAL
Qty: 60 | Refills: 0 | Status: ACTIVE | COMMUNITY
Start: 2024-07-23 | End: 1900-01-01

## 2024-07-23 RX ORDER — BETHANECHOL CHLORIDE 50 MG/1
50 TABLET ORAL
Refills: 0 | Status: ACTIVE | COMMUNITY
Start: 2024-07-23

## 2024-07-23 RX ORDER — ATORVASTATIN CALCIUM 40 MG/1
40 TABLET, FILM COATED ORAL DAILY
Refills: 0 | Status: ACTIVE | COMMUNITY
Start: 2024-07-23

## 2024-07-23 RX ORDER — METFORMIN HYDROCHLORIDE 500 MG/1
500 TABLET, COATED ORAL DAILY
Refills: 0 | Status: ACTIVE | COMMUNITY
Start: 2024-07-23

## 2024-07-24 NOTE — CARDIOLOGY SUMMARY
[de-identified] : today: AF [de-identified] : NST 10/4/22: EF 57%, small fixed perfusion defect in the apical segment of the inferior wall of the LV.  No scan evidence of reversible perfusion defects.  No regional wall motion abnormalities [de-identified] : 9/2022: Severely dilated LA, mild pHTN, moderate MR, EF 50-55%, mildly dilated RV, moderate-severely dilated RA

## 2024-07-24 NOTE — REASON FOR VISIT
[Arrhythmia/ECG Abnorrmalities] : arrhythmia/ECG abnormalities [[employer] ] : provided by [employer]   [FreeTextEntry3] : Dr. Samuel [Interpreters_IDNumber] : [id number] [Interpreters_FullName] : Bret [TWNoteComboBox1] : Burmese

## 2024-07-24 NOTE — DISCUSSION/SUMMARY
[FreeTextEntry1] : Mr. Snyder is a 58-year-old gentleman with a history of hypertension, hyperlipidemia, diabetes, and persistent atrial fibrillation since September 2022. We discussed the pathophysiology of atrial fibrillation including management strategies. We discussed rhythm control versus rate control strategies. We discussed options of medication management versus ablation. The options of a cardioversion along with an antiarrhythmic medication for maintenance of sinus rhythm as an initial step to see how he feels in sinus rhythm and a catheter ablation were discussed. Risks and benefits were discussed with each option. The procedures, outcomes and risks of ablation were reviewed. Risks discussed included but were not limited to bleeding, vascular damage, cardiac perforation, tamponade, phrenic nerve injury, stroke, pulmonary vein stenosis and atrial esophageal fistula.  Previously planned for ablation, he favors cardioversion despite the high likelihood for recurrence.  We will load on amio in anticipation for this.  The load will be slow and with low dose given the rate control on no AV laura blockers. After all questions were answered,

## 2024-07-24 NOTE — HISTORY OF PRESENT ILLNESS
[FreeTextEntry1] : He decided NOT to proceed with ablation.  He prefers cardioversion which he also did not follow through with.  He has no chest pain. No shortness of breath. NO palpitations.  He has not missed any doses of his Eliquis.  No bleeding or TE complication

## 2024-08-08 ENCOUNTER — NON-APPOINTMENT (OUTPATIENT)
Age: 59
End: 2024-08-08

## 2024-08-09 ENCOUNTER — TRANSCRIPTION ENCOUNTER (OUTPATIENT)
Age: 59
End: 2024-08-09

## 2024-08-09 ENCOUNTER — OUTPATIENT (OUTPATIENT)
Dept: OUTPATIENT SERVICES | Facility: HOSPITAL | Age: 59
LOS: 1 days | End: 2024-08-09
Payer: COMMERCIAL

## 2024-08-09 VITALS
OXYGEN SATURATION: 96 % | HEART RATE: 73 BPM | RESPIRATION RATE: 18 BRPM | SYSTOLIC BLOOD PRESSURE: 123 MMHG | DIASTOLIC BLOOD PRESSURE: 70 MMHG

## 2024-08-09 VITALS — WEIGHT: 233.91 LBS | HEART RATE: 61 BPM | HEIGHT: 65 IN

## 2024-08-09 DIAGNOSIS — Z98.890 OTHER SPECIFIED POSTPROCEDURAL STATES: Chronic | ICD-10-CM

## 2024-08-09 DIAGNOSIS — I48.91 UNSPECIFIED ATRIAL FIBRILLATION: ICD-10-CM

## 2024-08-09 PROBLEM — I48.19 PERSISTENT ATRIAL FIBRILLATION: Status: ACTIVE | Noted: 2024-08-09

## 2024-08-09 LAB
ANION GAP SERPL CALC-SCNC: 12 MMOL/L — SIGNIFICANT CHANGE UP (ref 5–17)
BUN SERPL-MCNC: 11 MG/DL — SIGNIFICANT CHANGE UP (ref 7–23)
CALCIUM SERPL-MCNC: 9.9 MG/DL — SIGNIFICANT CHANGE UP (ref 8.4–10.5)
CHLORIDE SERPL-SCNC: 103 MMOL/L — SIGNIFICANT CHANGE UP (ref 96–108)
CO2 SERPL-SCNC: 23 MMOL/L — SIGNIFICANT CHANGE UP (ref 22–31)
CREAT SERPL-MCNC: 1.24 MG/DL — SIGNIFICANT CHANGE UP (ref 0.5–1.3)
EGFR: 67 ML/MIN/1.73M2 — SIGNIFICANT CHANGE UP
GLUCOSE BLDC GLUCOMTR-MCNC: 106 MG/DL — HIGH (ref 70–99)
GLUCOSE SERPL-MCNC: 108 MG/DL — HIGH (ref 70–99)
HCT VFR BLD CALC: 47.7 % — SIGNIFICANT CHANGE UP (ref 39–50)
HGB BLD-MCNC: 15.7 G/DL — SIGNIFICANT CHANGE UP (ref 13–17)
MCHC RBC-ENTMCNC: 28.4 PG — SIGNIFICANT CHANGE UP (ref 27–34)
MCHC RBC-ENTMCNC: 32.9 GM/DL — SIGNIFICANT CHANGE UP (ref 32–36)
MCV RBC AUTO: 86.4 FL — SIGNIFICANT CHANGE UP (ref 80–100)
NRBC # BLD: 0 /100 WBCS — SIGNIFICANT CHANGE UP (ref 0–0)
PLATELET # BLD AUTO: 263 K/UL — SIGNIFICANT CHANGE UP (ref 150–400)
POTASSIUM SERPL-MCNC: 4.4 MMOL/L — SIGNIFICANT CHANGE UP (ref 3.5–5.3)
POTASSIUM SERPL-SCNC: 4.4 MMOL/L — SIGNIFICANT CHANGE UP (ref 3.5–5.3)
RBC # BLD: 5.52 M/UL — SIGNIFICANT CHANGE UP (ref 4.2–5.8)
RBC # FLD: 13.2 % — SIGNIFICANT CHANGE UP (ref 10.3–14.5)
SODIUM SERPL-SCNC: 138 MMOL/L — SIGNIFICANT CHANGE UP (ref 135–145)
WBC # BLD: 10.51 K/UL — HIGH (ref 3.8–10.5)
WBC # FLD AUTO: 10.51 K/UL — HIGH (ref 3.8–10.5)

## 2024-08-09 PROCEDURE — 93010 ELECTROCARDIOGRAM REPORT: CPT

## 2024-08-09 PROCEDURE — T1013: CPT

## 2024-08-09 PROCEDURE — 92960 CARDIOVERSION ELECTRIC EXT: CPT

## 2024-08-09 PROCEDURE — 93005 ELECTROCARDIOGRAM TRACING: CPT

## 2024-08-09 PROCEDURE — 85027 COMPLETE CBC AUTOMATED: CPT

## 2024-08-09 PROCEDURE — 93010 ELECTROCARDIOGRAM REPORT: CPT | Mod: 77

## 2024-08-09 PROCEDURE — 80048 BASIC METABOLIC PNL TOTAL CA: CPT

## 2024-08-09 PROCEDURE — 82962 GLUCOSE BLOOD TEST: CPT

## 2024-08-09 RX ORDER — LORATADINE 10 MG
1 TABLET ORAL
Refills: 0 | DISCHARGE

## 2024-08-09 RX ORDER — AMIODARONE HYDROCHLORIDE 50 MG/ML
1 INJECTION, SOLUTION INTRAVENOUS
Refills: 0 | DISCHARGE

## 2024-08-09 RX ORDER — DILTIAZEM HCL 90 MG
1 TABLET ORAL
Refills: 0 | DISCHARGE

## 2024-08-09 NOTE — ASU DISCHARGE PLAN (ADULT/PEDIATRIC) - CARE PROVIDER_API CALL
Dale Resendiz  [facility]  [address]  [place] [address]  Phone: [phone]  Fax: [phone]  Scheduled Appointment: 08/27/2024 09:15 AM

## 2024-08-09 NOTE — ASU DISCHARGE PLAN (ADULT/PEDIATRIC) - C. MAKE IMPORTANT PERSONAL OR BUSINESS DECISIONS
Has Your Rash Been Biopsied Before?: has not been biopsied previously Have You Had Previous Patch Testing In The Past?: true test How Severe Is Your Rash At Its Worst?: moderate Who Is Your Referring Doctor?: Marie Peter Statement Selected

## 2024-08-09 NOTE — ASU DISCHARGE PLAN (ADULT/PEDIATRIC) - ASU DC SPECIAL INSTRUCTIONSFT
You had cardioversion procedure which required sedation.   Please Do Not Drive for 24 hours. Wait at least 24 hours before Driving a Car or operating heavy machinery.   Don’t be alarmed if the skin on your chest is irritated or feels like it is sunburned.   You may apply a soothing lotion to relieve this discomfort. These minor symptoms will go away in a few days.  Continue all medications including blood thinner as your doctor prescribed.   Don’t skip doses or take double doses without consulting with your electrophysiology team or cardiologist.   Monitor your heart rate, symptoms of palpitation, dizziness or SOB.   Keep a record of your results. Ask your healthcare provider when you should seek emergency medical attention.   He or she will tell you which pulse rate reading is dangerous.     Keep in mind this procedure may need to be repeated if the abnormal heart rhythm returns. After the procedure, your healthcare provider will tell you if the treatment worked or if you will need further treatments or medication.    Follow-up care  Make a follow-up appointment, or as directed.  Call 911 right away if you have: Chest pain, Shortness of breath, Loss of vision, speech, or strength or coordination in any body part.

## 2024-08-09 NOTE — H&P CARDIOLOGY - HISTORY OF PRESENT ILLNESS
58 year old male with a hx of hypertension, hyperlipidemia, T2 diabetes, and persistent atrial fibrillation since September 2022 now presents for DCCV. Patient seen and evalauted by Dr. Resendiz, loaded on amio (low dose given the rate control on no AV laura blockers). Now presents to Dr. Resendiz for further arrhythmia management.  58 year old male with a hx of HTN, HLD, T2 diabetes, and persistent atrial fibrillation since September 2022 (on Eliquis, last dose this AM)  now presents for DCCV. Patient seen and evaluated by Dr. Resendiz, loaded on amio (low dose given the rate control on no AV laura blockers). Now presents to Dr. Resendiz for further arrhythmia management.

## 2024-08-09 NOTE — ASU DISCHARGE PLAN (ADULT/PEDIATRIC) - NS MD DC FALL RISK RISK
For information on Fall & Injury Prevention, visit: [email or web address] OR  [email or web address] OR  https://www.cdc.gov/steadi/patient.html

## 2024-08-09 NOTE — ASU DISCHARGE PLAN (ADULT/PEDIATRIC) - CALL YOUR DOCTOR IF YOU HAVE ANY OF THE FOLLOWING:
see written instruction/Swelling that gets worse/Pain not relieved by Medications/Fever greater than (need to indicate Fahrenheit or Celsius)

## 2024-08-11 ENCOUNTER — NON-APPOINTMENT (OUTPATIENT)
Age: 59
End: 2024-08-11

## 2024-08-16 ENCOUNTER — RX RENEWAL (OUTPATIENT)
Age: 59
End: 2024-08-16

## 2024-08-26 ENCOUNTER — NON-APPOINTMENT (OUTPATIENT)
Age: 59
End: 2024-08-26

## 2024-08-27 ENCOUNTER — APPOINTMENT (OUTPATIENT)
Dept: ELECTROPHYSIOLOGY | Facility: CLINIC | Age: 59
End: 2024-08-27
Payer: COMMERCIAL

## 2024-08-27 ENCOUNTER — NON-APPOINTMENT (OUTPATIENT)
Age: 59
End: 2024-08-27

## 2024-08-27 VITALS
HEART RATE: 96 BPM | WEIGHT: 236 LBS | BODY MASS INDEX: 37.93 KG/M2 | OXYGEN SATURATION: 96 % | HEIGHT: 66 IN | DIASTOLIC BLOOD PRESSURE: 76 MMHG | SYSTOLIC BLOOD PRESSURE: 124 MMHG

## 2024-08-27 DIAGNOSIS — I48.0 PAROXYSMAL ATRIAL FIBRILLATION: ICD-10-CM

## 2024-08-27 PROBLEM — J45.909 UNSPECIFIED ASTHMA, UNCOMPLICATED: Chronic | Status: ACTIVE | Noted: 2024-08-09

## 2024-08-27 LAB
ALBUMIN SERPL ELPH-MCNC: 4.6 G/DL
ALP BLD-CCNC: 96 U/L
ALT SERPL-CCNC: 38 U/L
ANION GAP SERPL CALC-SCNC: 14 MMOL/L
AST SERPL-CCNC: 29 U/L
BILIRUB SERPL-MCNC: 0.6 MG/DL
BUN SERPL-MCNC: 15 MG/DL
CALCIUM SERPL-MCNC: 9.7 MG/DL
CHLORIDE SERPL-SCNC: 102 MMOL/L
CO2 SERPL-SCNC: 24 MMOL/L
CREAT SERPL-MCNC: 0.94 MG/DL
EGFR: 93 ML/MIN/1.73M2
GLUCOSE SERPL-MCNC: 112 MG/DL
POTASSIUM SERPL-SCNC: 4.2 MMOL/L
PROT SERPL-MCNC: 7.1 G/DL
SODIUM SERPL-SCNC: 140 MMOL/L
TSH SERPL-ACNC: 1.91 UIU/ML

## 2024-08-27 PROCEDURE — 99215 OFFICE O/P EST HI 40 MIN: CPT | Mod: 25

## 2024-08-27 PROCEDURE — 93000 ELECTROCARDIOGRAM COMPLETE: CPT

## 2024-08-27 NOTE — HISTORY OF PRESENT ILLNESS
[FreeTextEntry1] : 58-year-old gentleman with persistent atrial fibrillation in the setting of hypertension, hyperlipidemia, and  diabetes. He is s/p DCCV on 8/9/2024  He feels fine.  He feels like he is walking better, ie has more energy and better exercise tolerance. He does complain of pain over his entire body.  It is not related to activity.

## 2024-08-27 NOTE — CARDIOLOGY SUMMARY
[de-identified] : today: SR [de-identified] : NST 10/4/22: EF 57%, small fixed perfusion defect in the apical segment of the inferior wall of the LV.  No scan evidence of reversible perfusion defects.  No regional wall motion abnormalities [de-identified] : 9/2022: Severely dilated LA, mild pHTN, moderate MR, EF 50-55%, mildly dilated RV, moderate-severely dilated RA

## 2024-08-27 NOTE — DISCUSSION/SUMMARY
[FreeTextEntry1] : 58-year-old gentleman with persistent atrial fibrillation in the setting of hypertension, hyperlipidemia, and  diabetes. He is s/p DCCV on 8/9/2024  He is in sinus rhythm today, on amiodarone.  We will check bloodwork today.   He is feeling better in sinus and thus I favor continue attempts at rhythm control.  We again discussed the role of ablation, which I counseled him is more ideal therapy in a 58 year old man.  He will consider the recommendation and contact me if he would like proceed.

## 2024-08-27 NOTE — CARDIOLOGY SUMMARY
[de-identified] : today: SR [de-identified] : NST 10/4/22: EF 57%, small fixed perfusion defect in the apical segment of the inferior wall of the LV.  No scan evidence of reversible perfusion defects.  No regional wall motion abnormalities [de-identified] : 9/2022: Severely dilated LA, mild pHTN, moderate MR, EF 50-55%, mildly dilated RV, moderate-severely dilated RA

## 2024-09-18 ENCOUNTER — NON-APPOINTMENT (OUTPATIENT)
Age: 59
End: 2024-09-18

## 2024-11-27 ENCOUNTER — APPOINTMENT (OUTPATIENT)
Dept: CV DIAGNOSITCS | Facility: HOSPITAL | Age: 59
End: 2024-11-27

## 2025-03-04 ENCOUNTER — APPOINTMENT (OUTPATIENT)
Dept: CARDIOLOGY | Facility: CLINIC | Age: 60
End: 2025-03-04

## 2025-03-25 ENCOUNTER — APPOINTMENT (OUTPATIENT)
Dept: ELECTROPHYSIOLOGY | Facility: CLINIC | Age: 60
End: 2025-03-25

## 2025-07-25 NOTE — ED ADULT NURSE NOTE - ED STAT RN HANDOFF TIME
Detail Level: Detailed Quality 226: Preventive Care And Screening: Tobacco Use: Screening And Cessation Intervention: Patient screened for tobacco use and is an ex/non-smoker 03:10